# Patient Record
Sex: FEMALE | Race: WHITE | NOT HISPANIC OR LATINO
[De-identification: names, ages, dates, MRNs, and addresses within clinical notes are randomized per-mention and may not be internally consistent; named-entity substitution may affect disease eponyms.]

---

## 2017-02-10 ENCOUNTER — APPOINTMENT (OUTPATIENT)
Dept: NEUROLOGY | Facility: CLINIC | Age: 82
End: 2017-02-10

## 2017-03-03 ENCOUNTER — EMERGENCY (EMERGENCY)
Facility: HOSPITAL | Age: 82
LOS: 1 days | End: 2017-03-03
Admitting: EMERGENCY MEDICINE
Payer: MEDICARE

## 2017-03-03 PROCEDURE — 70450 CT HEAD/BRAIN W/O DYE: CPT

## 2017-03-03 PROCEDURE — 72125 CT NECK SPINE W/O DYE: CPT

## 2017-03-03 PROCEDURE — 70450 CT HEAD/BRAIN W/O DYE: CPT | Mod: 26

## 2017-03-03 PROCEDURE — 72125 CT NECK SPINE W/O DYE: CPT | Mod: 26

## 2017-03-03 PROCEDURE — 99284 EMERGENCY DEPT VISIT MOD MDM: CPT | Mod: 25

## 2017-09-02 ENCOUNTER — EMERGENCY (EMERGENCY)
Facility: HOSPITAL | Age: 82
LOS: 1 days | Discharge: HOME | End: 2017-09-02
Attending: EMERGENCY MEDICINE | Admitting: EMERGENCY MEDICINE
Payer: MEDICARE

## 2017-09-02 VITALS
SYSTOLIC BLOOD PRESSURE: 124 MMHG | RESPIRATION RATE: 18 BRPM | OXYGEN SATURATION: 95 % | HEART RATE: 64 BPM | HEIGHT: 64 IN | TEMPERATURE: 98 F | WEIGHT: 119.93 LBS | DIASTOLIC BLOOD PRESSURE: 72 MMHG

## 2017-09-02 VITALS
HEART RATE: 76 BPM | TEMPERATURE: 98 F | SYSTOLIC BLOOD PRESSURE: 152 MMHG | RESPIRATION RATE: 16 BRPM | OXYGEN SATURATION: 95 % | DIASTOLIC BLOOD PRESSURE: 64 MMHG

## 2017-09-02 PROCEDURE — 70450 CT HEAD/BRAIN W/O DYE: CPT | Mod: 26

## 2017-09-02 PROCEDURE — 70450 CT HEAD/BRAIN W/O DYE: CPT

## 2017-09-02 PROCEDURE — 99284 EMERGENCY DEPT VISIT MOD MDM: CPT

## 2017-09-02 PROCEDURE — 99284 EMERGENCY DEPT VISIT MOD MDM: CPT | Mod: 25

## 2017-09-02 RX ORDER — LORATADINE 10 MG/1
1 TABLET ORAL
Qty: 0 | Refills: 0 | COMMUNITY

## 2017-09-02 NOTE — ED PROVIDER NOTE - MEDICAL DECISION MAKING DETAILS
89 yo female from [facility] sent for eval of head injury after a fall. Pt denies pain or symptoms. Feels well. PE neg. Plan - CT brain, dc back to [place].

## 2017-09-02 NOTE — ED ADULT NURSE NOTE - PSH
History of appendectomy    History of cataract surgery    History of ovarian cystectomy    History of tonsillectomy    Hx of lumpectomy

## 2017-09-02 NOTE — ED PROVIDER NOTE - OBJECTIVE STATEMENT
89 y/o F pt with history of Parkinson's, TIA (2012), hypothyroidism, MVP, HTN presents to the ED from [facility] [facility] c/o scalp pain s/p backing up and falling backwards. Pt is on aspirin. No LOC. Denies dizziness, visual changes. No further complaints at this time.

## 2017-09-02 NOTE — ED PROVIDER NOTE - PMH
Accelerated hypertension    Gastric reflux    H/O TIA (transient ischemic attack) and stroke    History of mitral valve prolapse    Hypothyroid    Hypothyroidism, acquired    Parkinson's disease

## 2017-09-02 NOTE — ED PROVIDER NOTE - CONSTITUTIONAL, MLM
normal... Well appearing, mildly tremulous, well nourished, awake, alert, oriented to person, place, time/situation and in no apparent distress.

## 2017-09-02 NOTE — ED ADULT NURSE REASSESSMENT NOTE - NS ED NURSE REASSESS COMMENT FT1
pt to be d'c/d  pt discharged stable and ambulatory in nad at present d/c instruction reinforced and pt verbalized understanding vital signs as charted d/c instructions also reinforced with ems pt denies pains offers no c/o at present

## 2017-09-02 NOTE — ED ADULT NURSE NOTE - CHPI ED SYMPTOMS NEG
no abrasion/no vomiting/no deformity/no weakness/no loss of consciousness/no bleeding/no fever/no numbness/no tingling/no confusion

## 2017-12-03 ENCOUNTER — EMERGENCY (EMERGENCY)
Facility: HOSPITAL | Age: 82
LOS: 1 days | Discharge: NURSING, REHAB OR LONG-TERM CARE | End: 2017-12-03
Payer: MEDICARE

## 2017-12-03 VITALS
HEART RATE: 79 BPM | DIASTOLIC BLOOD PRESSURE: 63 MMHG | TEMPERATURE: 98 F | RESPIRATION RATE: 16 BRPM | OXYGEN SATURATION: 100 % | SYSTOLIC BLOOD PRESSURE: 138 MMHG | WEIGHT: 110.01 LBS | HEIGHT: 58 IN

## 2017-12-03 VITALS
SYSTOLIC BLOOD PRESSURE: 140 MMHG | OXYGEN SATURATION: 98 % | DIASTOLIC BLOOD PRESSURE: 70 MMHG | TEMPERATURE: 98 F | RESPIRATION RATE: 16 BRPM | HEART RATE: 70 BPM

## 2017-12-03 PROCEDURE — 99283 EMERGENCY DEPT VISIT LOW MDM: CPT

## 2017-12-03 RX ORDER — SENNA PLUS 8.6 MG/1
1 TABLET ORAL
Qty: 0 | Refills: 0 | COMMUNITY

## 2017-12-03 RX ORDER — ZINC OXIDE 200 MG/G
0 OINTMENT TOPICAL
Qty: 0 | Refills: 0 | COMMUNITY

## 2017-12-03 RX ORDER — DOCUSATE SODIUM 100 MG
0 CAPSULE ORAL
Qty: 0 | Refills: 0 | COMMUNITY

## 2017-12-03 RX ORDER — MULTIVIT-MIN/FERROUS GLUCONATE 9 MG/15 ML
1 LIQUID (ML) ORAL
Qty: 0 | Refills: 0 | COMMUNITY

## 2017-12-03 RX ORDER — FLUTICASONE PROPIONATE 50 MCG
1 SPRAY, SUSPENSION NASAL
Qty: 0 | Refills: 0 | COMMUNITY

## 2017-12-03 RX ORDER — POLYETHYLENE GLYCOL 3350 17 G/17G
0 POWDER, FOR SOLUTION ORAL
Qty: 0 | Refills: 0 | COMMUNITY

## 2017-12-03 RX ORDER — ERGOCALCIFEROL 1.25 MG/1
1 CAPSULE ORAL
Qty: 0 | Refills: 0 | COMMUNITY

## 2017-12-03 NOTE — ED PROVIDER NOTE - OBJECTIVE STATEMENT
89 y/o F presents to the ED BIB EMS for pain in rectum for 1 week. States that she has not had a BM in one week. Pt has parkinson's and that contributes to her frequent fecal impactions. Denies fever, N/V or any other complaints.

## 2017-12-03 NOTE — ED PROVIDER NOTE - CONSTITUTIONAL, MLM
normal... elderly, pleasant, Well appearing, well nourished, awake, alert, oriented to person, place, time/situation and in no apparent distress.

## 2017-12-03 NOTE — ED ADULT NURSE NOTE - OBJECTIVE STATEMENT
Patient BIBEMS for rectal pain and constipation. As per patient she has not passed a bowel movement in "a few days". Abdomen is distended, normactive bowel sounds present. No tenderness is noted.

## 2017-12-03 NOTE — ED PROVIDER NOTE - MEDICAL DECISION MAKING DETAILS
Removed 15 lbs of feces manually. pt got significant relief after fecal disimpaction and fleet enema will be used to complete the disimpaction.

## 2018-02-21 ENCOUNTER — EMERGENCY (EMERGENCY)
Facility: HOSPITAL | Age: 83
LOS: 1 days | Discharge: NURSING, REHAB OR LONG-TERM CARE | End: 2018-02-21
Attending: EMERGENCY MEDICINE | Admitting: EMERGENCY MEDICINE
Payer: MEDICARE

## 2018-02-21 VITALS
TEMPERATURE: 99 F | SYSTOLIC BLOOD PRESSURE: 176 MMHG | HEART RATE: 73 BPM | RESPIRATION RATE: 16 BRPM | DIASTOLIC BLOOD PRESSURE: 76 MMHG

## 2018-02-21 VITALS
HEIGHT: 60 IN | DIASTOLIC BLOOD PRESSURE: 89 MMHG | SYSTOLIC BLOOD PRESSURE: 193 MMHG | WEIGHT: 119.93 LBS | RESPIRATION RATE: 18 BRPM | OXYGEN SATURATION: 96 % | HEART RATE: 72 BPM | TEMPERATURE: 98 F

## 2018-02-21 LAB
ALBUMIN SERPL ELPH-MCNC: 2.6 G/DL — LOW (ref 3.3–5)
ALP SERPL-CCNC: 146 U/L — HIGH (ref 30–120)
ALT FLD-CCNC: <10 U/L DA — LOW (ref 10–60)
ANION GAP SERPL CALC-SCNC: 5 MMOL/L — SIGNIFICANT CHANGE UP (ref 5–17)
APPEARANCE UR: ABNORMAL
APTT BLD: 32.8 SEC — SIGNIFICANT CHANGE UP (ref 27.5–37.4)
AST SERPL-CCNC: 40 U/L — SIGNIFICANT CHANGE UP (ref 10–40)
BACTERIA # UR AUTO: ABNORMAL
BASOPHILS # BLD AUTO: 0.1 K/UL — SIGNIFICANT CHANGE UP (ref 0–0.2)
BASOPHILS NFR BLD AUTO: 1.1 % — SIGNIFICANT CHANGE UP (ref 0–2)
BILIRUB SERPL-MCNC: 0.5 MG/DL — SIGNIFICANT CHANGE UP (ref 0.2–1.2)
BILIRUB UR-MCNC: NEGATIVE — SIGNIFICANT CHANGE UP
BUN SERPL-MCNC: 20 MG/DL — SIGNIFICANT CHANGE UP (ref 7–23)
CALCIUM SERPL-MCNC: 8.3 MG/DL — LOW (ref 8.4–10.5)
CHLORIDE SERPL-SCNC: 104 MMOL/L — SIGNIFICANT CHANGE UP (ref 96–108)
CO2 SERPL-SCNC: 30 MMOL/L — SIGNIFICANT CHANGE UP (ref 22–31)
COLOR SPEC: YELLOW — SIGNIFICANT CHANGE UP
CREAT SERPL-MCNC: 0.75 MG/DL — SIGNIFICANT CHANGE UP (ref 0.5–1.3)
DIFF PNL FLD: ABNORMAL
EOSINOPHIL # BLD AUTO: 0.4 K/UL — SIGNIFICANT CHANGE UP (ref 0–0.5)
EOSINOPHIL NFR BLD AUTO: 4.9 % — SIGNIFICANT CHANGE UP (ref 0–6)
EPI CELLS # UR: SIGNIFICANT CHANGE UP
GLUCOSE SERPL-MCNC: 108 MG/DL — HIGH (ref 70–99)
GLUCOSE UR QL: NEGATIVE MG/DL — SIGNIFICANT CHANGE UP
HCT VFR BLD CALC: 36 % — SIGNIFICANT CHANGE UP (ref 34.5–45)
HGB BLD-MCNC: 11.5 G/DL — SIGNIFICANT CHANGE UP (ref 11.5–15.5)
INR BLD: 0.97 RATIO — SIGNIFICANT CHANGE UP (ref 0.88–1.16)
KETONES UR-MCNC: NEGATIVE — SIGNIFICANT CHANGE UP
LEUKOCYTE ESTERASE UR-ACNC: ABNORMAL
LYMPHOCYTES # BLD AUTO: 2 K/UL — SIGNIFICANT CHANGE UP (ref 1–3.3)
LYMPHOCYTES # BLD AUTO: 28 % — SIGNIFICANT CHANGE UP (ref 13–44)
MAGNESIUM SERPL-MCNC: 2 MG/DL — SIGNIFICANT CHANGE UP (ref 1.6–2.6)
MCHC RBC-ENTMCNC: 30.4 PG — SIGNIFICANT CHANGE UP (ref 27–34)
MCHC RBC-ENTMCNC: 32 GM/DL — SIGNIFICANT CHANGE UP (ref 32–36)
MCV RBC AUTO: 95.2 FL — SIGNIFICANT CHANGE UP (ref 80–100)
MONOCYTES # BLD AUTO: 0.6 K/UL — SIGNIFICANT CHANGE UP (ref 0–0.9)
MONOCYTES NFR BLD AUTO: 8.7 % — SIGNIFICANT CHANGE UP (ref 2–14)
NEUTROPHILS # BLD AUTO: 4.2 K/UL — SIGNIFICANT CHANGE UP (ref 1.8–7.4)
NEUTROPHILS NFR BLD AUTO: 57.4 % — SIGNIFICANT CHANGE UP (ref 43–77)
NITRITE UR-MCNC: NEGATIVE — SIGNIFICANT CHANGE UP
PH UR: 8 — SIGNIFICANT CHANGE UP (ref 5–8)
PLATELET # BLD AUTO: 150 K/UL — SIGNIFICANT CHANGE UP (ref 150–400)
POTASSIUM SERPL-MCNC: 4.5 MMOL/L — SIGNIFICANT CHANGE UP (ref 3.5–5.3)
POTASSIUM SERPL-SCNC: 4.5 MMOL/L — SIGNIFICANT CHANGE UP (ref 3.5–5.3)
PROT SERPL-MCNC: 7.4 G/DL — SIGNIFICANT CHANGE UP (ref 6–8.3)
PROT UR-MCNC: 30 MG/DL
PROTHROM AB SERPL-ACNC: 10.6 SEC — SIGNIFICANT CHANGE UP (ref 9.8–12.7)
RBC # BLD: 3.78 M/UL — LOW (ref 3.8–5.2)
RBC # FLD: 15.1 % — HIGH (ref 10.3–14.5)
RBC CASTS # UR COMP ASSIST: ABNORMAL /HPF (ref 0–4)
SODIUM SERPL-SCNC: 139 MMOL/L — SIGNIFICANT CHANGE UP (ref 135–145)
SP GR SPEC: 1.01 — SIGNIFICANT CHANGE UP (ref 1.01–1.02)
TROPONIN I SERPL-MCNC: 0.01 NG/ML — LOW (ref 0.02–0.06)
UROBILINOGEN FLD QL: NEGATIVE MG/DL — SIGNIFICANT CHANGE UP
WBC # BLD: 7.3 K/UL — SIGNIFICANT CHANGE UP (ref 3.8–10.5)
WBC # FLD AUTO: 7.3 K/UL — SIGNIFICANT CHANGE UP (ref 3.8–10.5)
WBC UR QL: ABNORMAL

## 2018-02-21 PROCEDURE — 93010 ELECTROCARDIOGRAM REPORT: CPT

## 2018-02-21 PROCEDURE — 71045 X-RAY EXAM CHEST 1 VIEW: CPT

## 2018-02-21 PROCEDURE — 72125 CT NECK SPINE W/O DYE: CPT

## 2018-02-21 PROCEDURE — 36415 COLL VENOUS BLD VENIPUNCTURE: CPT

## 2018-02-21 PROCEDURE — 70450 CT HEAD/BRAIN W/O DYE: CPT | Mod: 26

## 2018-02-21 PROCEDURE — 73502 X-RAY EXAM HIP UNI 2-3 VIEWS: CPT | Mod: 26,LT

## 2018-02-21 PROCEDURE — 85610 PROTHROMBIN TIME: CPT

## 2018-02-21 PROCEDURE — 85027 COMPLETE CBC AUTOMATED: CPT

## 2018-02-21 PROCEDURE — 81001 URINALYSIS AUTO W/SCOPE: CPT

## 2018-02-21 PROCEDURE — 70450 CT HEAD/BRAIN W/O DYE: CPT

## 2018-02-21 PROCEDURE — 72170 X-RAY EXAM OF PELVIS: CPT

## 2018-02-21 PROCEDURE — 87186 SC STD MICRODIL/AGAR DIL: CPT

## 2018-02-21 PROCEDURE — 73700 CT LOWER EXTREMITY W/O DYE: CPT

## 2018-02-21 PROCEDURE — 99285 EMERGENCY DEPT VISIT HI MDM: CPT

## 2018-02-21 PROCEDURE — 83735 ASSAY OF MAGNESIUM: CPT

## 2018-02-21 PROCEDURE — 71045 X-RAY EXAM CHEST 1 VIEW: CPT | Mod: 26

## 2018-02-21 PROCEDURE — 96374 THER/PROPH/DIAG INJ IV PUSH: CPT

## 2018-02-21 PROCEDURE — 99284 EMERGENCY DEPT VISIT MOD MDM: CPT | Mod: 25

## 2018-02-21 PROCEDURE — 73552 X-RAY EXAM OF FEMUR 2/>: CPT

## 2018-02-21 PROCEDURE — 73700 CT LOWER EXTREMITY W/O DYE: CPT | Mod: 26,LT

## 2018-02-21 PROCEDURE — 93005 ELECTROCARDIOGRAM TRACING: CPT

## 2018-02-21 PROCEDURE — 85730 THROMBOPLASTIN TIME PARTIAL: CPT

## 2018-02-21 PROCEDURE — 73030 X-RAY EXAM OF SHOULDER: CPT | Mod: 26,LT

## 2018-02-21 PROCEDURE — 96361 HYDRATE IV INFUSION ADD-ON: CPT

## 2018-02-21 PROCEDURE — 84484 ASSAY OF TROPONIN QUANT: CPT

## 2018-02-21 PROCEDURE — 80053 COMPREHEN METABOLIC PANEL: CPT

## 2018-02-21 PROCEDURE — 87086 URINE CULTURE/COLONY COUNT: CPT

## 2018-02-21 PROCEDURE — 73502 X-RAY EXAM HIP UNI 2-3 VIEWS: CPT

## 2018-02-21 PROCEDURE — 73030 X-RAY EXAM OF SHOULDER: CPT

## 2018-02-21 PROCEDURE — 73552 X-RAY EXAM OF FEMUR 2/>: CPT | Mod: 26,LT

## 2018-02-21 PROCEDURE — 72125 CT NECK SPINE W/O DYE: CPT | Mod: 26

## 2018-02-21 RX ORDER — SODIUM CHLORIDE 9 MG/ML
1000 INJECTION INTRAMUSCULAR; INTRAVENOUS; SUBCUTANEOUS ONCE
Qty: 0 | Refills: 0 | Status: COMPLETED | OUTPATIENT
Start: 2018-02-21 | End: 2018-02-21

## 2018-02-21 RX ORDER — CEFOXITIN 1 G/1
1 INJECTION, POWDER, FOR SOLUTION INTRAVENOUS
Qty: 14 | Refills: 0 | OUTPATIENT
Start: 2018-02-21 | End: 2018-02-27

## 2018-02-21 RX ORDER — CEFTRIAXONE 500 MG/1
1 INJECTION, POWDER, FOR SOLUTION INTRAMUSCULAR; INTRAVENOUS ONCE
Qty: 0 | Refills: 0 | Status: COMPLETED | OUTPATIENT
Start: 2018-02-21 | End: 2018-02-21

## 2018-02-21 RX ADMIN — SODIUM CHLORIDE 500 MILLILITER(S): 9 INJECTION INTRAMUSCULAR; INTRAVENOUS; SUBCUTANEOUS at 09:54

## 2018-02-21 RX ADMIN — CEFTRIAXONE 100 GRAM(S): 500 INJECTION, POWDER, FOR SOLUTION INTRAMUSCULAR; INTRAVENOUS at 10:16

## 2018-02-21 NOTE — ED PROVIDER NOTE - CARE PLAN
Principal Discharge DX:	Closed head injury, initial encounter Principal Discharge DX:	Closed head injury, initial encounter  Secondary Diagnosis:	Cystitis

## 2018-02-21 NOTE — ED PROVIDER NOTE - UNABLE TO OBTAIN
Dementia dementia, no ROS on records, will call AL for additional details but staff not available currently.

## 2018-02-21 NOTE — ED PROVIDER NOTE - PROGRESS NOTE DETAILS
Dr. Durham Note: called [facility], spoke to day nurse Apple, unaware of last night details, will try to contact overnight supervisor.  PT stable, will screen for uti, anemia, ICH/ traumatic injury. Dr. Durham Note:  Apple from [facility] [facility] called back, fell while trying to get out bed, occurred at 6:15am, sent for eval.  Reviewed xrays, concerned for possible fx of hip, will get dedicated films. Dr. Durham Note: ct hip negative, ambulated patient...besides coordination issues, pt able to fully bear weight on either leg without pain.  Will dc on antibiotics and f/u outpt.  Notified pt's PCP, Dr. Snow, and pt's son, Michael.

## 2018-02-21 NOTE — ED ADULT NURSE NOTE - OBJECTIVE STATEMENT
Presents to Ed via amb from [address] [facility]. Transfer papers state pt fell this morning. Presents to Ed via amb from [address]. Transfer papers state pt fell this morning. O/E pt is lethargic with confusion. Minimal verbal response. Responses to painful & tactile stimulin with moaning. Pt very stiff. Ecchymosis noted to left forehead & left posterior hip. Old ecchymosis noted to rt lower leg & rt upper arm. Sacral area reddened- stage 1.  Lungs- diminished at bases. Abd soft nontender.

## 2018-02-21 NOTE — ED PROVIDER NOTE - PHYSICAL EXAMINATION
small contusion posterior L shoulder, FROM, nontender.    No c/t/l spine td.  Old contusions lower legs.  pale conj.

## 2018-02-21 NOTE — ED PROVIDER NOTE - OBJECTIVE STATEMENT
Dr. Durham Note: 89yo female from [facility] brought in by EMS for reported fall, hit head.  Onset unclear, details of fall unknown.  Hx limited due to dementia.  Pt has no complaints.

## 2018-02-21 NOTE — ED ADULT NURSE NOTE - NURSING MUSC EXTREMITY LIMITED ROM
left upper extremity/left lower extremity/right upper extremity/stiffness/ Parkinsons/right lower extremity

## 2018-03-15 ENCOUNTER — INPATIENT (INPATIENT)
Facility: HOSPITAL | Age: 83
LOS: 7 days | Discharge: NURSING, REHAB OR LONG-TERM CARE | DRG: 291 | End: 2018-03-23
Attending: INTERNAL MEDICINE | Admitting: INTERNAL MEDICINE
Payer: MEDICARE

## 2018-03-15 VITALS
HEIGHT: 64 IN | OXYGEN SATURATION: 93 % | HEART RATE: 76 BPM | SYSTOLIC BLOOD PRESSURE: 96 MMHG | DIASTOLIC BLOOD PRESSURE: 55 MMHG | RESPIRATION RATE: 20 BRPM | WEIGHT: 119.93 LBS | TEMPERATURE: 99 F

## 2018-03-15 DIAGNOSIS — E03.9 HYPOTHYROIDISM, UNSPECIFIED: ICD-10-CM

## 2018-03-15 DIAGNOSIS — J90 PLEURAL EFFUSION, NOT ELSEWHERE CLASSIFIED: ICD-10-CM

## 2018-03-15 DIAGNOSIS — G20 PARKINSON'S DISEASE: ICD-10-CM

## 2018-03-15 DIAGNOSIS — I10 ESSENTIAL (PRIMARY) HYPERTENSION: ICD-10-CM

## 2018-03-15 DIAGNOSIS — R06.03 ACUTE RESPIRATORY DISTRESS: ICD-10-CM

## 2018-03-15 DIAGNOSIS — J18.9 PNEUMONIA, UNSPECIFIED ORGANISM: ICD-10-CM

## 2018-03-15 LAB
ALBUMIN SERPL ELPH-MCNC: 2.7 G/DL — LOW (ref 3.3–5)
ALP SERPL-CCNC: 209 U/L — HIGH (ref 30–120)
ALT FLD-CCNC: 25 U/L DA — SIGNIFICANT CHANGE UP (ref 10–60)
ANION GAP SERPL CALC-SCNC: 6 MMOL/L — SIGNIFICANT CHANGE UP (ref 5–17)
APPEARANCE UR: ABNORMAL
APTT BLD: 34.3 SEC — SIGNIFICANT CHANGE UP (ref 27.5–37.4)
AST SERPL-CCNC: 23 U/L — SIGNIFICANT CHANGE UP (ref 10–40)
BACTERIA # UR AUTO: ABNORMAL
BASOPHILS # BLD AUTO: 0 K/UL — SIGNIFICANT CHANGE UP (ref 0–0.2)
BASOPHILS NFR BLD AUTO: 0.5 % — SIGNIFICANT CHANGE UP (ref 0–2)
BILIRUB SERPL-MCNC: 0.4 MG/DL — SIGNIFICANT CHANGE UP (ref 0.2–1.2)
BILIRUB UR-MCNC: NEGATIVE — SIGNIFICANT CHANGE UP
BUN SERPL-MCNC: 26 MG/DL — HIGH (ref 7–23)
CALCIUM SERPL-MCNC: 8.1 MG/DL — LOW (ref 8.4–10.5)
CHLORIDE SERPL-SCNC: 101 MMOL/L — SIGNIFICANT CHANGE UP (ref 96–108)
CK MB BLD-MCNC: 2.1 % — SIGNIFICANT CHANGE UP (ref 0–3.5)
CK MB CFR SERPL CALC: 0.7 NG/ML — SIGNIFICANT CHANGE UP (ref 0–3.6)
CK SERPL-CCNC: 33 U/L — SIGNIFICANT CHANGE UP (ref 26–192)
CO2 SERPL-SCNC: 29 MMOL/L — SIGNIFICANT CHANGE UP (ref 22–31)
COLOR SPEC: YELLOW — SIGNIFICANT CHANGE UP
CREAT SERPL-MCNC: 0.85 MG/DL — SIGNIFICANT CHANGE UP (ref 0.5–1.3)
CRP SERPL-MCNC: 4.2 MG/DL — HIGH (ref 0–0.4)
D DIMER BLD IA.RAPID-MCNC: 9946 NG/ML DDU — HIGH
DIFF PNL FLD: ABNORMAL
EOSINOPHIL # BLD AUTO: 0 K/UL — SIGNIFICANT CHANGE UP (ref 0–0.5)
EOSINOPHIL NFR BLD AUTO: 0.4 % — SIGNIFICANT CHANGE UP (ref 0–6)
EPI CELLS # UR: SIGNIFICANT CHANGE UP
GLUCOSE SERPL-MCNC: 127 MG/DL — HIGH (ref 70–99)
GLUCOSE UR QL: NEGATIVE MG/DL — SIGNIFICANT CHANGE UP
HCT VFR BLD CALC: 31.4 % — LOW (ref 34.5–45)
HGB BLD-MCNC: 10.3 G/DL — LOW (ref 11.5–15.5)
HMPV RNA SPEC QL NAA+PROBE: DETECTED
INR BLD: 1.02 RATIO — SIGNIFICANT CHANGE UP (ref 0.88–1.16)
KETONES UR-MCNC: ABNORMAL
LACTATE SERPL-SCNC: 1 MMOL/L — SIGNIFICANT CHANGE UP (ref 0.7–2)
LEUKOCYTE ESTERASE UR-ACNC: ABNORMAL
LYMPHOCYTES # BLD AUTO: 0.9 K/UL — LOW (ref 1–3.3)
LYMPHOCYTES # BLD AUTO: 15.4 % — SIGNIFICANT CHANGE UP (ref 13–44)
MAGNESIUM SERPL-MCNC: 2.4 MG/DL — SIGNIFICANT CHANGE UP (ref 1.6–2.6)
MCHC RBC-ENTMCNC: 32.7 PG — SIGNIFICANT CHANGE UP (ref 27–34)
MCHC RBC-ENTMCNC: 32.9 GM/DL — SIGNIFICANT CHANGE UP (ref 32–36)
MCV RBC AUTO: 99.3 FL — SIGNIFICANT CHANGE UP (ref 80–100)
MONOCYTES # BLD AUTO: 0.6 K/UL — SIGNIFICANT CHANGE UP (ref 0–0.9)
MONOCYTES NFR BLD AUTO: 9.8 % — SIGNIFICANT CHANGE UP (ref 2–14)
NEUTROPHILS # BLD AUTO: 4.5 K/UL — SIGNIFICANT CHANGE UP (ref 1.8–7.4)
NEUTROPHILS NFR BLD AUTO: 73.8 % — SIGNIFICANT CHANGE UP (ref 43–77)
NITRITE UR-MCNC: NEGATIVE — SIGNIFICANT CHANGE UP
NT-PROBNP SERPL-SCNC: 3927 PG/ML — HIGH (ref 0–450)
PH UR: 7 — SIGNIFICANT CHANGE UP (ref 5–8)
PLATELET # BLD AUTO: 135 K/UL — LOW (ref 150–400)
POTASSIUM SERPL-MCNC: 3.9 MMOL/L — SIGNIFICANT CHANGE UP (ref 3.5–5.3)
POTASSIUM SERPL-SCNC: 3.9 MMOL/L — SIGNIFICANT CHANGE UP (ref 3.5–5.3)
PROT SERPL-MCNC: 7.1 G/DL — SIGNIFICANT CHANGE UP (ref 6–8.3)
PROT UR-MCNC: 30 MG/DL
PROTHROM AB SERPL-ACNC: 11.1 SEC — SIGNIFICANT CHANGE UP (ref 9.8–12.7)
RAPID RVP RESULT: DETECTED
RBC # BLD: 3.16 M/UL — LOW (ref 3.8–5.2)
RBC # FLD: 15.8 % — HIGH (ref 10.3–14.5)
RBC CASTS # UR COMP ASSIST: ABNORMAL /HPF (ref 0–4)
SODIUM SERPL-SCNC: 136 MMOL/L — SIGNIFICANT CHANGE UP (ref 135–145)
SP GR SPEC: 1.01 — SIGNIFICANT CHANGE UP (ref 1.01–1.02)
TROPONIN I SERPL-MCNC: 0.01 NG/ML — LOW (ref 0.02–0.06)
UROBILINOGEN FLD QL: 1 MG/DL
WBC # BLD: 6.1 K/UL — SIGNIFICANT CHANGE UP (ref 3.8–10.5)
WBC # FLD AUTO: 6.1 K/UL — SIGNIFICANT CHANGE UP (ref 3.8–10.5)
WBC UR QL: >50

## 2018-03-15 PROCEDURE — 71045 X-RAY EXAM CHEST 1 VIEW: CPT | Mod: 26

## 2018-03-15 PROCEDURE — 99285 EMERGENCY DEPT VISIT HI MDM: CPT

## 2018-03-15 PROCEDURE — 93306 TTE W/DOPPLER COMPLETE: CPT | Mod: 26

## 2018-03-15 PROCEDURE — 71275 CT ANGIOGRAPHY CHEST: CPT | Mod: 26

## 2018-03-15 PROCEDURE — 70450 CT HEAD/BRAIN W/O DYE: CPT | Mod: 26

## 2018-03-15 PROCEDURE — 93010 ELECTROCARDIOGRAM REPORT: CPT

## 2018-03-15 RX ORDER — POLYETHYLENE GLYCOL 3350 17 G/17G
17 POWDER, FOR SOLUTION ORAL DAILY
Qty: 0 | Refills: 0 | Status: DISCONTINUED | OUTPATIENT
Start: 2018-03-15 | End: 2018-03-23

## 2018-03-15 RX ORDER — MORPHINE SULFATE 50 MG/1
0.5 CAPSULE, EXTENDED RELEASE ORAL ONCE
Qty: 0 | Refills: 0 | Status: DISCONTINUED | OUTPATIENT
Start: 2018-03-15 | End: 2018-03-15

## 2018-03-15 RX ORDER — CARBIDOPA AND LEVODOPA 25; 100 MG/1; MG/1
2 TABLET ORAL
Qty: 0 | Refills: 0 | Status: DISCONTINUED | OUTPATIENT
Start: 2018-03-15 | End: 2018-03-23

## 2018-03-15 RX ORDER — DOCUSATE SODIUM 100 MG
100 CAPSULE ORAL
Qty: 0 | Refills: 0 | Status: DISCONTINUED | OUTPATIENT
Start: 2018-03-15 | End: 2018-03-23

## 2018-03-15 RX ORDER — CARBIDOPA AND LEVODOPA 25; 100 MG/1; MG/1
1 TABLET ORAL
Qty: 0 | Refills: 0 | Status: DISCONTINUED | OUTPATIENT
Start: 2018-03-15 | End: 2018-03-23

## 2018-03-15 RX ORDER — SODIUM CHLORIDE 0.65 %
1 AEROSOL, SPRAY (ML) NASAL
Qty: 0 | Refills: 0 | Status: DISCONTINUED | OUTPATIENT
Start: 2018-03-15 | End: 2018-03-23

## 2018-03-15 RX ORDER — CARBIDOPA AND LEVODOPA 25; 100 MG/1; MG/1
0.5 TABLET ORAL
Qty: 0 | Refills: 0 | Status: DISCONTINUED | OUTPATIENT
Start: 2018-03-15 | End: 2018-03-23

## 2018-03-15 RX ORDER — LEVOTHYROXINE SODIUM 125 MCG
100 TABLET ORAL DAILY
Qty: 0 | Refills: 0 | Status: DISCONTINUED | OUTPATIENT
Start: 2018-03-15 | End: 2018-03-23

## 2018-03-15 RX ORDER — ASPIRIN/CALCIUM CARB/MAGNESIUM 324 MG
81 TABLET ORAL DAILY
Qty: 0 | Refills: 0 | Status: DISCONTINUED | OUTPATIENT
Start: 2018-03-15 | End: 2018-03-23

## 2018-03-15 RX ORDER — ESCITALOPRAM OXALATE 10 MG/1
10 TABLET, FILM COATED ORAL DAILY
Qty: 0 | Refills: 0 | Status: DISCONTINUED | OUTPATIENT
Start: 2018-03-15 | End: 2018-03-23

## 2018-03-15 RX ORDER — FUROSEMIDE 40 MG
40 TABLET ORAL DAILY
Qty: 0 | Refills: 0 | Status: DISCONTINUED | OUTPATIENT
Start: 2018-03-15 | End: 2018-03-18

## 2018-03-15 RX ORDER — SODIUM CHLORIDE 9 MG/ML
1000 INJECTION INTRAMUSCULAR; INTRAVENOUS; SUBCUTANEOUS
Qty: 0 | Refills: 0 | Status: COMPLETED | OUTPATIENT
Start: 2018-03-15 | End: 2018-03-15

## 2018-03-15 RX ORDER — SODIUM CHLORIDE 9 MG/ML
3 INJECTION INTRAMUSCULAR; INTRAVENOUS; SUBCUTANEOUS ONCE
Qty: 0 | Refills: 0 | Status: COMPLETED | OUTPATIENT
Start: 2018-03-15 | End: 2018-03-15

## 2018-03-15 RX ORDER — ENOXAPARIN SODIUM 100 MG/ML
30 INJECTION SUBCUTANEOUS DAILY
Qty: 0 | Refills: 0 | Status: DISCONTINUED | OUTPATIENT
Start: 2018-03-15 | End: 2018-03-23

## 2018-03-15 RX ORDER — ALBUTEROL 90 UG/1
2.5 AEROSOL, METERED ORAL EVERY 6 HOURS
Qty: 0 | Refills: 0 | Status: DISCONTINUED | OUTPATIENT
Start: 2018-03-15 | End: 2018-03-20

## 2018-03-15 RX ORDER — CARBIDOPA AND LEVODOPA 25; 100 MG/1; MG/1
2 TABLET ORAL
Qty: 0 | Refills: 0 | Status: DISCONTINUED | OUTPATIENT
Start: 2018-03-15 | End: 2018-03-15

## 2018-03-15 RX ORDER — BUDESONIDE, MICRONIZED 100 %
0.5 POWDER (GRAM) MISCELLANEOUS
Qty: 0 | Refills: 0 | Status: DISCONTINUED | OUTPATIENT
Start: 2018-03-15 | End: 2018-03-20

## 2018-03-15 RX ORDER — FUROSEMIDE 40 MG
40 TABLET ORAL ONCE
Qty: 0 | Refills: 0 | Status: COMPLETED | OUTPATIENT
Start: 2018-03-15 | End: 2018-03-15

## 2018-03-15 RX ADMIN — Medication 40 MILLIGRAM(S): at 17:09

## 2018-03-15 RX ADMIN — Medication 0.5 MILLIGRAM(S): at 19:17

## 2018-03-15 RX ADMIN — SODIUM CHLORIDE 3 MILLILITER(S): 9 INJECTION INTRAMUSCULAR; INTRAVENOUS; SUBCUTANEOUS at 13:12

## 2018-03-15 RX ADMIN — SODIUM CHLORIDE 1000 MILLILITER(S): 9 INJECTION INTRAMUSCULAR; INTRAVENOUS; SUBCUTANEOUS at 16:21

## 2018-03-15 RX ADMIN — MORPHINE SULFATE 0.5 MILLIGRAM(S): 50 CAPSULE, EXTENDED RELEASE ORAL at 19:20

## 2018-03-15 RX ADMIN — MORPHINE SULFATE 0.5 MILLIGRAM(S): 50 CAPSULE, EXTENDED RELEASE ORAL at 17:09

## 2018-03-15 RX ADMIN — ALBUTEROL 2.5 MILLIGRAM(S): 90 AEROSOL, METERED ORAL at 19:17

## 2018-03-15 RX ADMIN — SODIUM CHLORIDE 1000 MILLILITER(S): 9 INJECTION INTRAMUSCULAR; INTRAVENOUS; SUBCUTANEOUS at 14:43

## 2018-03-15 RX ADMIN — Medication 20 MILLIGRAM(S): at 23:05

## 2018-03-15 NOTE — CONSULT NOTE ADULT - ASSESSMENT
89 year old female, history of Hypertension, Parkinsons, comes in with respiratory distress.  O2 sat at [address] 75% on rrom air but in ER 95% on room air.  CXR does not appear to be much changed.  BNP rater "low".  EKG = no acute changes, Troponin low.  Quick flu tests positive, all suggest pulmonary cause of respiratory distress.  Await Echo

## 2018-03-15 NOTE — ED PROVIDER NOTE - MEDICAL DECISION MAKING DETAILS
89 yo F from [address] [facility] with hypoxia. PE reveals probable pneumonia. Plan - CXR, labs, probable admission.

## 2018-03-15 NOTE — ED ADULT NURSE REASSESSMENT NOTE - NS ED NURSE REASSESS COMMENT FT1
patient's oxygen is lower to 68% and switched to Non rebreather mask, MD Shaikh aware and Lasix 40mg ivp and Morphine .5mg ivp given. will continue to monitor.

## 2018-03-15 NOTE — CONSULT NOTE ADULT - PROBLEM SELECTOR RECOMMENDATION 9
check RVP - isolation precs  o2 support, keep sat > 88 pct  oral and skin care  chest pt and suction prn, asp prec, HOB elev  NEBS and Solumedrol and Nasal Saline and Pulmicort NEBS  LASIX daily, TTE, Cardio eval, I and O  CTA chest done, report pending, pleural eff, noted, will arrange for US guided thoracentesis with IR tomorrow  DVT p  oral hygiene  skin care  nutrition  assist with ADL  pall care eval for MOLST discussion and goals of care discussion  prognosis guarded, advanced age and multiple comorbidities,   will follow
Probably Viral.  Follow Pulmonary

## 2018-03-15 NOTE — H&P ADULT - ATTENDING COMMENTS
90 minutes spent on this visit, 50% visit time spent in care co-ordination with other attendings and counselling patient  I have discussed care plan with patient and HCP,expressed understanding of problems treatment and their effect and side effects, alternatives in detail,I have asked if they have any questions and concerns and appropriately addressed them to best of my ability  Reviewed all diagonostic tests, lab results and drug drug interactions, and medications

## 2018-03-15 NOTE — ED PROVIDER NOTE - OBJECTIVE STATEMENT
89 y/o F w/ PMHx parkinson's disease, TIA and CVA, HTN, mitral valve prolapse, hypothyroidism, gastric reflux presents to the ED BIBA from [address] for evaluation of Low O2 Sat. As per pt's paperwork, pt had a Pulse ox of 75% on room air. In ED pt's has a Pulse ox value of 93% on room air. Pt states she has a cough. Pt denies CP, SOB or any other complaints at this time.    PMD: Dr. Katie Snow

## 2018-03-15 NOTE — H&P ADULT - HISTORY OF PRESENT ILLNESS
89 yr ol resident of [facility] assisted living facility, with PMH of Accelerated hypertension  Gastric reflux H/O TIA (transient ischemic attack) and stroke  History of mitral valve prolapse  Hypothyroid  Parkinson's disease  was having SOB, and was found to be hypoxic in facility and sent to hospital, In Ed pt is sob, oxygenating well, but markedly congested , altered mental state and had elevated D Dimer had CT angio has pleural effusion left more than rt.  . Unremarkable noncontrast CT scan of the brain.     Likely having CHf acute TTE ordered, cardio consult called, had b/l pleural effusion, pulmonary consult called, mild increase in LFT.  [facility] has many pts with hMPV infection , will try to out viral illness,  pt denies any bowel or urinary complaint, no fever

## 2018-03-15 NOTE — ED ADULT NURSE NOTE - OBJECTIVE STATEMENT
patient is from [facility], c/o low oxygen saturation, patient is aaox3, no c/o pain at this time, noted wet cough, skin is warm to touch and intact. IV accessed and tolerating. will continue to monitor.

## 2018-03-15 NOTE — PROGRESS NOTE ADULT - SUBJECTIVE AND OBJECTIVE BOX
Patient is a 89y old  Female who presents with a chief complaint of sob, Hypoxia (15 Mar 2018 15:43)      BRIEF HOSPITAL COURSE:     89F, HTN, GERD, reflux, TIA, dementia, from [address]. Arrived with resp. distress required BiPAP, found to have large effusions, awaiting IR drainage. Also found to be (+) hMPV    Events last 24 hours: ***    PAST MEDICAL & SURGICAL HISTORY:  History of mitral valve prolapse  Gastric reflux  H/O TIA (transient ischemic attack) and stroke  Hypothyroid  Hypothyroidism, acquired  Parkinson's disease  Accelerated hypertension  History of tonsillectomy  History of cataract surgery  Hx of lumpectomy  History of appendectomy  History of ovarian cystectomy      Review of Systems:  N/A, dementia    Medications:    enalapril 5 milliGRAM(s) Oral daily  furosemide   Injectable 40 milliGRAM(s) IV Push daily    ALBUTerol    0.083% 2.5 milliGRAM(s) Nebulizer every 6 hours  buDESOnide   0.5 milliGRAM(s) Respule 0.5 milliGRAM(s) Inhalation two times a day    carbidopa/levodopa  25/100 2 Tablet(s) Oral <User Schedule>  carbidopa/levodopa CR 50/200 1 Tablet(s) Oral <User Schedule>  carbidopa/levodopa CR 50/200 0.5 Tablet(s) Oral <User Schedule>  escitalopram 10 milliGRAM(s) Oral daily      aspirin enteric coated 81 milliGRAM(s) Oral daily  enoxaparin Injectable 30 milliGRAM(s) SubCutaneous daily    docusate sodium 100 milliGRAM(s) Oral two times a day  polyethylene glycol 3350 17 Gram(s) Oral daily PRN      levothyroxine 100 MICROGram(s) Oral daily  methylPREDNISolone sodium succinate Injectable 20 milliGRAM(s) IV Push every 8 hours        sodium chloride 0.65% Nasal 1 Spray(s) Both Nostrils two times a day            ICU Vital Signs Last 24 Hrs  T(C): 36.5 (16 Mar 2018 04:07), Max: 37.4 (15 Mar 2018 12:38)  T(F): 97.7 (16 Mar 2018 04:07), Max: 99.3 (15 Mar 2018 12:38)  HR: 66 (16 Mar 2018 06:00) (63 - 99)  BP: 158/64 (16 Mar 2018 06:00) (96/55 - 168/69)  BP(mean): 91 (16 Mar 2018 06:00) (68 - 91)  ABP: --  ABP(mean): --  RR: 21 (16 Mar 2018 06:00) (15 - 21)  SpO2: 98% (16 Mar 2018 06:00) (93% - 100%)          I&O's Detail        LABS:                        10.3   6.1   )-----------( 135      ( 15 Mar 2018 13:19 )             31.4     15    136  |  101  |  26<H>  ----------------------------<  127<H>  3.9   |  29  |  0.85    Ca    8.1<L>      15 Mar 2018 13:33  Mg     2.4     03-15    TPro  7.1  /  Alb  2.7<L>  /  TBili  0.4  /  DBili  x   /  AST  23  /  ALT  25  /  AlkPhos  209<H>  15      CARDIAC MARKERS ( 15 Mar 2018 13:33 )  .008 ng/mL / x     / 33 U/L / x     / 0.7 ng/mL      CAPILLARY BLOOD GLUCOSE        PT/INR - ( 15 Mar 2018 13:33 )   PT: 11.1 sec;   INR: 1.02 ratio         PTT - ( 15 Mar 2018 13:33 )  PTT:34.3 sec  Urinalysis Basic - ( 15 Mar 2018 15:59 )    Color: Yellow / Appearance: Turbid / S.010 / pH: x  Gluc: x / Ketone: Trace  / Bili: Negative / Urobili: 1 mg/dL   Blood: x / Protein: 30 mg/dL / Nitrite: Negative   Leuk Esterase: Moderate / RBC: 11-25 /HPF / WBC >50   Sq Epi: x / Non Sq Epi: Few / Bacteria: Many      CULTURES:  Rapid RVP Result: Detected (03-15-18 @ 15:01)      Physical Examination:    General: No acute distress.  Alert, oriented, interactive, nonfocal    HEENT: Pupils equal, reactive to light.  Symmetric.    PULM: diminished at bases bilaterally, no significant sputum production    CVS: Regular rate and rhythm, no murmurs, rubs, or gallops    ABD: Soft, nondistended, nontender, normoactive bowel sounds, no masses    EXT: No edema, nontender    SKIN: Warm and well perfused, no rashes noted.

## 2018-03-15 NOTE — H&P ADULT - ASSESSMENT
89 yr ol resident of [facility] assisted living facility, with PMH of Accelerated hypertension  Gastric reflux H/O TIA (transient ischemic attack) and stroke  History of mitral valve prolapse  Hypothyroid  Parkinson's disease  was having SOB, and was found to be hypoxic in facility and sent to hospital, In Ed pt is sob, oxygenating well, but markedly congested , altered mental state and had elevated D Dimer had CT angio has pleural effusion left more than rt.  . Unremarkable noncontrast CT scan of the brain.     Likely having CHf acute TTE ordered, cardio consult called, had b/l pleural effusion, pulmonary consult called, mild increase in LFT.  [facility] has many pts with hMPV infection , will try to out viral illness,  pt denies any bowel or urinary complaint, no fever     and was in ed for head trauma,and was treated with abx for uti recently  admitted with acute respiratory distress with b/l pleural effusion with possible chf with H/o HTN with MVP, PE ruled out

## 2018-03-16 DIAGNOSIS — I35.0 NONRHEUMATIC AORTIC (VALVE) STENOSIS: ICD-10-CM

## 2018-03-16 DIAGNOSIS — R45.1 RESTLESSNESS AND AGITATION: ICD-10-CM

## 2018-03-16 DIAGNOSIS — J12.3 HUMAN METAPNEUMOVIRUS PNEUMONIA: ICD-10-CM

## 2018-03-16 LAB
ALBUMIN SERPL ELPH-MCNC: 2.3 G/DL — LOW (ref 3.3–5)
ALP SERPL-CCNC: 182 U/L — HIGH (ref 30–120)
ALT FLD-CCNC: <10 U/L DA — LOW (ref 10–60)
ANION GAP SERPL CALC-SCNC: 6 MMOL/L — SIGNIFICANT CHANGE UP (ref 5–17)
AST SERPL-CCNC: 20 U/L — SIGNIFICANT CHANGE UP (ref 10–40)
BILIRUB SERPL-MCNC: 0.4 MG/DL — SIGNIFICANT CHANGE UP (ref 0.2–1.2)
BUN SERPL-MCNC: 20 MG/DL — SIGNIFICANT CHANGE UP (ref 7–23)
CALCIUM SERPL-MCNC: 8.1 MG/DL — LOW (ref 8.4–10.5)
CHLORIDE SERPL-SCNC: 104 MMOL/L — SIGNIFICANT CHANGE UP (ref 96–108)
CO2 SERPL-SCNC: 32 MMOL/L — HIGH (ref 22–31)
CREAT SERPL-MCNC: 0.75 MG/DL — SIGNIFICANT CHANGE UP (ref 0.5–1.3)
GLUCOSE SERPL-MCNC: 82 MG/DL — SIGNIFICANT CHANGE UP (ref 70–99)
HCT VFR BLD CALC: 29.6 % — LOW (ref 34.5–45)
HGB BLD-MCNC: 9.5 G/DL — LOW (ref 11.5–15.5)
LDH SERPL L TO P-CCNC: 200 U/L — SIGNIFICANT CHANGE UP (ref 50–242)
MAGNESIUM SERPL-MCNC: 2.2 MG/DL — SIGNIFICANT CHANGE UP (ref 1.6–2.6)
MCHC RBC-ENTMCNC: 31.9 PG — SIGNIFICANT CHANGE UP (ref 27–34)
MCHC RBC-ENTMCNC: 32.1 GM/DL — SIGNIFICANT CHANGE UP (ref 32–36)
MCV RBC AUTO: 99.6 FL — SIGNIFICANT CHANGE UP (ref 80–100)
PLATELET # BLD AUTO: 119 K/UL — LOW (ref 150–400)
POTASSIUM SERPL-MCNC: 3.4 MMOL/L — LOW (ref 3.5–5.3)
POTASSIUM SERPL-SCNC: 3.4 MMOL/L — LOW (ref 3.5–5.3)
PROT SERPL-MCNC: 6.5 G/DL — SIGNIFICANT CHANGE UP (ref 6–8.3)
RBC # BLD: 2.98 M/UL — LOW (ref 3.8–5.2)
RBC # FLD: 15.9 % — HIGH (ref 10.3–14.5)
SODIUM SERPL-SCNC: 142 MMOL/L — SIGNIFICANT CHANGE UP (ref 135–145)
WBC # BLD: 5.4 K/UL — SIGNIFICANT CHANGE UP (ref 3.8–10.5)
WBC # FLD AUTO: 5.4 K/UL — SIGNIFICANT CHANGE UP (ref 3.8–10.5)

## 2018-03-16 PROCEDURE — 76700 US EXAM ABDOM COMPLETE: CPT | Mod: 26

## 2018-03-16 RX ORDER — LACTOBACILLUS ACIDOPHILUS 100MM CELL
1 CAPSULE ORAL
Qty: 0 | Refills: 0 | Status: DISCONTINUED | OUTPATIENT
Start: 2018-03-16 | End: 2018-03-23

## 2018-03-16 RX ORDER — ERTAPENEM SODIUM 1 G/1
500 INJECTION, POWDER, LYOPHILIZED, FOR SOLUTION INTRAMUSCULAR; INTRAVENOUS EVERY 24 HOURS
Qty: 0 | Refills: 0 | Status: COMPLETED | OUTPATIENT
Start: 2018-03-16 | End: 2018-03-22

## 2018-03-16 RX ORDER — HALOPERIDOL DECANOATE 100 MG/ML
1 INJECTION INTRAMUSCULAR ONCE
Qty: 0 | Refills: 0 | Status: COMPLETED | OUTPATIENT
Start: 2018-03-16 | End: 2018-03-16

## 2018-03-16 RX ORDER — POTASSIUM CHLORIDE 20 MEQ
40 PACKET (EA) ORAL ONCE
Qty: 0 | Refills: 0 | Status: COMPLETED | OUTPATIENT
Start: 2018-03-16 | End: 2018-03-16

## 2018-03-16 RX ADMIN — ALBUTEROL 2.5 MILLIGRAM(S): 90 AEROSOL, METERED ORAL at 12:39

## 2018-03-16 RX ADMIN — Medication 0.5 MILLIGRAM(S): at 07:10

## 2018-03-16 RX ADMIN — Medication 1 SPRAY(S): at 17:41

## 2018-03-16 RX ADMIN — Medication 20 MILLIGRAM(S): at 14:03

## 2018-03-16 RX ADMIN — Medication 40 MILLIGRAM(S): at 06:47

## 2018-03-16 RX ADMIN — Medication 81 MILLIGRAM(S): at 11:35

## 2018-03-16 RX ADMIN — Medication 0.5 MILLIGRAM(S): at 20:23

## 2018-03-16 RX ADMIN — ALBUTEROL 2.5 MILLIGRAM(S): 90 AEROSOL, METERED ORAL at 20:23

## 2018-03-16 RX ADMIN — Medication 100 MICROGRAM(S): at 06:48

## 2018-03-16 RX ADMIN — Medication 1 TABLET(S): at 11:35

## 2018-03-16 RX ADMIN — Medication 5 MILLIGRAM(S): at 06:47

## 2018-03-16 RX ADMIN — CARBIDOPA AND LEVODOPA 2 TABLET(S): 25; 100 TABLET ORAL at 17:12

## 2018-03-16 RX ADMIN — HALOPERIDOL DECANOATE 1 MILLIGRAM(S): 100 INJECTION INTRAMUSCULAR at 21:45

## 2018-03-16 RX ADMIN — ESCITALOPRAM OXALATE 10 MILLIGRAM(S): 10 TABLET, FILM COATED ORAL at 11:34

## 2018-03-16 RX ADMIN — ALBUTEROL 2.5 MILLIGRAM(S): 90 AEROSOL, METERED ORAL at 07:10

## 2018-03-16 RX ADMIN — Medication 100 MILLIGRAM(S): at 06:46

## 2018-03-16 RX ADMIN — Medication 40 MILLIEQUIVALENT(S): at 11:35

## 2018-03-16 RX ADMIN — ENOXAPARIN SODIUM 30 MILLIGRAM(S): 100 INJECTION SUBCUTANEOUS at 11:35

## 2018-03-16 RX ADMIN — Medication 1 TABLET(S): at 17:12

## 2018-03-16 RX ADMIN — Medication 20 MILLIGRAM(S): at 06:48

## 2018-03-16 RX ADMIN — Medication 20 MILLIGRAM(S): at 21:52

## 2018-03-16 RX ADMIN — CARBIDOPA AND LEVODOPA 0.5 TABLET(S): 25; 100 TABLET ORAL at 06:51

## 2018-03-16 RX ADMIN — Medication 100 MILLIGRAM(S): at 17:41

## 2018-03-16 RX ADMIN — ALBUTEROL 2.5 MILLIGRAM(S): 90 AEROSOL, METERED ORAL at 01:57

## 2018-03-16 RX ADMIN — Medication 1 SPRAY(S): at 06:48

## 2018-03-16 RX ADMIN — CARBIDOPA AND LEVODOPA 2 TABLET(S): 25; 100 TABLET ORAL at 14:03

## 2018-03-16 RX ADMIN — CARBIDOPA AND LEVODOPA 2 TABLET(S): 25; 100 TABLET ORAL at 09:28

## 2018-03-16 RX ADMIN — ERTAPENEM SODIUM 100 MILLIGRAM(S): 1 INJECTION, POWDER, LYOPHILIZED, FOR SOLUTION INTRAMUSCULAR; INTRAVENOUS at 18:46

## 2018-03-16 NOTE — PROGRESS NOTE ADULT - ASSESSMENT
89 year old female with viral pneumonia secondary to human meta pneumovirus     Time spent: 30 mins assessing presenting problems of acute illness that poses high probability  end organ damage/dysfunction.  Medical decision making inculding plan of daily care, reviewing data, reviewing radiology, discussing with multidisciplinary team, non inclusive of procedures, discussing goals of care with patient/family

## 2018-03-16 NOTE — PROGRESS NOTE ADULT - SUBJECTIVE AND OBJECTIVE BOX
Patient is a 89y old  Female who presents with a chief complaint of sob, Hypoxia (15 Mar 2018 15:43)      INTERVAL HPI/OVERNIGHT EVENTS:    Home Medications:  acetaminophen-diphenhydramine 500 mg-25 mg oral capsule:  (03 Dec 2017 18:30)  aspirin 81 mg oral delayed release capsule:  orally once a day (03 Dec 2017 18:30)  carbidopa-levodopa 25 mg-100 mg oral tablet: 2  orally 4 times a day (03 Dec 2017 18:30)  carbidopa-levodopa 50 mg-200 mg oral tablet, extended release: 1 tab(s) orally every 6 hours (03 Dec 2017 18:30)  Centrum oral tablet, chewable: 1 tab(s) orally once a day (03 Dec 2017 18:30)  Colace 100 mg oral capsule:  orally 3 times a day (03 Dec 2017 18:00)  docusate potassium 100 mg oral capsule:  (03 Dec 2017 18:30)  enalapril 5 mg oral tablet: 1 tab(s) orally once a day (03 Dec 2017 18:30)  ergocalciferol 50,000 intl units (1.25 mg) oral capsule: 1 cap(s) orally every other week (03 Dec 2017 18:30)  Flonase 50 mcg/inh nasal spray: 1 spray(s) nasal once a day (03 Dec 2017 18:30)  levothyroxine 100 mcg (0.1 mg) oral tablet: 1 tab(s) orally once a day (03 Dec 2017 18:30)  Lexapro 10 mg oral tablet: 1 tab(s) orally once a day (03 Dec 2017 18:30)  loratadine 10 mg oral tablet: 1 tab(s) orally once a day (03 Dec 2017 18:30)  Milk of Magnesia:  (03 Dec 2017 18:30)  MiraLax oral powder for reconstitution:  (03 Dec 2017 18:30)  Ocuvite Lutein oral capsule: 1 cap(s) orally 2 times a day (03 Dec 2017 18:00)  omeprazole 20 mg oral delayed release capsule:  orally 2 times a day (03 Dec 2017 18:00)  polyethylene glycol 3350 oral powder for reconstitution:  orally every 3 days (03 Dec 2017 18:00)  senna 8.6 mg oral tablet: 2  orally once a day (at bedtime) (03 Dec 2017 18:30)  Senna 8.6 mg oral tablet: 1 tab(s) orally once a day (at bedtime) (03 Dec 2017 18:30)  Systane ophthalmic solution: 1 drop(s) to each affected eye 2 times a day (03 Dec 2017 18:00)  Systane ophthalmic solution: 1 drop(s) to each affected eye 2 times a day (03 Dec 2017 18:30)  zinc oxide topical ointment:  (03 Dec 2017 18:30)      MEDICATIONS  (STANDING):  ALBUTerol    0.083% 2.5 milliGRAM(s) Nebulizer every 6 hours  aspirin enteric coated 81 milliGRAM(s) Oral daily  buDESOnide   0.5 milliGRAM(s) Respule 0.5 milliGRAM(s) Inhalation two times a day  carbidopa/levodopa  25/100 2 Tablet(s) Oral <User Schedule>  carbidopa/levodopa CR 50/200 1 Tablet(s) Oral <User Schedule>  carbidopa/levodopa CR 50/200 0.5 Tablet(s) Oral <User Schedule>  docusate sodium 100 milliGRAM(s) Oral two times a day  enalapril 5 milliGRAM(s) Oral daily  enoxaparin Injectable 30 milliGRAM(s) SubCutaneous daily  escitalopram 10 milliGRAM(s) Oral daily  furosemide   Injectable 40 milliGRAM(s) IV Push daily  levothyroxine 100 MICROGram(s) Oral daily  methylPREDNISolone sodium succinate Injectable 20 milliGRAM(s) IV Push every 8 hours  sodium chloride 0.65% Nasal 1 Spray(s) Both Nostrils two times a day    MEDICATIONS  (PRN):  polyethylene glycol 3350 17 Gram(s) Oral daily PRN Constipation      Allergies    beta blockers (Hives)  penicillin (Unknown)    Intolerances        REVIEW OF SYSTEMS:  unable as pt lethargic, denies though pain   Vital Signs Last 24 Hrs  T(C): 37.2 (16 Mar 2018 08:13), Max: 37.4 (15 Mar 2018 12:38)  T(F): 98.9 (16 Mar 2018 08:13), Max: 99.3 (15 Mar 2018 12:38)  HR: 66 (16 Mar 2018 08:20) (63 - 99)  BP: 157/67 (16 Mar 2018 08:20) (96/55 - 168/69)  BP(mean): 94 (16 Mar 2018 08:20) (68 - 94)  RR: 13 (16 Mar 2018 08:20) (13 - 21)  SpO2: 99% (16 Mar 2018 08:20) (93% - 100%)    PHYSICAL EXAM:  GENERAL: NAD, well-groomed, well-developed  HEAD:  Atraumatic, Normocephalic  EYES: EOMI, PERRLA, conjunctiva and sclera clear  ENMT: Moist mucous membranes,   NECK: Supple, No JVD, Normal thyroid  NERVOUS SYSTEM:  Alert & Oriented X1, Motor Strength 3/5 B/L upper and lower extremities; DTRs 2+ intact and symmetric  CHEST/LUNG: BS decreased at bases  HEART: Regular rate and rhythm; No murmurs, rubs, or gallops  ABDOMEN: Soft, Nontender, Nondistended; Bowel sounds present  EXTREMITIES:  2+ Peripheral Pulses, No clubbing, cyanosis, or edema  LYMPH: No lymphadenopathy noted  SKIN: No rashes or lesions    LABS:                        9.5    5.4   )-----------( 119      ( 16 Mar 2018 07:18 )             29.6     03-16    142  |  104  |  20  ----------------------------<  82  3.4<L>   |  32<H>  |  0.75    Ca    8.1<L>      16 Mar 2018 07:18  Mg     2.2     -    TPro  6.5  /  Alb  2.3<L>  /  TBili  0.4  /  DBili  x   /  AST  20  /  ALT  <10<L>  /  AlkPhos  182<H>  -16    PT/INR - ( 15 Mar 2018 13:33 )   PT: 11.1 sec;   INR: 1.02 ratio         PTT - ( 15 Mar 2018 13:33 )  PTT:34.3 sec  Urinalysis Basic - ( 15 Mar 2018 15:59 )    Color: Yellow / Appearance: Turbid / S.010 / pH: x  Gluc: x / Ketone: Trace  / Bili: Negative / Urobili: 1 mg/dL   Blood: x / Protein: 30 mg/dL / Nitrite: Negative   Leuk Esterase: Moderate / RBC: 11-25 /HPF / WBC >50   Sq Epi: x / Non Sq Epi: Few / Bacteria: Many      CAPILLARY BLOOD GLUCOSE  < from: US Transthoracic Echocardiogram w/Doppler Complete (03.15.18 @ 19:20) >    1. Grossly normal left ventricular size and overall systolic function.   Moderate concentric left ventricular hypertrophy. Mild diastolic   dysfunction (stage I).  2. Mild left atrial enlargement.  3. Severe aortic stenosis.   4. Moderate pulmonary hypertension.    < end of copied text >    < from: CT Angio Chest w/ IV Cont (03.15.18 @ 15:22) >      There are bilateral pleural effusions, slightly larger on the left   occupying roughly 30% hemithorax. There is no pulmonary embolism. There   is no pericardial effusion. There is partial consolidation of the left   lower lobe without air bronchograms.  There is mild interstitial opacity   in the nondependent portions of both lower lobes and to a lesser degree   the upper lobes.The aorta is normal in caliber and enhances uniformly.   There is extensive thoracic ankylosis with kyphoscoliosis. Incidental   note is made of multiple small calcified stones in the otherwise normal   gallbladder.    IMPRESSION:    Negative for pulmonary embolism. Bilateral pleural effusions with   associated basilar atelectasis and interstitial pneumonia or pneumonitis      < end of copied text >            I&O's Summary      RADIOLOGY & ADDITIONAL TESTS:    Imaging Personally Reviewed:  [ ] YES  [ ] NO    Consultant(s) Notes Reviewed:  [ ] YES  [ ] NO    Care Discussed with Consultants/Other Providers [ ] YES  [ ] NO

## 2018-03-16 NOTE — PROGRESS NOTE ADULT - SUBJECTIVE AND OBJECTIVE BOX
Neurology follow up note    JESUS [id number]      Interval History:    Patient feels ok no new complaints.    MEDICATIONS    ALBUTerol    0.083% 2.5 milliGRAM(s) Nebulizer every 6 hours  aspirin enteric coated 81 milliGRAM(s) Oral daily  buDESOnide   0.5 milliGRAM(s) Respule 0.5 milliGRAM(s) Inhalation two times a day  carbidopa/levodopa  25/100 2 Tablet(s) Oral <User Schedule>  carbidopa/levodopa CR 50/200 1 Tablet(s) Oral <User Schedule>  carbidopa/levodopa CR 50/200 0.5 Tablet(s) Oral <User Schedule>  docusate sodium 100 milliGRAM(s) Oral two times a day  enalapril 5 milliGRAM(s) Oral daily  enoxaparin Injectable 30 milliGRAM(s) SubCutaneous daily  escitalopram 10 milliGRAM(s) Oral daily  furosemide   Injectable 40 milliGRAM(s) IV Push daily  levothyroxine 100 MICROGram(s) Oral daily  methylPREDNISolone sodium succinate Injectable 20 milliGRAM(s) IV Push every 8 hours  polyethylene glycol 3350 17 Gram(s) Oral daily PRN  sodium chloride 0.65% Nasal 1 Spray(s) Both Nostrils two times a day      Allergies    beta blockers (Hives)  penicillin (Unknown)    Intolerances        Height (cm): 162.56 (03-15 @ 12:38)  Weight (kg): 45.1 (03-15 @ 15:00)  BMI (kg/m2): 17.1 (15 @ 15:00)    Vital Signs Last 24 Hrs  T(C): 37.2 (16 Mar 2018 08:13), Max: 37.4 (15 Mar 2018 12:38)  T(F): 98.9 (16 Mar 2018 08:13), Max: 99.3 (15 Mar 2018 12:38)  HR: 66 (16 Mar 2018 08:20) (63 - 99)  BP: 157/67 (16 Mar 2018 08:20) (96/55 - 168/69)  BP(mean): 94 (16 Mar 2018 08:20) (68 - 94)  RR: 13 (16 Mar 2018 08:20) (13 - 21)  SpO2: 99% (16 Mar 2018 08:20) (93% - 100%)      REVIEW OF SYSTEMS:  Limit or unable to obtain secondary to patient's poor mental status.        On Neurological Examination: The patient is arousable to verbal stimuli, open eyes.      Extraocular movements were intact.     Pupils were equal, round, and reactive bilaterally, 3 mm to 2.      Speech says few word   It was difficult to evaluate for nasolabial folds.      Motor, bilateral upper were 3/5, bilateral lower were in flex position.      Increased tone in all four extremities.    Does not follow commands      GENERAL Exam: Nontoxic , No Acute Distress   	  HEENT:  normocephalic, atraumatic  		  LUNGS:  Decreased bilaterally  	  HEART: Normal S1S2   No murmur RRR        	  GI/ ABDOMEN:  Soft  Non tender    EXTREMITIES:   No Edema  No Clubbing  No Cyanosis No Edema    MUSCULOSKELETAL: decreased Range of Motion all 4 extremities   	   SKIN: Normal  No Ecchymosis               LABS:  CBC Full  -  ( 16 Mar 2018 07:18 )  WBC Count : 5.4 K/uL  Hemoglobin : 9.5 g/dL  Hematocrit : 29.6 %  Platelet Count - Automated : 119 K/uL  Mean Cell Volume : 99.6 fl  Mean Cell Hemoglobin : 31.9 pg  Mean Cell Hemoglobin Concentration : 32.1 gm/dL  Auto Neutrophil # : x  Auto Lymphocyte # : x  Auto Monocyte # : x  Auto Eosinophil # : x  Auto Basophil # : x  Auto Neutrophil % : x  Auto Lymphocyte % : x  Auto Monocyte % : x  Auto Eosinophil % : x  Auto Basophil % : x    Urinalysis Basic - ( 15 Mar 2018 15:59 )    Color: Yellow / Appearance: Turbid / S.010 / pH: x  Gluc: x / Ketone: Trace  / Bili: Negative / Urobili: 1 mg/dL   Blood: x / Protein: 30 mg/dL / Nitrite: Negative   Leuk Esterase: Moderate / RBC: 11-25 /HPF / WBC >50   Sq Epi: x / Non Sq Epi: Few / Bacteria: Many      -    142  |  104  |  20  ----------------------------<  82  3.4<L>   |  32<H>  |  0.75    Ca    8.1<L>      16 Mar 2018 07:18  Mg     2.2         TPro  6.5  /  Alb  2.3<L>  /  TBili  0.4  /  DBili  x   /  AST  20  /  ALT  <10<L>  /  AlkPhos  182<H>      Hemoglobin A1C:     LIVER FUNCTIONS - ( 16 Mar 2018 07:18 )  Alb: 2.3 g/dL / Pro: 6.5 g/dL / ALK PHOS: 182 U/L / ALT: <10 U/L DA / AST: 20 U/L / GGT: x           Vitamin B12   PT/INR - ( 15 Mar 2018 13:33 )   PT: 11.1 sec;   INR: 1.02 ratio         PTT - ( 15 Mar 2018 13:33 )  PTT:34.3 sec      RADIOLOGY    ANALYSIS AND PLAN:  A 89-year-old episode with change in mental status in regards to lethargy, history of Parkinson's disease, hypothyroidism.  1.	For change in mental status and lethargy most likely secondary to metabolic etiologies, respiratory issues.  2.	I would recommend monitor oxygen saturation, BiPAP as needed.  3.	For history of Parkinson's.  I will continue the patient on her Sinemet 25/100 two tablets at 9 a.m., 1 p.m., 5 p.m., 9 p.m.; and Sinemet extended release 200/50 half tablet 07:30 a.m. and one tablet at 10 p.m.  4.	For history of hypothyroidism, continue the patient on her Synthroid.  5.	The patient prognosis at present is guarded.    Thank you for the courtesy of this consultation.    Physical therapy evaluation as tolerated  OOB to chair/ambulation with assistance only if possible.    Greater than 45 minutes spent in direct patient care reviewing  the notes, lab data/ imaging , discussion with multidisciplinary team. Neurology follow up note    JESUS [id number]      Interval History:    Patient feels ok no new complaints.    MEDICATIONS    ALBUTerol    0.083% 2.5 milliGRAM(s) Nebulizer every 6 hours  aspirin enteric coated 81 milliGRAM(s) Oral daily  buDESOnide   0.5 milliGRAM(s) Respule 0.5 milliGRAM(s) Inhalation two times a day  carbidopa/levodopa  25/100 2 Tablet(s) Oral <User Schedule>  carbidopa/levodopa CR 50/200 1 Tablet(s) Oral <User Schedule>  carbidopa/levodopa CR 50/200 0.5 Tablet(s) Oral <User Schedule>  docusate sodium 100 milliGRAM(s) Oral two times a day  enalapril 5 milliGRAM(s) Oral daily  enoxaparin Injectable 30 milliGRAM(s) SubCutaneous daily  escitalopram 10 milliGRAM(s) Oral daily  furosemide   Injectable 40 milliGRAM(s) IV Push daily  levothyroxine 100 MICROGram(s) Oral daily  methylPREDNISolone sodium succinate Injectable 20 milliGRAM(s) IV Push every 8 hours  polyethylene glycol 3350 17 Gram(s) Oral daily PRN  sodium chloride 0.65% Nasal 1 Spray(s) Both Nostrils two times a day      Allergies    beta blockers (Hives)  penicillin (Unknown)    Intolerances        Height (cm): 162.56 (03-15 @ 12:38)  Weight (kg): 45.1 (03-15 @ 15:00)  BMI (kg/m2): 17.1 (15 @ 15:00)    Vital Signs Last 24 Hrs  T(C): 37.2 (16 Mar 2018 08:13), Max: 37.4 (15 Mar 2018 12:38)  T(F): 98.9 (16 Mar 2018 08:13), Max: 99.3 (15 Mar 2018 12:38)  HR: 66 (16 Mar 2018 08:20) (63 - 99)  BP: 157/67 (16 Mar 2018 08:20) (96/55 - 168/69)  BP(mean): 94 (16 Mar 2018 08:20) (68 - 94)  RR: 13 (16 Mar 2018 08:20) (13 - 21)  SpO2: 99% (16 Mar 2018 08:20) (93% - 100%)      REVIEW OF SYSTEMS:  Limit or unable to obtain secondary to patient's poor mental status.        On Neurological Examination: The patient is arousable to verbal stimuli, open eyes.      Extraocular movements were intact.     Pupils were equal, round, and reactive bilaterally, 3 mm to 2.      Speech says few word   It was difficult to evaluate for nasolabial folds.      Motor, bilateral upper were 3/5, bilateral lower were in flex position.      Increased tone in all four extremities.    Does not follow commands      GENERAL Exam: Nontoxic , No Acute Distress   	  HEENT:  normocephalic, atraumatic  		  LUNGS:  Decreased bilaterally  	  HEART: Normal S1S2   No murmur RRR        	  GI/ ABDOMEN:  Soft  Non tender    EXTREMITIES:   No Edema  No Clubbing  No Cyanosis No Edema    MUSCULOSKELETAL: decreased Range of Motion all 4 extremities   	   SKIN: Normal  No Ecchymosis               LABS:  CBC Full  -  ( 16 Mar 2018 07:18 )  WBC Count : 5.4 K/uL  Hemoglobin : 9.5 g/dL  Hematocrit : 29.6 %  Platelet Count - Automated : 119 K/uL  Mean Cell Volume : 99.6 fl  Mean Cell Hemoglobin : 31.9 pg  Mean Cell Hemoglobin Concentration : 32.1 gm/dL  Auto Neutrophil # : x  Auto Lymphocyte # : x  Auto Monocyte # : x  Auto Eosinophil # : x  Auto Basophil # : x  Auto Neutrophil % : x  Auto Lymphocyte % : x  Auto Monocyte % : x  Auto Eosinophil % : x  Auto Basophil % : x    Urinalysis Basic - ( 15 Mar 2018 15:59 )    Color: Yellow / Appearance: Turbid / S.010 / pH: x  Gluc: x / Ketone: Trace  / Bili: Negative / Urobili: 1 mg/dL   Blood: x / Protein: 30 mg/dL / Nitrite: Negative   Leuk Esterase: Moderate / RBC: 11-25 /HPF / WBC >50   Sq Epi: x / Non Sq Epi: Few / Bacteria: Many      -    142  |  104  |  20  ----------------------------<  82  3.4<L>   |  32<H>  |  0.75    Ca    8.1<L>      16 Mar 2018 07:18  Mg     2.2         TPro  6.5  /  Alb  2.3<L>  /  TBili  0.4  /  DBili  x   /  AST  20  /  ALT  <10<L>  /  AlkPhos  182<H>      Hemoglobin A1C:     LIVER FUNCTIONS - ( 16 Mar 2018 07:18 )  Alb: 2.3 g/dL / Pro: 6.5 g/dL / ALK PHOS: 182 U/L / ALT: <10 U/L DA / AST: 20 U/L / GGT: x           Vitamin B12   PT/INR - ( 15 Mar 2018 13:33 )   PT: 11.1 sec;   INR: 1.02 ratio         PTT - ( 15 Mar 2018 13:33 )  PTT:34.3 sec      RADIOLOGY    ANALYSIS AND PLAN:  A 89-year-old episode with change in mental status in regards to lethargy, history of Parkinson's disease, hypothyroidism.  1.	For change in mental status and lethargy most likely secondary to metabolic etiologies, respiratory issues.  2.	I would recommend monitor oxygen saturation, BiPAP as needed.  3.	For history of Parkinson's.  I will continue the patient on her Sinemet 25/100 two tablets at 9 a.m., 1 p.m., 5 p.m., 9 p.m.; and Sinemet extended release 200/50 half tablet 07:30 a.m. and one tablet at 10 p.m.  4.	For history of hypothyroidism, continue the patient on her Synthroid.  5.	The patient prognosis at present is guarded.  6.	spoke to son rocael     Thank you for the courtesy of this consultation.    Physical therapy evaluation as tolerated  OOB to chair/ambulation with assistance only if possible.    Greater than 45 minutes spent in direct patient care reviewing  the notes, lab data/ imaging , discussion with multidisciplinary team.

## 2018-03-16 NOTE — SWALLOW BEDSIDE ASSESSMENT ADULT - ASR SWALLOW ASPIRATION MONITOR
cough/gurgly voice/pneumonia/upper respiratory infection/fever/throat clearing/change of breathing pattern

## 2018-03-16 NOTE — PHYSICAL THERAPY INITIAL EVALUATION ADULT - PERTINENT HX OF CURRENT PROBLEM, REHAB EVAL
pt is a 10 y/o female, admitted from assisted living having SOB, hypoxic, CT angio has pleural effusion left more than rt.

## 2018-03-16 NOTE — SWALLOW BEDSIDE ASSESSMENT ADULT - COMMENTS
Pt alert, cooperative, admitted with respiratory distress and pleural effusions. Baseline congestion noted. Pt presents with oropharyngeal dysphagia characterized by adequate oral prep, labored manipulation/formation of the bolus, latent AP transport, swallow delay. Overt s/s of aspiration noted for thin liquids. Recommend dysphagia 1 puree consistencies with nectar thickened liquids, 1:1. Discussed with RN.

## 2018-03-16 NOTE — DIETITIAN INITIAL EVALUATION ADULT. - PERTINENT LABORATORY DATA
(3-16-18) Hgb 9.5 (low), Hct 29.6 (low), Potassium 3.4 (low), Calcium 8.1 (low), Albumin 2.3 (low), Alk phos 182 (high), ALT/SGPT <10 (low), (3-15-18) c-reactive protein 4.20 (high)

## 2018-03-16 NOTE — PROGRESS NOTE ADULT - SUBJECTIVE AND OBJECTIVE BOX
Patient is a 89y Female with a known history of :  Human metapneumovirus (hMPV) pneumonia (J12.3)  Hypothyroidism, unspecified type (E03.9)  Parkinson's disease (G20)  Essential hypertension (I10)  Pleural effusion (J90)  Respiratory distress (R06.03)    HPI:  89 yr ol resident of [facility] assisted living [facility], with PMH of Accelerated hypertension  Gastric reflux H/O TIA (transient ischemic attack) and stroke  History of mitral valve prolapse  Hypothyroid  Parkinson's disease  was having SOB, and was found to be hypoxic in facility and sent to hospital, In Ed pt is sob, oxygenating well, but markedly congested , altered mental state and had elevated D Dimer had CT angio has pleural effusion left more than rt.  . Unremarkable noncontrast CT scan of the brain.     Likely having [facility] acute TTE ordered, cardio consult called, had b/l pleural effusion, pulmonary consult called, mild increase in LFT.  [facility] has many pts with hMPV infection , will try to out viral illness,  pt denies any bowel or urinary complaint, no fever (15 Mar 2018 15:43)      MEDICATIONS  (STANDING):  ALBUTerol    0.083% 2.5 milliGRAM(s) Nebulizer every 6 hours  aspirin enteric coated 81 milliGRAM(s) Oral daily  buDESOnide   0.5 milliGRAM(s) Respule 0.5 milliGRAM(s) Inhalation two times a day  carbidopa/levodopa  25/100 2 Tablet(s) Oral <User Schedule>  carbidopa/levodopa CR 50/200 1 Tablet(s) Oral <User Schedule>  carbidopa/levodopa CR 50/200 0.5 Tablet(s) Oral <User Schedule>  docusate sodium 100 milliGRAM(s) Oral two times a day  enalapril 5 milliGRAM(s) Oral daily  enoxaparin Injectable 30 milliGRAM(s) SubCutaneous daily  escitalopram 10 milliGRAM(s) Oral daily  furosemide   Injectable 40 milliGRAM(s) IV Push daily  lactobacillus acidophilus 1 Tablet(s) Oral three times a day with meals  levoFLOXacin IVPB 500 milliGRAM(s) IV Intermittent every 24 hours  levothyroxine 100 MICROGram(s) Oral daily  methylPREDNISolone sodium succinate Injectable 20 milliGRAM(s) IV Push every 8 hours  potassium chloride    Tablet ER 40 milliEquivalent(s) Oral once  sodium chloride 0.65% Nasal 1 Spray(s) Both Nostrils two times a day    MEDICATIONS  (PRN):  polyethylene glycol 3350 17 Gram(s) Oral daily PRN Constipation      ALLERGIES: beta blockers (Hives)  penicillin (Unknown)      FAMILY HISTORY:      Social history:  Alochol:   Smoking:   Drug Use:   Marital Status:     PHYSICAL EXAMINATION:  -----------------------------  T(C): 37.2 (03-16-18 @ 08:13), Max: 37.4 (03-15-18 @ 12:38)  HR: 66 (03-16-18 @ 08:20) (63 - 99)  BP: 157/67 (03-16-18 @ 08:20) (96/55 - 168/69)  RR: 13 (03-16-18 @ 08:20) (13 - 21)  SpO2: 99% (03-16-18 @ 08:20) (93% - 100%)  Wt(kg): --    Height (cm): 162.56 (03-15 @ 12:38)  Weight (kg): 45.1 (03-15 @ 15:00)  BMI (kg/m2): 17.1 (03-15 @ 15:00)  BSA (m2): 1.45 (03-15 @ 15:00)    Constitutional: frail, deep sleep   Neck: normal jugular venous A waves present, normal jugular venous V waves present and no jugular venous alonzo A waves. Carotid upstroke good  Pulmonary: no respiratory distress, normal respiratory rhythm and effort, no accessory muscle use and lungs were clear to auscultation bilaterally.   Cardiovascular: heart rate and rhythm were normal, S2 good.  II/VI systolic blow RSB  Musculoskeletal: the gait could not be assessed..   Extremities: no clubbing of the fingernails, no localized cyanosis, no petechial hemorrhages and no ischemic changes.     LABS:   --------  CBC Full  -  ( 16 Mar 2018 07:18 )  WBC Count : 5.4 K/uL  Hemoglobin : 9.5 g/dL  Hematocrit : 29.6 %  Platelet Count - Automated : 119 K/uL  Mean Cell Volume : 99.6 fl  Mean Cell Hemoglobin : 31.9 pg  Mean Cell Hemoglobin Concentration : 32.1 gm/dL  Auto Neutrophil # : x  Auto Lymphocyte # : x  Auto Monocyte # : x  Auto Eosinophil # : x  Auto Basophil # : x  Auto Neutrophil % : x  Auto Lymphocyte % : x  Auto Monocyte % : x  Auto Eosinophil % : x  Auto Basophil % : x      03-16    142  |  104  |  20< from: US Transthoracic Echocardiogram w/Doppler Complete (03.15.18 @ 19:20) >  EXAM:  US TTE W DOPPLER COMPLETE                                  PROCEDURE DATE:  03/15/2018          INTERPRETATION:  Ordering Physician: DADA LOZANO [id number]    Indication: Shortness of breath.    Technician: Lashawn Anand    Study Quality: Fair.  Patient could not be optimally positioned.    Height: 5 feet 4 inches  Weight: 120 pounds  Blood Pressure: 96/55    MEASUREMENTS  IVS: 1.3 cm  PWT: 1.3 cm  LA: 4.1 cm  Aortic Root: 2.4 cm  LVIDd: 2.9 cm  LVIDs: 1.8 cm    LVEF: Approximately 65-70%    FINDINGS  Left Ventricle: Endocardium not well visualized. Grossly normal left   ventricular size and overall systolic function. Moderate concentric left   ventricular hypertrophy. Mild diastolic dysfunction (stage I).  Right Ventricle: Normal right ventricular size and function.  Left Atrium: Mild left atrial enlargement.  Right Atrium: Normal right atrium.  Mitral Valve: Mitral annular calcification, otherwise normal mitral valve.  Aortic Valve: Calcified aortic valve with decreased opening. The peak and   mean transaortic gradients are 98 mmHg and 57 mmHg, respectively, and the   peak velocity is 4.9 m/s, consistent with severe aortic stenosis. The   calculated aortic valve area is approximately 0.4 sq cm.  Tricuspid Valve: Normal tricuspid valve. Mild tricuspid regurgitation.   Pulmonary artery systolic pressure estimated at 30 mmHg, assuming a right   atrial pressure of 3 mmHg, consistent with moderate pulmonary   hypertension.  Pulmonic Valve: Pulmonic valve not well visualized; grossly normal.  Pericardium/Pleura: No pericardial effusion.      CONCLUSIONS:  1. Grossly normal left ventricular size and overall systolic function.   Moderate concentric left ventricular hypertrophy. Mild diastolic   dysfunction (stage I).  2. Mild left atrial enlargement.  3. Severe aortic stenosis.   4. Moderate pulmonary hypertension.                    TO CASTILLO M.D., ATTENDING CARDIOLOGIST  This document ha    < end of copied text >    ----------------------------<  82  3.4<L>   |  32<H>  |  0.75    Ca    8.1<L>      16 Mar 2018 07:18  Mg     2.2     03-16    TPro  6.5  /  Alb  2.3<L>  /  TBili  0.4  /  DBili  x   /  AST  20  /  ALT  <10<L>  /  AlkPhos  182<H>  03-16       PT/INR - ( 15 Mar 2018 13:33 )   PT: 11.1 sec;   INR: 1.02 ratio         PTT - ( 15 Mar 2018 13:33 )  PTT:34.3 sec  03-15 @ 13:33 BNP: 3927 pg/mL    /03-15 @ 13:33 CPK total:--, CKMB --, Troponin I - .008 ng/mL<L>  :  In ICU:  Tele = NSR occasional PAC's  Echo = mild LVH normal EF and severe Aortic Stenosis, but on physical exam, the AS is not severe.  Abd Sono = bilat effusions.  Duplex = no DVT        Radiology:

## 2018-03-16 NOTE — CONSULT NOTE ADULT - SUBJECTIVE AND OBJECTIVE BOX
Date/Time Patient Seen:  		  Referring MD:   Data Reviewed	       Patient is a 89y old  Female who presents with a chief complaint of resp distress      Subjective/HPI    in bed  seen and examined  vs and meds reviewed  resp distress noted  recently outbreak of human Metapneumovirus infection was noted in [facility]  now this patient, who is a poor historian is noted to have dyspnea, cough, resp distress    no fever  no chills  no recent travel         PAST MEDICAL & SURGICAL HISTORY:  History of mitral valve prolapse  Gastric reflux  H/O TIA (transient ischemic attack) and stroke  Hypothyroid  Hypothyroidism, acquired  Parkinson's disease  Accelerated hypertension  History of tonsillectomy  History of cataract surgery  Hx of lumpectomy  History of appendectomy  History of ovarian cystectomy        Medication list         MEDICATIONS  (STANDING):  ALBUTerol    0.083% 2.5 milliGRAM(s) Nebulizer every 6 hours  aspirin enteric coated 81 milliGRAM(s) Oral daily  buDESOnide   0.5 milliGRAM(s) Respule 0.5 milliGRAM(s) Inhalation two times a day  enoxaparin Injectable 30 milliGRAM(s) SubCutaneous daily  escitalopram 10 milliGRAM(s) Oral daily  furosemide   Injectable 40 milliGRAM(s) IV Push daily  levothyroxine 100 MICROGram(s) Oral daily  methylPREDNISolone sodium succinate Injectable 20 milliGRAM(s) IV Push every 8 hours  sodium chloride 0.65% Nasal 1 Spray(s) Both Nostrils two times a day  sodium chloride 0.9% Bolus 1000 milliLiter(s) IV Bolus every 1 hour    MEDICATIONS  (PRN):         Vitals log        ICU Vital Signs Last 24 Hrs  T(C): 37.4 (15 Mar 2018 12:38), Max: 37.4 (15 Mar 2018 12:38)  T(F): 99.3 (15 Mar 2018 12:38), Max: 99.3 (15 Mar 2018 12:38)  HR: 76 (15 Mar 2018 12:38) (76 - 76)  BP: 96/55 (15 Mar 2018 12:38) (96/55 - 96/55)  BP(mean): --  ABP: --  ABP(mean): --  RR: 20 (15 Mar 2018 12:38) (20 - 20)  SpO2: 93% (15 Mar 2018 12:38) (93% - 93%)           Input and Output:  I&O's Detail      Lab Data                        10.3   6.1   )-----------( 135      ( 15 Mar 2018 13:19 )             31.4     03-15    136  |  101  |  26<H>  ----------------------------<  127<H>  3.9   |  29  |  0.85    Ca    8.1<L>      15 Mar 2018 13:33    TPro  7.1  /  Alb  2.7<L>  /  TBili  0.4  /  DBili  x   /  AST  23  /  ALT  25  /  AlkPhos  209<H>  03-15      CARDIAC MARKERS ( 15 Mar 2018 13:33 )  .008 ng/mL / x     / 33 U/L / x     / 0.7 ng/mL        Review of Systems	  resp distress      Objective     Physical Examination  rales  crackles  wheezing  abd dist.   soft  poor dentition  frail and weak  extr chr changes and varicose veins        Pertinent Lab findings & Imaging      Garcia:  NO   Adequate UO     I&O's Detail           Discussed with:     Cultures:	        Radiology        EXAM:  XR CHEST PORTABLE URGENT 1V                                  PROCEDURE DATE:  03/15/2018          INTERPRETATION:  Single view chest    History shortness of breath    Comparison 02/21/18    There is mild to moderate cardiomegaly. There are small bilateral pleural   effusions, larger on the left versus the right with associated   compressive atelectasis. Both are stable in size. The nondependent   portions of the lungs are clear. There is no donovan central edema.    IMPRESSION:    Stable findings                  ALEENA CEDEÑO M.D., ATTENDING RADIOLOGIST  This document has been electronically signed. Mar 15 2018  1:28PM              EXAM:  CT BRAIN                                  PROCEDURE DATE:  03/15/2018          INTERPRETATION:  HISTORY: Injury    Comparison 02/21/18    Evaluation demonstrates no evidence of mass-effect or midline shift. No   intraparenchymal mass lesions or hemorrhage is identified.     There is   no evidence of hydrocephalus. No extra-axial collections are noted.    The bone windows demonstrate no gross osseous abnormalities.        Impression:  1. Unremarkable noncontrast CT scan of the brain.                            ALEENA CEDEÑO M.D., ATTENDING RADIOLOGIST  This document has been electronically signed. Mar 15 2018  1:48PM
CARDIOLOGY CONSULT NOTE    Patient is a 89y Female with a known history of :  Hypothyroidism, unspecified type (E03.9)  Parkinson's disease (G20)  Essential hypertension (I10)  Pleural effusion (J90)  Respiratory distress (R06.03)    HPI:  89 yr ol resident of [facility] assisted living facility, with PMH of Accelerated hypertension  Gastric reflux H/O TIA (transient ischemic attack) and stroke  History of mitral valve prolapse  Hypothyroid  Parkinson's disease  was having SOB, and was found to be hypoxic in facility and sent to hospital, In Ed pt is sob, oxygenating well, but markedly congested , altered mental state and had elevated D Dimer had CT angio has pleural effusion left more than rt.  . Unremarkable noncontrast CT scan of the brain.     Likely having [facility] acute TTE ordered, cardio consult called, had b/l pleural effusion, pulmonary consult called, mild increase in LFT.  [facility] has many pts with hMPV infection , will try to out viral illness,  pt denies any bowel or urinary complaint, no fever (15 Mar 2018 15:43)      REVIEW OF SYSTEMS:  In deep sleep      MUSCULOSKELETAL: No joint pain or swelling; No muscle, back, or extremity pain  PSYCHIATRIC: No depression, anxiety, mood swings, or difficulty sleeping    ALLERGY AND IMMUNOLOGIC: No hives or eczema    MEDICATIONS  (STANDING):  ALBUTerol    0.083% 2.5 milliGRAM(s) Nebulizer every 6 hours  aspirin enteric coated 81 milliGRAM(s) Oral daily  buDESOnide   0.5 milliGRAM(s) Respule 0.5 milliGRAM(s) Inhalation two times a day  carbidopa/levodopa  25/100 2 Tablet(s) Oral four times a day  docusate sodium 100 milliGRAM(s) Oral two times a day  enalapril 5 milliGRAM(s) Oral daily  enoxaparin Injectable 30 milliGRAM(s) SubCutaneous daily  escitalopram 10 milliGRAM(s) Oral daily  furosemide   Injectable 40 milliGRAM(s) IV Push daily  levothyroxine 100 MICROGram(s) Oral daily  methylPREDNISolone sodium succinate Injectable 20 milliGRAM(s) IV Push every 8 hours  sodium chloride 0.65% Nasal 1 Spray(s) Both Nostrils two times a day    MEDICATIONS  (PRN):  polyethylene glycol 3350 17 Gram(s) Oral daily PRN Constipation      ALLERGIES: beta blockers (Hives)  penicillin (Unknown)      FAMILY HISTORY:      Social History:  Alochol:   Smoking:   Drug Use:   Marital Status:     PHYSICAL EXAMINATION:  -----------------------------  T(C): 36.8 (03-15-18 @ 15:00), Max: 37.4 (03-15-18 @ 12:38)  HR: 75 (03-15-18 @ 15:00) (75 - 76)  BP: 168/69 (03-15-18 @ 15:00) (96/55 - 168/69)  RR: 19 (03-15-18 @ 15:00) (19 - 20)  SpO2: 98% (03-15-18 @ 15:00) (93% - 98%)  Wt(kg): --    Height (cm): 162.56 (03-15 @ 12:38)  Weight (kg): 54.4 (03-15 @ 12:38)  BMI (kg/m2): 20.6 (03-15 @ 12:38)  BSA (m2): 1.57 (03-15 @ 12:38)    Constitutional: frail, asleep on BiPap  HEENT: normal oral mucosa, no oral pallor and no oral cyanosis.   Neck: normal jugular venous A waves present, normal jugular venous V waves present and no jugular venous alonzo A waves.   Pulmonary: good air flow.  No creps, no wheezing  Cardiovascular: heart rate and rhythm were normal, normal S1 and S2 distant  Musculoskeletal: the gait could not be assessed..   Extremities: no clubbing of the fingernails, no localized cyanosis, no petechial hemorrhages and no ischemic changes.   Psychiatric: asleep    LABS:   --------    CBC Full  -  ( 15 Mar 2018 13:19 )  WBC Count : 6.1 K/uL  Hemoglobin : 10.3 g/dL  Hematocrit : 31.4 %  Platelet Count - Automated : 135 K/uL  Mean Cell Volume : 99.3 fl  Mean Cell Hemoglobin : 32.7 pg  Mean Cell Hemoglobin Concentration : 32.9 gm/dL  Auto Neutrophil # : 4.5 K/uL  Auto Lymphocyte # : 0.9 K/uL  Auto Monocyte # : 0.6 K/uL  Auto Eosinophil # : 0.0 K/uL  Auto Basophil # : 0.0 K/uL  Auto Neutrophil % : 73.8 %  Auto Lymphocyte % : 15.4 %  Auto Monocyte % : 09.8 %  Auto Eosinophil % : 0.4 %  Auto Basophil % : 0.5 %        03-15    136  |  101  |  26<H>  ----------------------------<  127<H>  3.9   |  29  |  0.85    Ca    8.1<L>      15 Mar 2018 13:33  Mg     2.4     03-15    TPro  7.1  /  Alb  2.7<L>  /  TBili  0.4  /  DBili  x   /  AST  23  /  ALT  25  /  AlkPhos  209<H>  03-15      CARDIAC MARKERS ( 15 Mar 2018 13:33 )  .008 ng/mL / x     / 33 U/L / x     / 0.7 ng/mL         PT/INR - ( 15 Mar 2018 13:33 )   PT: 11.1 sec;   INR: 1.02 ratio         PTT - ( 15 Mar 2018 13:33 )  PTT:34.3 sec  03-15 @ 13:33 BNP: 3927 pg/mL    03-15 @ 13:33 CPK total:--, CKMB --, Troponin I - .008 ng/mL<L>            RADIOLOGY:  Bilat effusions (as seen previously) no central edema  -----------------  3/15/2018:  In ER.  On Bi PAP asleep  [facility] bedside    ECG: NSR, no acute changes    ECHO: await
HPI:  89 YO F resident of [facility] assisted living facility PMH of HTN CVA MVP Parkinson's   disease admitted with CC of SOB, and hypoxia sent to hospital. Afebrile. No n/v/diarrhea.  Pt poor historian. RVP + hMPV. CT chest with bilateral effusions and compressive atelectasis.  UA positive. Per nurse Urine with foul smell.    Infectious Disease consult was called to evaluate pt and for antibiotic management.    Past Medical & Surgical Hx:  PAST MEDICAL & SURGICAL HISTORY:  History of mitral valve prolapse  Gastric reflux  H/O TIA (transient ischemic attack) and stroke  Hypothyroid  Hypothyroidism, acquired  Parkinson's disease  Accelerated hypertension  History of tonsillectomy  History of cataract surgery  Hx of lumpectomy  History of appendectomy  History of ovarian cystectomy    Social History--  EtOH: denies   Tobacco: denies   Drug Use: denies     FAMILY HISTORY:  Noncontributory    Allergies  beta blockers (Hives)  penicillin (Unknown)      Home Medications:  acetaminophen-diphenhydramine 500 mg-25 mg oral capsule:  (03 Dec 2017 18:30)  aspirin 81 mg oral delayed release capsule:  orally once a day (03 Dec 2017 18:30)  carbidopa-levodopa 25 mg-100 mg oral tablet: 2  orally 4 times a day (03 Dec 2017 18:30)  carbidopa-levodopa 50 mg-200 mg oral tablet, extended release: 1 tab(s) orally every 6 hours (03 Dec 2017 18:30)  Centrum oral tablet, chewable: 1 tab(s) orally once a day (03 Dec 2017 18:30)  Colace 100 mg oral capsule:  orally 3 times a day (03 Dec 2017 18:00)  docusate potassium 100 mg oral capsule:  (03 Dec 2017 18:30)  enalapril 5 mg oral tablet: 1 tab(s) orally once a day (03 Dec 2017 18:30)  ergocalciferol 50,000 intl units (1.25 mg) oral capsule: 1 cap(s) orally every other week (03 Dec 2017 18:30)  Flonase 50 mcg/inh nasal spray: 1 spray(s) nasal once a day (03 Dec 2017 18:30)  levothyroxine 100 mcg (0.1 mg) oral tablet: 1 tab(s) orally once a day (03 Dec 2017 18:30)  Lexapro 10 mg oral tablet: 1 tab(s) orally once a day (03 Dec 2017 18:30)  loratadine 10 mg oral tablet: 1 tab(s) orally once a day (03 Dec 2017 18:30)  Milk of Magnesia:  (03 Dec 2017 18:30)  MiraLax oral powder for reconstitution:  (03 Dec 2017 18:30)  Ocuvite Lutein oral capsule: 1 cap(s) orally 2 times a day (03 Dec 2017 18:00)  omeprazole 20 mg oral delayed release capsule:  orally 2 times a day (03 Dec 2017 18:00)  polyethylene glycol 3350 oral powder for reconstitution:  orally every 3 days (03 Dec 2017 18:00)  senna 8.6 mg oral tablet: 2  orally once a day (at bedtime) (03 Dec 2017 18:30)  Senna 8.6 mg oral tablet: 1 tab(s) orally once a day (at bedtime) (03 Dec 2017 18:30)  Systane ophthalmic solution: 1 drop(s) to each affected eye 2 times a day (03 Dec 2017 18:00)  Systane ophthalmic solution: 1 drop(s) to each affected eye 2 times a day (03 Dec 2017 18:30)  zinc oxide topical ointment:  (03 Dec 2017 18:30)    Current Inpatient Medications :    ANTIBIOTICS:   levoFLOXacin IVPB 500 milliGRAM(s) IV Intermittent every 24 hours      OTHER RELEVANT MEDICATIONS :  ALBUTerol    0.083% 2.5 milliGRAM(s) Nebulizer every 6 hours  aspirin enteric coated 81 milliGRAM(s) Oral daily  buDESOnide   0.5 milliGRAM(s) Respule 0.5 milliGRAM(s) Inhalation two times a day  carbidopa/levodopa  25/100 2 Tablet(s) Oral <User Schedule>  carbidopa/levodopa CR 50/200 1 Tablet(s) Oral <User Schedule>  carbidopa/levodopa CR 50/200 0.5 Tablet(s) Oral <User Schedule>  docusate sodium 100 milliGRAM(s) Oral two times a day  enalapril 5 milliGRAM(s) Oral daily  enoxaparin Injectable 30 milliGRAM(s) SubCutaneous daily  escitalopram 10 milliGRAM(s) Oral daily  furosemide   Injectable 40 milliGRAM(s) IV Push daily  levothyroxine 100 MICROGram(s) Oral daily  methylPREDNISolone sodium succinate Injectable 20 milliGRAM(s) IV Push every 8 hours  polyethylene glycol 3350 17 Gram(s) Oral daily PRN  sodium chloride 0.65% Nasal 1 Spray(s) Both Nostrils two times a day    ROS:  CONSTITUTIONAL:  Negative fever or chills  EYES:  Negative  blurry vision or double vision  CARDIOVASCULAR:  Negative for chest pain or palpitations  RESPIRATORY:  Negative for cough, wheezing, + SOB   GASTROINTESTINAL:  Negative for nausea, vomiting, diarrhea, constipation, or abdominal pain  GENITOURINARY:  Negative frequency, urgency , dysuria or hematuria   NEUROLOGIC:  No headache, confusion, dizziness, lightheadedness  All other systems were reviewed and are negative    I&O's Detail      Physical Exam:  Vital Signs Last 24 Hrs  T(C): 36.8 (16 Mar 2018 16:00), Max: 37.2 (16 Mar 2018 08:13)  T(F): 98.3 (16 Mar 2018 16:00), Max: 98.9 (16 Mar 2018 08:13)  HR: 77 (16 Mar 2018 16:00) (63 - 99)  BP: 153/61 (16 Mar 2018 16:00) (111/49 - 160/66)  BP(mean): 88 (16 Mar 2018 16:00) (68 - 127)  RR: 17 (16 Mar 2018 16:00) (13 - 21)  SpO2: 97% (16 Mar 2018 16:00) (94% - 100%)      General: weak   Eyes: sclera anicteric, pupils equal and reactive to light  ENMT: buccal mucosa moist, pharynx not injected  Neck: supple, trachea midline  Lungs: clear, no wheeze/rhonchi  Cardiovascular: regular rate and rhythm, S1 S2  Abdomen: soft, nontender, no organomegaly present, bowel sounds normal  Neurological:  alert and oriented x 2    Skin:no increased ecchymosis/petechiae/purpura  Lymph Nodes: no palpable cervical/supraclavicular lymph nodes enlargements  Extremities: no cyanosis/clubbing/edema    Labs:                         9.5    5.4   )-----------( 119      ( 16 Mar 2018 07:18 )             29.6   	  03-16    142  |  104  |  20  ----------------------------<  82  3.4<L>   |  32<H>  |  0.75    Ca    8.1<L>      16 Mar 2018 07:18  Mg     2.2     03-16    TPro  6.5  /  Alb  2.3<L>  /  TBili  0.4  /  DBili  x   /  AST  20  /  ALT  <10<L>  /  AlkPhos  182<H>  03-16    RECENT CULTURES:  Rapid Respiratory Viral Panel (03.15.18 @ 15:01)    Rapid RVP Result: Detected: The [employer] RVP Rapid uses polymerase chain reaction (PCR) and melt  curve analysis to screen for adenovirus; coronavirus HKU1, NL63, 229E,  OC43; human metapneumovirus (hMPV); human enterovirus/rhinovirus  (Entero/RV); influenza A; influenza A/H1;influenza A/H3; influenza  A/H1-2009; influenza B; parainfluenza viruses 1, 2, 3, 4; respiratory  syncytial virus; Bordetella pertussis; Mycoplasma pneumoniae; and  Chlamydophila pneumoniae.    hMPV (RapRVP): Detected    RADIOLOGY & ADDITIONAL STUDIES:    EXAM:  CT ANGIO CHEST (W)AW IC                            PROCEDURE DATE:  03/15/2018      INTERPRETATION:  CT angiogram chest with contrast    History shortness of breath    Contrast Omnipaque 90 cc; 10 cc discarded    Axial MIPS included    There are bilateral pleural effusions, slightly larger on the left   occupying roughly 30% hemithorax. There is no pulmonary embolism. There   is no pericardial effusion. There is partial consolidation of the left   lower lobe without air bronchograms.  There is mild interstitial opacity   in the nondependent portions of both lower lobes and to a lesser degree   the upper lobes.The aorta is normal in caliber and enhances uniformly.   There is extensive thoracic ankylosis with kyphoscoliosis. Incidental   note is made of multiple small calcified stones in the otherwise normal   gallbladder.    IMPRESSION:    Negative for pulmonary embolism. Bilateral pleural effusions with   associated basilar atelectasis and interstitial pneumonia or pneumonitis      Assessment :   89 YO F resident of [facility] [place] assisted living facility PMH of HTN CVA MVP Parkinson's   disease admitted with SOB sec hMPV bronchitis with pneumonitis  Rasheed pleural effusions  CHF  ?UTI  hx ESBL  Orophyrangeal dysphagia    Plan :   Change to Ertepenam  Dc Levaquin  Fu cultures  Aspiration precautions    Continue with present regiment .  Appropriate use of antibiotics and adverse effects reviewed.    I have discussed the above plan of care with patient's family in detail. They expressed understanding of the treatment plan . Risks, benefits and alternatives discussed in detail. I have asked if they have any questions or concerns and appropriately addressed them to the best of my ability .      Critical care> 45 minutes spent in direct patient care reviewing  the notes, lab data/ imaging , discussion with multidisciplinary team. All questions were addressed and answered to the best of my capacity .    Thank you for allowing me to participate in the care of your patient .      Santa Chavez MD  Infectious Disease  [phone]

## 2018-03-16 NOTE — SWALLOW BEDSIDE ASSESSMENT ADULT - PHARYNGEAL PHASE
Delayed pharyngeal swallow/Cough post oral intake/Decreased laryngeal elevation Delayed pharyngeal swallow/Decreased laryngeal elevation

## 2018-03-16 NOTE — PROGRESS NOTE ADULT - SUBJECTIVE AND OBJECTIVE BOX
Date/Time Patient Seen:  		  Referring MD:   Data Reviewed	       Patient is a 89y old  Female who presents with a chief complaint of sob, Hypoxia (15 Mar 2018 15:43)  in bed  seen and examined  vs and meds reviewed  on and off BIPAP  on isolation precs        Subjective/HPI     PAST MEDICAL & SURGICAL HISTORY:  History of mitral valve prolapse  Gastric reflux  H/O TIA (transient ischemic attack) and stroke  Hypothyroid  Hypothyroidism, acquired  Parkinson's disease  Accelerated hypertension  History of tonsillectomy  History of cataract surgery  Hx of lumpectomy  History of appendectomy  History of ovarian cystectomy        Medication list         MEDICATIONS  (STANDING):  ALBUTerol    0.083% 2.5 milliGRAM(s) Nebulizer every 6 hours  aspirin enteric coated 81 milliGRAM(s) Oral daily  buDESOnide   0.5 milliGRAM(s) Respule 0.5 milliGRAM(s) Inhalation two times a day  carbidopa/levodopa  25/100 2 Tablet(s) Oral <User Schedule>  carbidopa/levodopa CR 50/200 1 Tablet(s) Oral <User Schedule>  carbidopa/levodopa CR 50/200 0.5 Tablet(s) Oral <User Schedule>  docusate sodium 100 milliGRAM(s) Oral two times a day  enalapril 5 milliGRAM(s) Oral daily  enoxaparin Injectable 30 milliGRAM(s) SubCutaneous daily  escitalopram 10 milliGRAM(s) Oral daily  furosemide   Injectable 40 milliGRAM(s) IV Push daily  levothyroxine 100 MICROGram(s) Oral daily  methylPREDNISolone sodium succinate Injectable 20 milliGRAM(s) IV Push every 8 hours  sodium chloride 0.65% Nasal 1 Spray(s) Both Nostrils two times a day    MEDICATIONS  (PRN):  polyethylene glycol 3350 17 Gram(s) Oral daily PRN Constipation         Vitals log        ICU Vital Signs Last 24 Hrs  T(C): 36.5 (16 Mar 2018 04:07), Max: 37.4 (15 Mar 2018 12:38)  T(F): 97.7 (16 Mar 2018 04:07), Max: 99.3 (15 Mar 2018 12:38)  HR: 66 (16 Mar 2018 06:00) (63 - 99)  BP: 158/64 (16 Mar 2018 06:00) (96/55 - 168/69)  BP(mean): 91 (16 Mar 2018 06:00) (68 - 91)  ABP: --  ABP(mean): --  RR: 21 (16 Mar 2018 06:00) (15 - 21)  SpO2: 98% (16 Mar 2018 06:00) (93% - 100%)           Input and Output:  I&O's Detail      Lab Data                        10.3   6.1   )-----------( 135      ( 15 Mar 2018 13:19 )             31.4     03-15    136  |  101  |  26<H>  ----------------------------<  127<H>  3.9   |  29  |  0.85    Ca    8.1<L>      15 Mar 2018 13:33  Mg     2.4     03-15    TPro  7.1  /  Alb  2.7<L>  /  TBili  0.4  /  DBili  x   /  AST  23  /  ALT  25  /  AlkPhos  209<H>  03-15      CARDIAC MARKERS ( 15 Mar 2018 13:33 )  .008 ng/mL / x     / 33 U/L / x     / 0.7 ng/mL        Review of Systems	      Objective     Physical Examination    frail  weak  lung dec BS  abd soft  confused      Pertinent Lab findings & Imaging      Garcia:  NO   Adequate UO     I&O's Detail           Discussed with:     Cultures:	        Radiology

## 2018-03-16 NOTE — DIETITIAN INITIAL EVALUATION ADULT. - PERTINENT MEDS FT
KCl, levaquin, carbidopa/levodopa, colace, vasotec, lovenox, lexapro lasix, lactobacillus acidophilus, levothyroxine, methylprednisone, miralax

## 2018-03-16 NOTE — PROGRESS NOTE ADULT - SUBJECTIVE AND OBJECTIVE BOX
89y  Female  beta blockers (Hives)  penicillin (Unknown)    CC: Patient is a 89y old  Female who presents with a chief complaint of sob, Hypoxia     HPI:  89 year old female who presented from a local skilled nursing facility having SOB with hypoxia. In the ER she was found to have marked congestion, SOB, bilateral plural effusions and elevated D Dimer but found not to have PE on CT angio. She was admitted found to have vial pnumonia with human meta pneumovirus has been on off bipap due to repsiratory diffivluty all day. Currently BIPAP is off and she has oxygen saturations in mid 90's and no increased work of breathing. Tonight showing signs of delirium /agitation, sitting up, flailing in the bed and kicking staff.        PAST MEDICAL & SURGICAL HISTORY:  History of mitral valve prolapse  Gastric reflux  H/O TIA (transient ischemic attack) and stroke  Hypothyroid  Hypothyroidism, acquired  Parkinson's disease  Accelerated hypertension  History of tonsillectomy  History of cataract surgery  Hx of lumpectomy  History of appendectomy  History of ovarian cystectomy    FAMILY HISTORY:      Vitals   Vital Signs Last 24 Hrs  T(C): 36.8 (16 Mar 2018 16:00), Max: 37.2 (16 Mar 2018 08:13)  T(F): 98.3 (16 Mar 2018 16:00), Max: 98.9 (16 Mar 2018 08:13)  HR: 86 (16 Mar 2018 22:00) (63 - 87)  BP: 190/71 (16 Mar 2018 22:00) (118/69 - 190/71)  BP(mean): 103 (16 Mar 2018 22:00) (68 - 127)  RR: 18 (16 Mar 2018 22:00) (13 - 25)  SpO2: 94% (16 Mar 2018 22:00) (92% - 100%)    I&O's Summary    16 Mar 2018 07:01  -  16 Mar 2018 23:10  --------------------------------------------------------  IN: 240 mL / OUT: 0 mL / NET: 240 mL        LABS  -    142  |  104  |  20  ----------------------------<  82  3.4<L>   |  32<H>  |  0.75    Ca    8.1<L>      16 Mar 2018 07:18  Mg     2.2     -    TPro  6.5  /  Alb  2.3<L>  /  TBili  0.4  /  DBili  x   /  AST  20  /  ALT  <10<L>  /  AlkPhos  182<H>                            9.5    5.4   )-----------( 119      ( 16 Mar 2018 07:18 )             29.6     PT/INR - ( 15 Mar 2018 13:33 )   PT: 11.1 sec;   INR: 1.02 ratio         PTT - ( 15 Mar 2018 13:33 )  PTT:34.3 sec  CARDIAC MARKERS ( 15 Mar 2018 13:33 )  .008 ng/mL / x     / 33 U/L / x     / 0.7 ng/mL      LIVER FUNCTIONS - ( 16 Mar 2018 07:18 )  Alb: 2.3 g/dL / Pro: 6.5 g/dL / ALK PHOS: 182 U/L / ALT: <10 U/L DA / AST: 20 U/L / GGT: x           CAPILLARY BLOOD GLUCOSE        Urinalysis Basic - ( 15 Mar 2018 15:59 )    Color: Yellow / Appearance: Turbid / S.010 / pH: x  Gluc: x / Ketone: Trace  / Bili: Negative / Urobili: 1 mg/dL   Blood: x / Protein: 30 mg/dL / Nitrite: Negative   Leuk Esterase: Moderate / RBC: 11-25 /HPF / WBC >50   Sq Epi: x / Non Sq Epi: Few / Bacteria: Many        VENT SETTINGS       Meds  MEDICATIONS  (STANDING):  ALBUTerol    0.083% 2.5 milliGRAM(s) Nebulizer every 6 hours  aspirin enteric coated 81 milliGRAM(s) Oral daily  buDESOnide   0.5 milliGRAM(s) Respule 0.5 milliGRAM(s) Inhalation two times a day  carbidopa/levodopa  25/100 2 Tablet(s) Oral <User Schedule>  carbidopa/levodopa CR 50/200 1 Tablet(s) Oral <User Schedule>  carbidopa/levodopa CR 50/200 0.5 Tablet(s) Oral <User Schedule>  docusate sodium 100 milliGRAM(s) Oral two times a day  enalapril 5 milliGRAM(s) Oral daily  enoxaparin Injectable 30 milliGRAM(s) SubCutaneous daily  ertapenem  IVPB 500 milliGRAM(s) IV Intermittent every 24 hours  escitalopram 10 milliGRAM(s) Oral daily  furosemide   Injectable 40 milliGRAM(s) IV Push daily  lactobacillus acidophilus 1 Tablet(s) Oral three times a day with meals  levothyroxine 100 MICROGram(s) Oral daily  methylPREDNISolone sodium succinate Injectable 20 milliGRAM(s) IV Push every 8 hours  sodium chloride 0.65% Nasal 1 Spray(s) Both Nostrils two times a day      REVIEW OF SYSTEMS:    CONSTITUTIONAL: No fever, weight loss, or fatigue  EYES: No eye pain, visual disturbances, or discharge  ENMT:  No difficulty hearing, tinnitus, vertigo; No sinus or throat pain  NECK: No pain or stiffness  BREASTS: No pain, masses, or nipple discharge  RESPIRATORY: No cough, wheezing, chills or hemoptysis; No shortness of breath  CARDIOVASCULAR: No chest pain, palpitations, dizziness, or leg swelling  GASTROINTESTINAL: No abdominal or epigastric pain. No nausea, vomiting, or hematemesis; No diarrhea or constipation. No melena or hematochezia.  GENITOURINARY: No dysuria, frequency, hematuria, or incontinence  NEUROLOGICAL: No headaches, memory loss, loss of strength, numbness, or tremors  SKIN: No itching, burning, rashes, or lesions   LYMPH NODES: No enlarged glands  ENDOCRINE: No heat or cold intolerance; No hair loss  MUSCULOSKELETAL: No joint pain or swelling; No muscle, back, or extremity pain  PSYCHIATRIC: No depression, anxiety, mood swings, or difficulty sleeping  HEME/LYMPH: No easy bruising, or bleeding gums  ALLERY AND IMMUNOLOGIC: No hives or eczema      Physicial Exam:     Constitutional: NAD, well-groomed, well-developed  HEENT: PERRLA, EOMI, no drainage or redness  Neck: No bruits; no thyromegaly or nodules,  No JVD  Back: Normal spine flexure, No CVA tenderness, No deformity or limitation of movement  Respiratory: Breath Sounds equal & clear to percussion & auscultation, no accessory muscle use  Cardiovascular: Regular rate & rhythm, normal S1, S2; no murmurs, gallops or rubs; no S3, S4  Gastrointestinal: Soft, non-tender, non distended no hepatosplenomegaly, normal bowel sounds  Extremities: No peripheral edema, No cyanosis, clubbing   Vascular: Equal and normal pulses: 2+ peripheral pulses throughout  Neurological: GCS:    A&O x 3; no sensory, motor  deficits, normal reflexes  Psychiatric: Normal mood, normal affect  Musculoskeletal: No joint pain, swelling or deformity; no limitation of movement  Skin: No rashes

## 2018-03-16 NOTE — CONSULT NOTE ADULT - CONSULT REASON
Respiratory distress.  Rule out CHF
hMPV bronchitis and pneumonitis
sob  phipps  dyspnea  resp distress

## 2018-03-16 NOTE — PROGRESS NOTE ADULT - ASSESSMENT
89 yr ol resident of [facility] assisted living facility, with PMH of Accelerated hypertension  Gastric reflux H/O TIA (transient ischemic attack) and stroke  History of mitral valve prolapse  Hypothyroid  Parkinson's disease  was having SOB, and was found to be hypoxic in facility and sent to hospital, In Ed pt is sob, oxygenating well, but markedly congested , altered mental state and had elevated D Dimer had CT angio has pleural effusion left more than rt.  . Unremarkable noncontrast CT scan of the brain.     Likely having CHf acute TTE ordered, cardio consult called, had b/l pleural effusion, pulmonary consult called, mild increase in LFT.  [facility] has many pts with hMPV infection , will try to out viral illness,  pt denies any bowel or urinary complaint, no fever     and was in ed for head trauma,and was treated with abx for uti recently  admitted with acute respiratory distress with b/l pleural effusion with possible chf with H/o HTN with MVP, PE ruled out  has hMPV viraus pneumonia with left interstitia secondary bacterial PNA with severe aortic stenosis with moderate pulmonary HTN with diastolic dysfunction, with ?uti e coli ESBl in urine, will continue levofloxacin, and will have ID consult, swallowing assesement, out of bed,oxygen , sterois as per pulmonary, has soft abdominal distension non tender will have US abd, neuro f up noted, b/l pleural effusion, pulm f up noted 89 yr ol resident of [facility] assisted living facility, with PMH of Accelerated hypertension  Gastric reflux H/O TIA (transient ischemic attack) and stroke  History of mitral valve prolapse  Hypothyroid  Parkinson's disease  was having SOB, and was found to be hypoxic in facility and sent to hospital, In Ed pt is sob, oxygenating well, but markedly congested , altered mental state and had elevated D Dimer had CT angio has pleural effusion left more than rt.  . Unremarkable noncontrast CT scan of the brain.     Likely having CHf acute TTE ordered, cardio consult called, had b/l pleural effusion, pulmonary consult called, mild increase in LFT.  [facility] has many pts with hMPV infection , will try to out viral illness,  pt denies any bowel or urinary complaint, no fever     and was in ed for head trauma,and was treated with abx for uti recently  admitted with acute respiratory distress with b/l pleural effusion with possible chf with H/o HTN with MVP, PE ruled out  has hMPV viraus pneumonia with left interstitia secondary bacterial PNA with severe aortic stenosis with moderate pulmonary HTN with diastolic dysfunction, with ?uti e coli ESBl in urine, will continue levofloxacin, and will have ID consult, swallowing assesement, out of bed,oxygen , sterois as per pulmonary, has soft abdominal distension non tender will have US abd, neuro f up noted, b/l pleural effusion, pulm f up noted  d/w grandson who is ID physician, at [phone]

## 2018-03-16 NOTE — SWALLOW BEDSIDE ASSESSMENT ADULT - SWALLOW EVAL: RECOMMENDED FEEDING/EATING TECHNIQUES
allow for swallow between intakes/maintain upright posture during/after eating for 30 mins/small sips/bites/crush medication (when feasible)/position upright (90 degrees)

## 2018-03-16 NOTE — PHYSICAL THERAPY INITIAL EVALUATION ADULT - ADDITIONAL COMMENTS
pt stated she transfers into wheelchair with assistance pt is total assist transfer to wheelchair, unable to ambulate or stand

## 2018-03-16 NOTE — PROGRESS NOTE ADULT - ASSESSMENT
89 year old female, history of Hypertension, comes in with hypoxia.  Bilateral pleural effusions, chronic. Severe Aortic Stenosis on Echo but not severe on physical exam.

## 2018-03-17 DIAGNOSIS — I35.0 NONRHEUMATIC AORTIC (VALVE) STENOSIS: ICD-10-CM

## 2018-03-17 DIAGNOSIS — F03.91 UNSPECIFIED DEMENTIA WITH BEHAVIORAL DISTURBANCE: ICD-10-CM

## 2018-03-17 LAB
-  AMIKACIN: SIGNIFICANT CHANGE UP
-  AMPICILLIN/SULBACTAM: SIGNIFICANT CHANGE UP
-  AMPICILLIN: SIGNIFICANT CHANGE UP
-  AZTREONAM: SIGNIFICANT CHANGE UP
-  CEFAZOLIN: SIGNIFICANT CHANGE UP
-  CEFEPIME: SIGNIFICANT CHANGE UP
-  CEFOXITIN: SIGNIFICANT CHANGE UP
-  CEFTAZIDIME: SIGNIFICANT CHANGE UP
-  CEFTRIAXONE: SIGNIFICANT CHANGE UP
-  CIPROFLOXACIN: SIGNIFICANT CHANGE UP
-  ERTAPENEM: SIGNIFICANT CHANGE UP
-  GENTAMICIN: SIGNIFICANT CHANGE UP
-  IMIPENEM: SIGNIFICANT CHANGE UP
-  LEVOFLOXACIN: SIGNIFICANT CHANGE UP
-  MEROPENEM: SIGNIFICANT CHANGE UP
-  NITROFURANTOIN: SIGNIFICANT CHANGE UP
-  PIPERACILLIN/TAZOBACTAM: SIGNIFICANT CHANGE UP
-  TOBRAMYCIN: SIGNIFICANT CHANGE UP
-  TRIMETHOPRIM/SULFAMETHOXAZOLE: SIGNIFICANT CHANGE UP
ANION GAP SERPL CALC-SCNC: 8 MMOL/L — SIGNIFICANT CHANGE UP (ref 5–17)
BASOPHILS # BLD AUTO: 0 K/UL — SIGNIFICANT CHANGE UP (ref 0–0.2)
BASOPHILS NFR BLD AUTO: 0.2 % — SIGNIFICANT CHANGE UP (ref 0–2)
BUN SERPL-MCNC: 27 MG/DL — HIGH (ref 7–23)
CALCIUM SERPL-MCNC: 9 MG/DL — SIGNIFICANT CHANGE UP (ref 8.4–10.5)
CHLORIDE SERPL-SCNC: 104 MMOL/L — SIGNIFICANT CHANGE UP (ref 96–108)
CO2 SERPL-SCNC: 31 MMOL/L — SIGNIFICANT CHANGE UP (ref 22–31)
CREAT SERPL-MCNC: 0.93 MG/DL — SIGNIFICANT CHANGE UP (ref 0.5–1.3)
CULTURE RESULTS: SIGNIFICANT CHANGE UP
EOSINOPHIL # BLD AUTO: 0 K/UL — SIGNIFICANT CHANGE UP (ref 0–0.5)
EOSINOPHIL NFR BLD AUTO: 0 % — SIGNIFICANT CHANGE UP (ref 0–6)
GLUCOSE SERPL-MCNC: 144 MG/DL — HIGH (ref 70–99)
HCT VFR BLD CALC: 35.4 % — SIGNIFICANT CHANGE UP (ref 34.5–45)
HGB BLD-MCNC: 12 G/DL — SIGNIFICANT CHANGE UP (ref 11.5–15.5)
LYMPHOCYTES # BLD AUTO: 1.2 K/UL — SIGNIFICANT CHANGE UP (ref 1–3.3)
LYMPHOCYTES # BLD AUTO: 12.5 % — LOW (ref 13–44)
MCHC RBC-ENTMCNC: 33.3 PG — SIGNIFICANT CHANGE UP (ref 27–34)
MCHC RBC-ENTMCNC: 33.8 GM/DL — SIGNIFICANT CHANGE UP (ref 32–36)
MCV RBC AUTO: 98.4 FL — SIGNIFICANT CHANGE UP (ref 80–100)
METHOD TYPE: SIGNIFICANT CHANGE UP
MONOCYTES # BLD AUTO: 0.8 K/UL — SIGNIFICANT CHANGE UP (ref 0–0.9)
MONOCYTES NFR BLD AUTO: 8.6 % — SIGNIFICANT CHANGE UP (ref 2–14)
NEUTROPHILS # BLD AUTO: 7.5 K/UL — HIGH (ref 1.8–7.4)
NEUTROPHILS NFR BLD AUTO: 78.7 % — HIGH (ref 43–77)
ORGANISM # SPEC MICROSCOPIC CNT: SIGNIFICANT CHANGE UP
ORGANISM # SPEC MICROSCOPIC CNT: SIGNIFICANT CHANGE UP
PLATELET # BLD AUTO: 151 K/UL — SIGNIFICANT CHANGE UP (ref 150–400)
POTASSIUM SERPL-MCNC: 4.3 MMOL/L — SIGNIFICANT CHANGE UP (ref 3.5–5.3)
POTASSIUM SERPL-SCNC: 4.3 MMOL/L — SIGNIFICANT CHANGE UP (ref 3.5–5.3)
RBC # BLD: 3.6 M/UL — LOW (ref 3.8–5.2)
RBC # FLD: 15.4 % — HIGH (ref 10.3–14.5)
SODIUM SERPL-SCNC: 143 MMOL/L — SIGNIFICANT CHANGE UP (ref 135–145)
SPECIMEN SOURCE: SIGNIFICANT CHANGE UP
WBC # BLD: 9.5 K/UL — SIGNIFICANT CHANGE UP (ref 3.8–10.5)
WBC # FLD AUTO: 9.5 K/UL — SIGNIFICANT CHANGE UP (ref 3.8–10.5)

## 2018-03-17 RX ORDER — HYDRALAZINE HCL 50 MG
10 TABLET ORAL ONCE
Qty: 0 | Refills: 0 | Status: COMPLETED | OUTPATIENT
Start: 2018-03-17 | End: 2018-03-17

## 2018-03-17 RX ADMIN — Medication 100 MILLIGRAM(S): at 06:16

## 2018-03-17 RX ADMIN — CARBIDOPA AND LEVODOPA 2 TABLET(S): 25; 100 TABLET ORAL at 12:34

## 2018-03-17 RX ADMIN — CARBIDOPA AND LEVODOPA 2 TABLET(S): 25; 100 TABLET ORAL at 21:10

## 2018-03-17 RX ADMIN — CARBIDOPA AND LEVODOPA 2 TABLET(S): 25; 100 TABLET ORAL at 10:34

## 2018-03-17 RX ADMIN — Medication 20 MILLIGRAM(S): at 14:45

## 2018-03-17 RX ADMIN — Medication 40 MILLIGRAM(S): at 05:32

## 2018-03-17 RX ADMIN — CARBIDOPA AND LEVODOPA 2 TABLET(S): 25; 100 TABLET ORAL at 17:48

## 2018-03-17 RX ADMIN — ALBUTEROL 2.5 MILLIGRAM(S): 90 AEROSOL, METERED ORAL at 13:37

## 2018-03-17 RX ADMIN — ERTAPENEM SODIUM 100 MILLIGRAM(S): 1 INJECTION, POWDER, LYOPHILIZED, FOR SOLUTION INTRAMUSCULAR; INTRAVENOUS at 17:48

## 2018-03-17 RX ADMIN — Medication 1 SPRAY(S): at 05:32

## 2018-03-17 RX ADMIN — Medication 1 TABLET(S): at 12:26

## 2018-03-17 RX ADMIN — Medication 81 MILLIGRAM(S): at 12:25

## 2018-03-17 RX ADMIN — ALBUTEROL 2.5 MILLIGRAM(S): 90 AEROSOL, METERED ORAL at 08:06

## 2018-03-17 RX ADMIN — CARBIDOPA AND LEVODOPA 0.5 TABLET(S): 25; 100 TABLET ORAL at 07:04

## 2018-03-17 RX ADMIN — Medication 100 MICROGRAM(S): at 06:17

## 2018-03-17 RX ADMIN — Medication 20 MILLIGRAM(S): at 05:32

## 2018-03-17 RX ADMIN — Medication 1 TABLET(S): at 10:34

## 2018-03-17 RX ADMIN — Medication 100 MILLIGRAM(S): at 17:48

## 2018-03-17 RX ADMIN — ALBUTEROL 2.5 MILLIGRAM(S): 90 AEROSOL, METERED ORAL at 20:52

## 2018-03-17 RX ADMIN — Medication 5 MILLIGRAM(S): at 06:16

## 2018-03-17 RX ADMIN — ESCITALOPRAM OXALATE 10 MILLIGRAM(S): 10 TABLET, FILM COATED ORAL at 12:26

## 2018-03-17 RX ADMIN — Medication 20 MILLIGRAM(S): at 22:08

## 2018-03-17 RX ADMIN — Medication 0.5 MILLIGRAM(S): at 08:06

## 2018-03-17 RX ADMIN — ENOXAPARIN SODIUM 30 MILLIGRAM(S): 100 INJECTION SUBCUTANEOUS at 12:25

## 2018-03-17 RX ADMIN — Medication 1 TABLET(S): at 17:48

## 2018-03-17 RX ADMIN — CARBIDOPA AND LEVODOPA 1 TABLET(S): 25; 100 TABLET ORAL at 22:08

## 2018-03-17 RX ADMIN — Medication 10 MILLIGRAM(S): at 00:22

## 2018-03-17 NOTE — PROGRESS NOTE ADULT - SUBJECTIVE AND OBJECTIVE BOX
GRIFFIN JESUS is a 89yFemale , patient examined and chart reviewed.     INTERVAL HPI/ OVERNIGHT EVENTS:  Resting comfortably. Afebrile.    Past Medical History--  PAST MEDICAL & SURGICAL HISTORY:  History of mitral valve prolapse  Gastric reflux  H/O TIA (transient ischemic attack) and stroke  Hypothyroid  Hypothyroidism, acquired  Parkinson's disease  Accelerated hypertension  History of tonsillectomy  History of cataract surgery  Hx of lumpectomy  History of appendectomy  History of ovarian cystectomy      For details regarding the patient's social history, family history, and other miscellaneous elements, please refer the initial infectious diseases consultation and/or the admitting history and physical examination for this admission.      ROS:  Unable to obtain due to : Dementia    ALLERGIES:  beta blockers (Hives)  penicillin (Unknown)      Current inpatient medications :    ANTIBIOTICS/RELEVANT:  ertapenem  IVPB 500 milliGRAM(s) IV Intermittent every 24 hours  lactobacillus acidophilus 1 Tablet(s) Oral three times a day with meals      ALBUTerol    0.083% 2.5 milliGRAM(s) Nebulizer every 6 hours  aspirin enteric coated 81 milliGRAM(s) Oral daily  buDESOnide   0.5 milliGRAM(s) Respule 0.5 milliGRAM(s) Inhalation two times a day  carbidopa/levodopa  25/100 2 Tablet(s) Oral <User Schedule>  carbidopa/levodopa CR 50/200 1 Tablet(s) Oral <User Schedule>  carbidopa/levodopa CR 50/200 0.5 Tablet(s) Oral <User Schedule>  docusate sodium 100 milliGRAM(s) Oral two times a day  enalapril 5 milliGRAM(s) Oral daily  enoxaparin Injectable 30 milliGRAM(s) SubCutaneous daily  escitalopram 10 milliGRAM(s) Oral daily  furosemide   Injectable 40 milliGRAM(s) IV Push daily  levothyroxine 100 MICROGram(s) Oral daily  methylPREDNISolone sodium succinate Injectable 20 milliGRAM(s) IV Push every 8 hours  polyethylene glycol 3350 17 Gram(s) Oral daily PRN  sodium chloride 0.65% Nasal 1 Spray(s) Both Nostrils two times a day      Objective:    03-16 @ 07:01  -  03-17 @ 07:00  --------------------------------------------------------  IN: 240 mL / OUT: 0 mL / NET: 240 mL    03-17 @ 07:01  -  03-17 @ 16:08  --------------------------------------------------------  IN: 325 mL / OUT: 3 mL / NET: 322 mL      T(C): 36.4 (03-17-18 @ 12:24), Max: 36.7 (03-17-18 @ 04:07)  HR: 80 (03-17-18 @ 16:00) (75 - 100)  BP: 123/78 (03-17-18 @ 16:00) (91/48 - 190/71)  RR: 18 (03-17-18 @ 16:00) (16 - 26)  SpO2: 92% (03-17-18 @ 16:00) (92% - 100%)  Wt(kg): --      Physical Exam:  GEN: NAD,   HEENT: normocephalic and atraumatic. EOMI. BEATRICE. Moist mucosa. Clear Posterior pharynx.  NECK: Supple. No carotid bruits.  No lymphadenopathy or thyromegaly.  LUNGS: Decreased to auscultation.  HEART: Regular rate and rhythm without murmur.  ABDOMEN: Soft, nontender, and nondistended.  Positive bowel sounds.  No hepatosplenomegaly was noted.  EXTREMITIES: Without any cyanosis, clubbing, rash, lesions or edema.  NEUROLOGIC: Dementia  SKIN: No ulceration or induration present.      LABS:                        12.0   9.5   )-----------( 151      ( 17 Mar 2018 06:53 )             35.4       03-17    143  |  104  |  27<H>  ----------------------------<  144<H>  4.3   |  31  |  0.93    Ca    9.0      17 Mar 2018 06:53  Mg     2.2     03-16    TPro  6.5  /  Alb  2.3<L>  /  TBili  0.4  /  DBili  x   /  AST  20  /  ALT  <10<L>  /  AlkPhos  182<H>  03-16      MICROBIOLOGY:    Culture - Urine (collected 15 Mar 2018 21:40)  Source: .Urine Catheterized  Preliminary Report (16 Mar 2018 17:45):    >100,000 CFU/ml Escherichia coli    Culture - Blood (collected 15 Mar 2018 21:36)  Source: .Blood Blood  Preliminary Report (16 Mar 2018 22:01):    No growth to date.    Culture - Blood (collected 15 Mar 2018 21:36)  Source: .Blood Blood  Preliminary Report (16 Mar 2018 22:01):    No growth to date.    RADIOLOGY & ADDITIONAL STUDIES:  PROCEDURE DATE:  03/15/2018      INTERPRETATION:  CT angiogram chest with contrast    History shortness of breath    Contrast Omnipaque 90 cc; 10 cc discarded    Axial MIPS included    There are bilateral pleural effusions, slightly larger on the left   occupying roughly 30% hemithorax. There is no pulmonary embolism. There   is no pericardial effusion. There is partial consolidation of the left   lower lobe without air bronchograms.  There is mild interstitial opacity   in the nondependent portions of both lower lobes and to a lesser degree   the upper lobes.The aorta is normal in caliber and enhances uniformly.   There is extensive thoracic ankylosis with kyphoscoliosis. Incidental   note is made of multiple small calcified stones in the otherwise normal   gallbladder.    IMPRESSION:    Negative for pulmonary embolism. Bilateral pleural effusions with   associated basilar atelectasis and interstitial pneumonia or pneumonitis    Assessment :   89 YO F resident of [facility] living facility PMH of HTN CVA MVP Parkinson's   disease admitted with SOB sec hMPV bronchitis with pneumonitis  Rasheed pleural effusions  CHF  UTI  hx ESBL  Orophyrangeal dysphagia    Plan :   Cont Ertepenam day 2  Fu final cultures  Aspiration precautions    Continue with present regiment.  Appropriate use of antibiotics and adverse effects reviewed.      Critical care time greater then 35 minutes reviewing notes, labs data/ imaging , discussion with multidisciplinary team.    Thank you for allowing me to participate in care of your patient .        Santa Chavez MD  Infectious Disease  [phone]

## 2018-03-17 NOTE — PROGRESS NOTE ADULT - ASSESSMENT
89 yr ol resident of [facility] assisted living facility, with PMH of Accelerated hypertension  Gastric reflux H/O TIA (transient ischemic attack) and stroke  History of mitral valve prolapse  Hypothyroid  Parkinson's disease  was having SOB, and was found to be hypoxic in facility and sent to hospital, In Ed pt is sob, oxygenating well, but markedly congested , altered mental state and had elevated D Dimer had CT angio has pleural effusion left more than rt.  . Unremarkable noncontrast CT scan of the brain.     Likely having CHf acute TTE ordered, cardio consult called, had b/l pleural effusion, pulmonary consult called, mild increase in LFT.  [facility] has many pts with hMPV infection , will try to out viral illness,  pt denies any bowel or urinary complaint, no fever     and was in ed for head trauma,and was treated with abx for uti recently  admitted with acute respiratory distress with b/l pleural effusion with possible chf with H/o HTN with MVP, PE ruled out  has hMPV viraus pneumonia with left interstitia secondary bacterial PNA with severe aortic stenosis with moderate pulmonary HTN with diastolic dysfunction, with ?uti e coli ESBl in urine, will continue levofloxacin, and will have ID consult, swallowing assesement, out of bed,oxygen , sterois as per pulmonary, has soft abdominal distension non tender will have US abd, neuro f up noted, b/l pleural effusion, pulm f up noted  d/w grandson who is ID physician, at [phone]  d/w with son ROberttoday- pt off oxygen, abx changed to ertapanem for ESBL uti, orophryngeal dysphagia on puree diet with thikned liquids  increase activity PT ruben  had mild agitaion at night required haldol, calm at present

## 2018-03-17 NOTE — PROGRESS NOTE ADULT - SUBJECTIVE AND OBJECTIVE BOX
Patient is a 89y old  Female who presents with a chief complaint of sob, Hypoxia (15 Mar 2018 15:43)      INTERVAL HPI/OVERNIGHT EVENTS:no acute event overnight    Home Medications:  acetaminophen-diphenhydramine 500 mg-25 mg oral capsule:  (03 Dec 2017 18:30)  aspirin 81 mg oral delayed release capsule:  orally once a day (03 Dec 2017 18:30)  carbidopa-levodopa 25 mg-100 mg oral tablet: 2  orally 4 times a day (03 Dec 2017 18:30)  carbidopa-levodopa 50 mg-200 mg oral tablet, extended release: 1 tab(s) orally every 6 hours (03 Dec 2017 18:30)  Centrum oral tablet, chewable: 1 tab(s) orally once a day (03 Dec 2017 18:30)  Colace 100 mg oral capsule:  orally 3 times a day (03 Dec 2017 18:00)  docusate potassium 100 mg oral capsule:  (03 Dec 2017 18:30)  enalapril 5 mg oral tablet: 1 tab(s) orally once a day (03 Dec 2017 18:30)  ergocalciferol 50,000 intl units (1.25 mg) oral capsule: 1 cap(s) orally every other week (03 Dec 2017 18:30)  Flonase 50 mcg/inh nasal spray: 1 spray(s) nasal once a day (03 Dec 2017 18:30)  levothyroxine 100 mcg (0.1 mg) oral tablet: 1 tab(s) orally once a day (03 Dec 2017 18:30)  Lexapro 10 mg oral tablet: 1 tab(s) orally once a day (03 Dec 2017 18:30)  loratadine 10 mg oral tablet: 1 tab(s) orally once a day (03 Dec 2017 18:30)  Milk of Magnesia:  (03 Dec 2017 18:30)  MiraLax oral powder for reconstitution:  (03 Dec 2017 18:30)  Ocuvite Lutein oral capsule: 1 cap(s) orally 2 times a day (03 Dec 2017 18:00)  omeprazole 20 mg oral delayed release capsule:  orally 2 times a day (03 Dec 2017 18:00)  polyethylene glycol 3350 oral powder for reconstitution:  orally every 3 days (03 Dec 2017 18:00)  senna 8.6 mg oral tablet: 2  orally once a day (at bedtime) (03 Dec 2017 18:30)  Senna 8.6 mg oral tablet: 1 tab(s) orally once a day (at bedtime) (03 Dec 2017 18:30)  Systane ophthalmic solution: 1 drop(s) to each affected eye 2 times a day (03 Dec 2017 18:00)  Systane ophthalmic solution: 1 drop(s) to each affected eye 2 times a day (03 Dec 2017 18:30)  zinc oxide topical ointment:  (03 Dec 2017 18:30)      MEDICATIONS  (STANDING):  ALBUTerol    0.083% 2.5 milliGRAM(s) Nebulizer every 6 hours  aspirin enteric coated 81 milliGRAM(s) Oral daily  buDESOnide   0.5 milliGRAM(s) Respule 0.5 milliGRAM(s) Inhalation two times a day  carbidopa/levodopa  25/100 2 Tablet(s) Oral <User Schedule>  carbidopa/levodopa CR 50/200 1 Tablet(s) Oral <User Schedule>  carbidopa/levodopa CR 50/200 0.5 Tablet(s) Oral <User Schedule>  docusate sodium 100 milliGRAM(s) Oral two times a day  enalapril 5 milliGRAM(s) Oral daily  enoxaparin Injectable 30 milliGRAM(s) SubCutaneous daily  ertapenem  IVPB 500 milliGRAM(s) IV Intermittent every 24 hours  escitalopram 10 milliGRAM(s) Oral daily  furosemide   Injectable 40 milliGRAM(s) IV Push daily  lactobacillus acidophilus 1 Tablet(s) Oral three times a day with meals  levothyroxine 100 MICROGram(s) Oral daily  methylPREDNISolone sodium succinate Injectable 20 milliGRAM(s) IV Push every 8 hours  sodium chloride 0.65% Nasal 1 Spray(s) Both Nostrils two times a day    MEDICATIONS  (PRN):  polyethylene glycol 3350 17 Gram(s) Oral daily PRN Constipation      Allergies    beta blockers (Hives)  penicillin (Unknown)    Intolerances        REVIEW OF SYSTEMS:  unable as pt has poor memory and is lethargic, but better than in admission  Vital Signs Last 24 Hrs  T(C): 36.7 (17 Mar 2018 04:07), Max: 36.8 (16 Mar 2018 16:00)  T(F): 98.1 (17 Mar 2018 04:07), Max: 98.3 (16 Mar 2018 16:00)  HR: 100 (17 Mar 2018 08:06) (64 - 100)  BP: 135/69 (17 Mar 2018 08:00) (118/69 - 190/71)  BP(mean): 90 (17 Mar 2018 08:00) (85 - 109)  RR: 17 (17 Mar 2018 08:00) (15 - 26)  SpO2: 96% (17 Mar 2018 08:06) (92% - 100%)    PHYSICAL EXAM:  GENERAL: NAD, well-groomed, well-developed  HEAD:  Atraumatic, Normocephalic  EYES: PERRLA, conjunctiva and sclera clear  ENMT: Moist mucous membranes,   NECK: Supple, No JVD, Normal thyroid  NERVOUS SYSTEM: oriented to self only; Motor Strength 3/5 B/L upper and lower extremities;   CHEST/LUNG: B/L ronchi, BS decreased at bases  HEART: Regular rate and rhythm; No murmurs, rubs, or gallops  ABDOMEN: Soft, Nontender, Nondistended; Bowel sounds present  EXTREMITIES:  2+ Peripheral Pulses, No clubbing, cyanosis, or edema  LYMPH: No lymphadenopathy noted  SKIN: No rashes or lesions    LABS:                        12.0   9.5   )-----------( 151      ( 17 Mar 2018 06:53 )             35.4     03-17    143  |  104  |  27<H>  ----------------------------<  144<H>  4.3   |  31  |  0.93    Ca    9.0      17 Mar 2018 06:53  Mg     2.2     03-16    TPro  6.5  /  Alb  2.3<L>  /  TBili  0.4  /  DBili  x   /  AST  20  /  ALT  <10<L>  /  AlkPhos  182<H>  03-16    PT/INR - ( 15 Mar 2018 13:33 )   PT: 11.1 sec;   INR: 1.02 ratio         PTT - ( 15 Mar 2018 13:33 )  PTT:34.3 sec  Urinalysis Basic - ( 15 Mar 2018 15:59 )    Color: Yellow / Appearance: Turbid / S.010 / pH: x  Gluc: x / Ketone: Trace  / Bili: Negative / Urobili: 1 mg/dL   Blood: x / Protein: 30 mg/dL / Nitrite: Negative   Leuk Esterase: Moderate / RBC: 11-25 /HPF / WBC >50   Sq Epi: x / Non Sq Epi: Few / Bacteria: Many      CAPILLARY BLOOD GLUCOSE            Culture - Urine (collected 03-15-18 @ 21:40)  Source: .Urine Catheterized  Preliminary Report (18 @ 17:45):    >100,000 CFU/ml Escherichia coli    Culture - Blood (collected 03-15-18 @ 21:36)  Source: .Blood Blood  Preliminary Report (18 @ 22:01):    No growth to date.    Culture - Blood (collected 03-15-18 @ 21:36)  Source: .Blood Blood  Preliminary Report (18 @ 22:01):    No growth to date.        I&O's Summary    16 Mar 2018 07:01  -  17 Mar 2018 07:00  --------------------------------------------------------  IN: 240 mL / OUT: 0 mL / NET: 240 mL        RADIOLOGY & ADDITIONAL TESTS:    Imaging Personally Reviewed:  [x ] YES  [ ] NO    Consultant(s) Notes Reviewed:  [x ] YES  [ ] NO    Care Discussed with Consultants/Other Providers [x ] YES  [ ] NO

## 2018-03-17 NOTE — PROGRESS NOTE ADULT - SUBJECTIVE AND OBJECTIVE BOX
Neurology follow up note    JESUS [id number]      Interval History:    Patient resting in bed events last noted     MEDICATIONS    ALBUTerol    0.083% 2.5 milliGRAM(s) Nebulizer every 6 hours  aspirin enteric coated 81 milliGRAM(s) Oral daily  buDESOnide   0.5 milliGRAM(s) Respule 0.5 milliGRAM(s) Inhalation two times a day  carbidopa/levodopa  25/100 2 Tablet(s) Oral <User Schedule>  carbidopa/levodopa CR 50/200 1 Tablet(s) Oral <User Schedule>  carbidopa/levodopa CR 50/200 0.5 Tablet(s) Oral <User Schedule>  docusate sodium 100 milliGRAM(s) Oral two times a day  enalapril 5 milliGRAM(s) Oral daily  enoxaparin Injectable 30 milliGRAM(s) SubCutaneous daily  ertapenem  IVPB 500 milliGRAM(s) IV Intermittent every 24 hours  escitalopram 10 milliGRAM(s) Oral daily  furosemide   Injectable 40 milliGRAM(s) IV Push daily  lactobacillus acidophilus 1 Tablet(s) Oral three times a day with meals  levothyroxine 100 MICROGram(s) Oral daily  methylPREDNISolone sodium succinate Injectable 20 milliGRAM(s) IV Push every 8 hours  polyethylene glycol 3350 17 Gram(s) Oral daily PRN  sodium chloride 0.65% Nasal 1 Spray(s) Both Nostrils two times a day      Allergies    beta blockers (Hives)  penicillin (Unknown)    Intolerances            Vital Signs Last 24 Hrs  T(C): 36.7 (17 Mar 2018 04:07), Max: 36.8 (16 Mar 2018 16:00)  T(F): 98.1 (17 Mar 2018 04:07), Max: 98.3 (16 Mar 2018 16:00)  HR: 100 (17 Mar 2018 08:06) (64 - 100)  BP: 135/69 (17 Mar 2018 08:00) (118/69 - 190/71)  BP(mean): 90 (17 Mar 2018 08:00) (85 - 127)  RR: 17 (17 Mar 2018 08:00) (14 - 26)  SpO2: 96% (17 Mar 2018 08:06) (92% - 100%)    REVIEW OF SYSTEMS:  Limit or unable to obtain secondary to patient's poor mental status.        On Neurological Examination: The patient is arousable to verbal stimuli, open eyes.      Extraocular movements were intact.     Pupils were equal, round, and reactive bilaterally, 3 mm to 2.      Speech says few word   It was difficult to evaluate for nasolabial folds.      Motor, bilateral upper were 3/5, bilateral lower were in flex position.      positive tremors in ext     Increased tone in all four extremities.    Does not follow commands      GENERAL Exam: Nontoxic , No Acute Distress   	  HEENT:  normocephalic, atraumatic  		  LUNGS:  Decreased bilaterally  	  HEART: Normal S1S2   No murmur RRR        	  GI/ ABDOMEN:  Soft  Non tender    EXTREMITIES:   No Edema  No Clubbing  No Cyanosis No Edema    MUSCULOSKELETAL: decreased Range of Motion all 4 extremities   	   SKIN: Normal  No Ecchymosis               LABS:  CBC Full  -  ( 17 Mar 2018 06:53 )  WBC Count : 9.5 K/uL  Hemoglobin : 12.0 g/dL  Hematocrit : 35.4 %  Platelet Count - Automated : 151 K/uL  Mean Cell Volume : 98.4 fl  Mean Cell Hemoglobin : 33.3 pg  Mean Cell Hemoglobin Concentration : 33.8 gm/dL  Auto Neutrophil # : 7.5 K/uL  Auto Lymphocyte # : 1.2 K/uL  Auto Monocyte # : 0.8 K/uL  Auto Eosinophil # : 0.0 K/uL  Auto Basophil # : 0.0 K/uL  Auto Neutrophil % : 78.7 %  Auto Lymphocyte % : 12.5 %  Auto Monocyte % : 08.6 %  Auto Eosinophil % : 0.0 %  Auto Basophil % : 0.2 %    Urinalysis Basic - ( 15 Mar 2018 15:59 )    Color: Yellow / Appearance: Turbid / S.010 / pH: x  Gluc: x / Ketone: Trace  / Bili: Negative / Urobili: 1 mg/dL   Blood: x / Protein: 30 mg/dL / Nitrite: Negative   Leuk Esterase: Moderate / RBC: 11-25 /HPF / WBC >50   Sq Epi: x / Non Sq Epi: Few / Bacteria: Many      03-17    143  |  104  |  27<H>  ----------------------------<  144<H>  4.3   |  31  |  0.93    Ca    9.0      17 Mar 2018 06:53  Mg     2.2     03-16    TPro  6.5  /  Alb  2.3<L>  /  TBili  0.4  /  DBili  x   /  AST  20  /  ALT  <10<L>  /  AlkPhos  182<H>  03-16    Hemoglobin A1C:     LIVER FUNCTIONS - ( 16 Mar 2018 07:18 )  Alb: 2.3 g/dL / Pro: 6.5 g/dL / ALK PHOS: 182 U/L / ALT: <10 U/L DA / AST: 20 U/L / GGT: x           Vitamin B12   PT/INR - ( 15 Mar 2018 13:33 )   PT: 11.1 sec;   INR: 1.02 ratio         PTT - ( 15 Mar 2018 13:33 )  PTT:34.3 sec      RADIOLOGY    ANALYSIS AND PLAN:  A 89-year-old episode with change in mental status in regards to lethargy, history of Parkinson's disease, hypothyroidism.  1.	For change in mental status and lethargy most likely secondary to metabolic etiologies, respiratory issues.  2.	I would recommend monitor oxygen saturation, BiPAP as needed.  3.	For history of Parkinson's.  I will continue the patient on her Sinemet 25/100 two tablets at 9 a.m., 1 p.m., 5 p.m., 9 p.m.; and Sinemet extended release 200/50 half tablet 07:30 a.m. and one tablet at 10 p.m.  4.	For history of hypothyroidism, continue the patient on her Synthroid.  5.	The patient prognosis at present is guarded.  6.	if possible please avoid HALDOL try low dose zyprexa 2.5 if needed   7.	left message for son rocael  today    Thank you for the courtesy of this consultation.    Physical therapy evaluation as tolerated  OOB to chair/ambulation with assistance only if possible.    Greater than 45 minutes spent in direct patient care reviewing  the notes, lab data/ imaging , discussion with multidisciplinary team.

## 2018-03-17 NOTE — PROGRESS NOTE ADULT - SUBJECTIVE AND OBJECTIVE BOX
Patient is a 89y Female with a known history of :  Aortic valve stenosis, etiology of cardiac valve disease unspecified (I35.0)  Dementia with behavioral disturbance, unspecified dementia type (F03.91)  Agitation (R45.1)  Aortic stenosis (I35.0)  Human metapneumovirus (hMPV) pneumonia (J12.3)  Hypothyroidism, unspecified type (E03.9)  Parkinson's disease (G20)  Essential hypertension (I10)  Pleural effusion (J90)  Respiratory distress (R06.03)    HPI:  89 yr ol resident of [facility] assisted living [facility], with PMH of Accelerated hypertension  Gastric reflux H/O TIA (transient ischemic attack) and stroke  History of mitral valve prolapse  Hypothyroid  Parkinson's disease  was having SOB, and was found to be hypoxic in facility and sent to hospital, In Ed pt is sob, oxygenating well, but markedly congested , altered mental state and had elevated D Dimer had CT angio has pleural effusion left more than rt.  . Unremarkable noncontrast CT scan of the brain.     Likely having CHf acute TTE ordered, cardio consult called, had b/l pleural effusion, pulmonary consult called, mild increase in LFT.  [facility] has many pts with hMPV infection , will try to out viral illness,  pt denies any bowel or urinary complaint, no fever (15 Mar 2018 15:43)      MEDICATIONS  (STANDING):  ALBUTerol    0.083% 2.5 milliGRAM(s) Nebulizer every 6 hours  aspirin enteric coated 81 milliGRAM(s) Oral daily  buDESOnide   0.5 milliGRAM(s) Respule 0.5 milliGRAM(s) Inhalation two times a day  carbidopa/levodopa  25/100 2 Tablet(s) Oral <User Schedule>  carbidopa/levodopa CR 50/200 1 Tablet(s) Oral <User Schedule>  carbidopa/levodopa CR 50/200 0.5 Tablet(s) Oral <User Schedule>  docusate sodium 100 milliGRAM(s) Oral two times a day  enalapril 5 milliGRAM(s) Oral daily  enoxaparin Injectable 30 milliGRAM(s) SubCutaneous daily  ertapenem  IVPB 500 milliGRAM(s) IV Intermittent every 24 hours  escitalopram 10 milliGRAM(s) Oral daily  furosemide   Injectable 40 milliGRAM(s) IV Push daily  lactobacillus acidophilus 1 Tablet(s) Oral three times a day with meals  levothyroxine 100 MICROGram(s) Oral daily  methylPREDNISolone sodium succinate Injectable 20 milliGRAM(s) IV Push every 8 hours  sodium chloride 0.65% Nasal 1 Spray(s) Both Nostrils two times a day    MEDICATIONS  (PRN):  polyethylene glycol 3350 17 Gram(s) Oral daily PRN Constipation      ALLERGIES: beta blockers (Hives)  penicillin (Unknown)      FAMILY HISTORY:      Social history:  Alochol:   Smoking:   Drug Use:   Marital Status:     PHYSICAL EXAMINATION:  -----------------------------  T(C): 36.6 (03-17-18 @ 10:35), Max: 36.8 (03-16-18 @ 16:00)  HR: 84 (03-17-18 @ 10:00) (64 - 100)  BP: 121/57 (03-17-18 @ 10:00) (118/69 - 190/71)  RR: 17 (03-17-18 @ 10:00) (15 - 26)  SpO2: 93% (03-17-18 @ 10:00) (92% - 100%)  Wt(kg): --    03-16 @ 07:01  -  03-17 @ 07:00  --------------------------------------------------------  IN:    IV PiggyBack: 100 mL    Oral Fluid: 140 mL  Total IN: 240 mL    OUT:  Total OUT: 0 mL    Total NET: 240 mL      03-17 @ 07:01  -  03-17 @ 11:23  --------------------------------------------------------  IN:    Oral Fluid: 125 mL  Total IN: 125 mL    OUT:    Voided: 1 mL  Total OUT: 1 mL    Total NET: 124 mL            Constitutional: asleep sitting up in bed    Neck: normal jugular venous A waves present, normal jugular venous V waves present and no jugular venous alonzo A waves.   Pulmonary: no respiratory distress, normal respiratory rhythm and effort, no accessory muscle use and lungs were clear to auscultation bilaterally.   Cardiovascular: heart rate and rhythm were normal, normal S1 and S2 distant   Musculoskeletal: the gait could not be assessed..   Extremities: no clubbing of the fingernails, no localized cyanosis, no petechial hemorrhages and no ischemic changes.     LABS:   --------  CBC Full  -  ( 17 Mar 2018 06:53 )  WBC Count : 9.5 K/uL  Hemoglobin : 12.0 g/dL  Hematocrit : 35.4 %  Platelet Count - Automated : 151 K/uL  Mean Cell Volume : 98.4 fl  Mean Cell Hemoglobin : 33.3 pg  Mean Cell Hemoglobin Concentration : 33.8 gm/dL  Auto Neutrophil # : 7.5 K/uL  Auto Lymphocyte # : 1.2 K/uL  Auto Monocyte # : 0.8 K/uL  Auto Eosinophil # : 0.0 K/uL  Auto Basophil # : 0.0 K/uL  Auto Neutrophil % : 78.7 %  Auto Lymphocyte % : 12.5 %  Auto Monocyte % : 08.6 %  Auto Eosinophil % : 0.0 %  Auto Basophil % : 0.2 %      03-17    143  |  104  |  27<H>  ----------------------------<  144<H>  4.3   |  31  |  0.93    Ca    9.0      17 Mar 2018 06:53  Mg     2.2     03-16    TPro  6.5  /  Alb  2.3<L>  /  TBili  0.4  /  DBili  x   /  AST  20  /  ALT  <10<L>  /  AlkPhos  182<H>  03-16       PT/INR - ( 15 Mar 2018 13:33 )   PT: 11.1 sec;   INR: 1.02 ratio         PTT - ( 15 Mar 2018 13:33 )  PTT:34.3 sec    03-15 @ 13:33 CPK total:--, CKMB --, Troponin I - .008 ng/mL<L>      Culture Results:   >100,000 CFU/ml Escherichia coli (03-15 @ 21:40)  Culture Results:   No growth to date. (03-15 @ 21:36)  Culture Results:   No growth to date. (03-15 @ 21:36)    03-15 @ 21:40    Organism --   Gram Stain Blood -- Gram Stain --  Specimen Source .Urine Catheterized  Culture-Blood --    03-15 @ 21:36    Organism --   Gram Stain Blood -- Gram Stain --  Specimen Source .Blood Blood  Culture-Blood --    3/17/2018:  In ICU  Tele= NSR a lot of artifact  Sleping    Radiology:

## 2018-03-17 NOTE — PROGRESS NOTE ADULT - SUBJECTIVE AND OBJECTIVE BOX
Date/Time Patient Seen:  		  Referring MD:   Data Reviewed	       Patient is a 89y old  Female who presents with a chief complaint of sob, Hypoxia (15 Mar 2018 15:43)  in bed  seen and examined  vs and meds reviewed  on o2 and BIPAP PRN      Subjective/HPI     PAST MEDICAL & SURGICAL HISTORY:  History of mitral valve prolapse  Gastric reflux  H/O TIA (transient ischemic attack) and stroke  Hypothyroid  Hypothyroidism, acquired  Parkinson's disease  Accelerated hypertension  History of tonsillectomy  History of cataract surgery  Hx of lumpectomy  History of appendectomy  History of ovarian cystectomy        Medication list         MEDICATIONS  (STANDING):  ALBUTerol    0.083% 2.5 milliGRAM(s) Nebulizer every 6 hours  aspirin enteric coated 81 milliGRAM(s) Oral daily  buDESOnide   0.5 milliGRAM(s) Respule 0.5 milliGRAM(s) Inhalation two times a day  carbidopa/levodopa  25/100 2 Tablet(s) Oral <User Schedule>  carbidopa/levodopa CR 50/200 1 Tablet(s) Oral <User Schedule>  carbidopa/levodopa CR 50/200 0.5 Tablet(s) Oral <User Schedule>  docusate sodium 100 milliGRAM(s) Oral two times a day  enalapril 5 milliGRAM(s) Oral daily  enoxaparin Injectable 30 milliGRAM(s) SubCutaneous daily  ertapenem  IVPB 500 milliGRAM(s) IV Intermittent every 24 hours  escitalopram 10 milliGRAM(s) Oral daily  furosemide   Injectable 40 milliGRAM(s) IV Push daily  lactobacillus acidophilus 1 Tablet(s) Oral three times a day with meals  levothyroxine 100 MICROGram(s) Oral daily  methylPREDNISolone sodium succinate Injectable 20 milliGRAM(s) IV Push every 8 hours  sodium chloride 0.65% Nasal 1 Spray(s) Both Nostrils two times a day    MEDICATIONS  (PRN):  polyethylene glycol 3350 17 Gram(s) Oral daily PRN Constipation         Vitals log        ICU Vital Signs Last 24 Hrs  T(C): 36.7 (17 Mar 2018 04:07), Max: 37.2 (16 Mar 2018 08:13)  T(F): 98.1 (17 Mar 2018 04:07), Max: 98.9 (16 Mar 2018 08:13)  HR: 89 (17 Mar 2018 06:00) (64 - 92)  BP: 153/74 (17 Mar 2018 06:00) (118/69 - 190/71)  BP(mean): 99 (17 Mar 2018 06:00) (85 - 127)  ABP: --  ABP(mean): --  RR: 26 (17 Mar 2018 06:00) (13 - 26)  SpO2: 95% (17 Mar 2018 06:00) (92% - 99%)           Input and Output:  I&O's Detail    16 Mar 2018 07:01  -  17 Mar 2018 07:00  --------------------------------------------------------  IN:    IV PiggyBack: 100 mL    Oral Fluid: 140 mL  Total IN: 240 mL    OUT:  Total OUT: 0 mL    Total NET: 240 mL          Lab Data                        12.0   9.5   )-----------( 151      ( 17 Mar 2018 06:53 )             35.4     03-16    142  |  104  |  20  ----------------------------<  82  3.4<L>   |  32<H>  |  0.75    Ca    8.1<L>      16 Mar 2018 07:18  Mg     2.2     03-16    TPro  6.5  /  Alb  2.3<L>  /  TBili  0.4  /  DBili  x   /  AST  20  /  ALT  <10<L>  /  AlkPhos  182<H>  03-16      CARDIAC MARKERS ( 15 Mar 2018 13:33 )  .008 ng/mL / x     / 33 U/L / x     / 0.7 ng/mL        Review of Systems	      Objective     Physical Examination    frail  weak  lung dec BS  abd soft      Pertinent Lab findings & Imaging      Radha:  NO   Adequate UO     I&O's Detail    16 Mar 2018 07:01  -  17 Mar 2018 07:00  --------------------------------------------------------  IN:    IV PiggyBack: 100 mL    Oral Fluid: 140 mL  Total IN: 240 mL    OUT:  Total OUT: 0 mL    Total NET: 240 mL               Discussed with:     Cultures:	        Radiology

## 2018-03-18 DIAGNOSIS — I50.32 CHRONIC DIASTOLIC (CONGESTIVE) HEART FAILURE: ICD-10-CM

## 2018-03-18 LAB
ANION GAP SERPL CALC-SCNC: 6 MMOL/L — SIGNIFICANT CHANGE UP (ref 5–17)
BUN SERPL-MCNC: 36 MG/DL — HIGH (ref 7–23)
CALCIUM SERPL-MCNC: 9.1 MG/DL — SIGNIFICANT CHANGE UP (ref 8.4–10.5)
CHLORIDE SERPL-SCNC: 106 MMOL/L — SIGNIFICANT CHANGE UP (ref 96–108)
CO2 SERPL-SCNC: 35 MMOL/L — HIGH (ref 22–31)
CREAT SERPL-MCNC: 0.96 MG/DL — SIGNIFICANT CHANGE UP (ref 0.5–1.3)
GLUCOSE SERPL-MCNC: 135 MG/DL — HIGH (ref 70–99)
HCT VFR BLD CALC: 35.9 % — SIGNIFICANT CHANGE UP (ref 34.5–45)
HGB BLD-MCNC: 11.4 G/DL — LOW (ref 11.5–15.5)
MCHC RBC-ENTMCNC: 31.2 PG — SIGNIFICANT CHANGE UP (ref 27–34)
MCHC RBC-ENTMCNC: 31.8 GM/DL — LOW (ref 32–36)
MCV RBC AUTO: 98.1 FL — SIGNIFICANT CHANGE UP (ref 80–100)
PLATELET # BLD AUTO: 153 K/UL — SIGNIFICANT CHANGE UP (ref 150–400)
POTASSIUM SERPL-MCNC: 3.7 MMOL/L — SIGNIFICANT CHANGE UP (ref 3.5–5.3)
POTASSIUM SERPL-SCNC: 3.7 MMOL/L — SIGNIFICANT CHANGE UP (ref 3.5–5.3)
RBC # BLD: 3.66 M/UL — LOW (ref 3.8–5.2)
RBC # FLD: 15.2 % — HIGH (ref 10.3–14.5)
SODIUM SERPL-SCNC: 147 MMOL/L — HIGH (ref 135–145)
WBC # BLD: 11.3 K/UL — HIGH (ref 3.8–10.5)
WBC # FLD AUTO: 11.3 K/UL — HIGH (ref 3.8–10.5)

## 2018-03-18 RX ADMIN — CARBIDOPA AND LEVODOPA 2 TABLET(S): 25; 100 TABLET ORAL at 09:03

## 2018-03-18 RX ADMIN — Medication 0.5 MILLIGRAM(S): at 08:04

## 2018-03-18 RX ADMIN — ENOXAPARIN SODIUM 30 MILLIGRAM(S): 100 INJECTION SUBCUTANEOUS at 11:42

## 2018-03-18 RX ADMIN — CARBIDOPA AND LEVODOPA 0.5 TABLET(S): 25; 100 TABLET ORAL at 06:56

## 2018-03-18 RX ADMIN — ERTAPENEM SODIUM 100 MILLIGRAM(S): 1 INJECTION, POWDER, LYOPHILIZED, FOR SOLUTION INTRAMUSCULAR; INTRAVENOUS at 17:20

## 2018-03-18 RX ADMIN — Medication 1 TABLET(S): at 11:42

## 2018-03-18 RX ADMIN — Medication 1 SPRAY(S): at 05:53

## 2018-03-18 RX ADMIN — ESCITALOPRAM OXALATE 10 MILLIGRAM(S): 10 TABLET, FILM COATED ORAL at 11:42

## 2018-03-18 RX ADMIN — Medication 100 MICROGRAM(S): at 05:52

## 2018-03-18 RX ADMIN — Medication 81 MILLIGRAM(S): at 11:42

## 2018-03-18 RX ADMIN — CARBIDOPA AND LEVODOPA 2 TABLET(S): 25; 100 TABLET ORAL at 17:19

## 2018-03-18 RX ADMIN — Medication 100 MILLIGRAM(S): at 17:19

## 2018-03-18 RX ADMIN — Medication 40 MILLIGRAM(S): at 05:52

## 2018-03-18 RX ADMIN — Medication 1 SPRAY(S): at 17:20

## 2018-03-18 RX ADMIN — Medication 1 TABLET(S): at 09:03

## 2018-03-18 RX ADMIN — Medication 100 MILLIGRAM(S): at 05:52

## 2018-03-18 RX ADMIN — CARBIDOPA AND LEVODOPA 2 TABLET(S): 25; 100 TABLET ORAL at 21:07

## 2018-03-18 RX ADMIN — ALBUTEROL 2.5 MILLIGRAM(S): 90 AEROSOL, METERED ORAL at 08:04

## 2018-03-18 RX ADMIN — ALBUTEROL 2.5 MILLIGRAM(S): 90 AEROSOL, METERED ORAL at 20:24

## 2018-03-18 RX ADMIN — Medication 5 MILLIGRAM(S): at 05:52

## 2018-03-18 RX ADMIN — Medication 0.5 MILLIGRAM(S): at 20:24

## 2018-03-18 RX ADMIN — ALBUTEROL 2.5 MILLIGRAM(S): 90 AEROSOL, METERED ORAL at 02:15

## 2018-03-18 RX ADMIN — CARBIDOPA AND LEVODOPA 1 TABLET(S): 25; 100 TABLET ORAL at 22:03

## 2018-03-18 RX ADMIN — CARBIDOPA AND LEVODOPA 2 TABLET(S): 25; 100 TABLET ORAL at 15:00

## 2018-03-18 RX ADMIN — Medication 20 MILLIGRAM(S): at 05:52

## 2018-03-18 RX ADMIN — ALBUTEROL 2.5 MILLIGRAM(S): 90 AEROSOL, METERED ORAL at 13:29

## 2018-03-18 RX ADMIN — Medication 1 TABLET(S): at 17:19

## 2018-03-18 NOTE — PROGRESS NOTE ADULT - SUBJECTIVE AND OBJECTIVE BOX
MOYAJESUS is a 89yFemale , patient examined and chart reviewed.    INTERVAL HPI/ OVERNIGHT EVENTS:  No events. Afebrile.    Past Medical History--  PAST MEDICAL & SURGICAL HISTORY:  History of mitral valve prolapse  Gastric reflux  H/O TIA (transient ischemic attack) and stroke  Hypothyroid  Hypothyroidism, acquired  Parkinson's disease  Accelerated hypertension  History of tonsillectomy  History of cataract surgery  Hx of lumpectomy  History of appendectomy  History of ovarian cystectomy      For details regarding the patient's social history, family history, and other miscellaneous elements, please refer the initial infectious diseases consultation and/or the admitting history and physical examination for this admission.      ROS:  CONSTITUTIONAL:  Negative fever or chills,   EYES:  Negative  blurry vision or double vision  CARDIOVASCULAR:  Negative for chest pain or palpitations  RESPIRATORY:  Negative for cough, wheezing, or SOB   GASTROINTESTINAL:  Negative for nausea, vomiting, diarrhea, constipation, or abdominal pain  GENITOURINARY:  Negative frequency, urgency , dysuria or hematuria   NEUROLOGIC:  No headache, dizziness, lightheadedness +confusion,  All other systems were reviewed and are negative     ALLERGIES  beta blockers (Hives)  penicillin (Unknown)      Current inpatient medications :    ANTIBIOTICS/RELEVANT:  ertapenem  IVPB 500 milliGRAM(s) IV Intermittent every 24 hours  lactobacillus acidophilus 1 Tablet(s) Oral three times a day with meals      ALBUTerol    0.083% 2.5 milliGRAM(s) Nebulizer every 6 hours  aspirin enteric coated 81 milliGRAM(s) Oral daily  buDESOnide   0.5 milliGRAM(s) Respule 0.5 milliGRAM(s) Inhalation two times a day  carbidopa/levodopa  25/100 2 Tablet(s) Oral <User Schedule>  carbidopa/levodopa CR 50/200 1 Tablet(s) Oral <User Schedule>  carbidopa/levodopa CR 50/200 0.5 Tablet(s) Oral <User Schedule>  docusate sodium 100 milliGRAM(s) Oral two times a day  enalapril 5 milliGRAM(s) Oral daily  enoxaparin Injectable 30 milliGRAM(s) SubCutaneous daily  escitalopram 10 milliGRAM(s) Oral daily  levothyroxine 100 MICROGram(s) Oral daily  polyethylene glycol 3350 17 Gram(s) Oral daily PRN  sodium chloride 0.65% Nasal 1 Spray(s) Both Nostrils two times a day      Objective:    03-17 @ 07:01  -  03-18 @ 07:00  --------------------------------------------------------  IN: 525 mL / OUT: 1 mL / NET: 524 mL      T(C): 36.4 (03-18-18 @ 08:13), Max: 36.4 (03-17-18 @ 20:05)  HR: 86 (03-18-18 @ 13:29) (72 - 91)  BP: 124/69 (03-18-18 @ 12:00) (123/78 - 172/67)  RR: 21 (03-18-18 @ 12:00) (14 - 24)  SpO2: 99% (03-18-18 @ 13:29) (92% - 100%)  Wt(kg): --      Physical Exam:  GEN: NAD, sitting in chair  HEENT: normocephalic and atraumatic. EOMI. BEATRICE. Moist mucosa. Clear Posterior pharynx.  NECK: Supple. No carotid bruits.  No lymphadenopathy or thyromegaly.  LUNGS: Decreased to auscultation.  HEART: Regular rate and rhythm without murmur.  ABDOMEN: Soft, nontender, and nondistended.  Positive bowel sounds.  No hepatosplenomegaly was noted.  EXTREMITIES: Without any cyanosis, clubbing, rash, lesions or edema.  NEUROLOGIC: Dementia  SKIN: No ulceration or induration present.      LABS:                        11.4   11.3  )-----------( 153      ( 18 Mar 2018 06:42 )             35.9       03-18    147<H>  |  106  |  36<H>  ----------------------------<  135<H>  3.7   |  35<H>  |  0.96    Ca    9.1      18 Mar 2018 06:42    MICROBIOLOGY:    Culture - Urine (collected 15 Mar 2018 21:40)  Source: .Urine Catheterized  Final Report (17 Mar 2018 17:20):    >100,000 CFU/ml Escherichia coli ESBL  Organism: Escherichia coli ESBL (17 Mar 2018 17:20)  Organism: Escherichia coli ESBL (17 Mar 2018 17:20)      -  Amikacin: S <=8      -  Ampicillin: R >16      -  Ampicillin/Sulbactam: R <=4/2      -  Aztreonam: R >16      -  Cefazolin: R >16      -  Cefepime: R >16      -  Cefoxitin: S 8      -  Ceftazidime: R 4      -  Ceftriaxone: R >32      -  Ciprofloxacin: R >2      -  Ertapenem: S <=0.5      -  Gentamicin: S <=1      -  Imipenem: S <=1      -  Levofloxacin: R >4      -  Meropenem: S <=1      -  Nitrofurantoin: S <=32      -  Piperacillin/Tazobactam: R <=8      -  Tobramycin: S <=2      -  Trimethoprim/Sulfamethoxazole: R >2/38      Method Type: ANN    Culture - Blood (collected 15 Mar 2018 21:36)  Source: .Blood Blood  Preliminary Report (16 Mar 2018 22:01):    No growth to date.    Culture - Blood (collected 15 Mar 2018 21:36)  Source: .Blood Blood  Preliminary Report (16 Mar 2018 22:01):    No growth to date.    RADIOLOGY & ADDITIONAL STUDIES:  PROCEDURE DATE:  03/15/2018      INTERPRETATION:  CT angiogram chest with contrast    History shortness of breath    Contrast Omnipaque 90 cc; 10 cc discarded    Axial MIPS included    There are bilateral pleural effusions, slightly larger on the left   occupying roughly 30% hemithorax. There is no pulmonary embolism. There   is no pericardial effusion. There is partial consolidation of the left   lower lobe without air bronchograms.  There is mild interstitial opacity   in the nondependent portions of both lower lobes and to a lesser degree   the upper lobes.The aorta is normal in caliber and enhances uniformly.   There is extensive thoracic ankylosis with kyphoscoliosis. Incidental   note is made of multiple small calcified stones in the otherwise normal   gallbladder.    IMPRESSION:    Negative for pulmonary embolism. Bilateral pleural effusions with   associated basilar atelectasis and interstitial pneumonia or pneumonitis    Assessment :   89 YO F resident of [facility] assisted living facility PMH of HTN CVA MVP Parkinson's   disease admitted with SOB sec hMPV bronchitis with pneumonitis  Rasheed pleural effusions  CHF  UTI  hx ESBL  Orophyrangeal dysphagia    Plan :   Cont Ertapenam day 3  Increase activity  Aspiration precautions    Continue with present regiment.  Appropriate use of antibiotics and adverse effects reviewed.    I have discussed the above plan of care with patient/ family in detail. They expressed understanding of the the treatment plan . Risks, benefits and alternatives discussed in detail. I have asked if they have any questions or concerns and appropriately addressed them to the best of my ability .      Critical care time greater then 35 minutes reviewing notes, labs data/ imaging , discussion with multidisciplinary team.    Thank you for allowing me to participate in care of your patient .        Santa Chavez MD  Infectious Disease  [phone]

## 2018-03-18 NOTE — PROGRESS NOTE ADULT - SUBJECTIVE AND OBJECTIVE BOX
Neurology follow up note    JESUS [id number]      Interval History:    Patient feels ok  sitting in chair     MEDICATIONS    ALBUTerol    0.083% 2.5 milliGRAM(s) Nebulizer every 6 hours  aspirin enteric coated 81 milliGRAM(s) Oral daily  buDESOnide   0.5 milliGRAM(s) Respule 0.5 milliGRAM(s) Inhalation two times a day  carbidopa/levodopa  25/100 2 Tablet(s) Oral <User Schedule>  carbidopa/levodopa CR 50/200 1 Tablet(s) Oral <User Schedule>  carbidopa/levodopa CR 50/200 0.5 Tablet(s) Oral <User Schedule>  docusate sodium 100 milliGRAM(s) Oral two times a day  enalapril 5 milliGRAM(s) Oral daily  enoxaparin Injectable 30 milliGRAM(s) SubCutaneous daily  ertapenem  IVPB 500 milliGRAM(s) IV Intermittent every 24 hours  escitalopram 10 milliGRAM(s) Oral daily  furosemide   Injectable 40 milliGRAM(s) IV Push daily  lactobacillus acidophilus 1 Tablet(s) Oral three times a day with meals  levothyroxine 100 MICROGram(s) Oral daily  polyethylene glycol 3350 17 Gram(s) Oral daily PRN  sodium chloride 0.65% Nasal 1 Spray(s) Both Nostrils two times a day      Allergies    beta blockers (Hives)  penicillin (Unknown)    Intolerances            Vital Signs Last 24 Hrs  T(C): 36.4 (18 Mar 2018 08:13), Max: 36.6 (17 Mar 2018 10:35)  T(F): 97.6 (18 Mar 2018 08:13), Max: 97.9 (17 Mar 2018 10:35)  HR: 84 (18 Mar 2018 08:05) (72 - 91)  BP: 142/98 (18 Mar 2018 08:00) (91/48 - 172/67)  BP(mean): 113 (18 Mar 2018 08:00) (62 - 113)  RR: 16 (18 Mar 2018 08:00) (14 - 24)  SpO2: 99% (18 Mar 2018 08:05) (92% - 100%)    REVIEW OF SYSTEMS:  Limit or unable to obtain secondary to patient's poor mental status.-- but feels ok         On Neurological Examination:  awake alert     Extraocular movements were intact.     Pupils were equal, round, and reactive bilaterally, 3 mm to 2.      Speech says few word        Motor, bilateral upper were 3/5, bilateral lower were in flex position.       No tremors in ext today    Increased tone in all four extremities.    Does follow commands      GENERAL Exam: Nontoxic , No Acute Distress   	  HEENT:  normocephalic, atraumatic  		  LUNGS:  Decreased bilaterally  	  HEART: Normal S1S2   No murmur RRR        	  GI/ ABDOMEN:  Soft  Non tender    EXTREMITIES:   No Edema  No Clubbing  No Cyanosis No Edema    MUSCULOSKELETAL: decreased Range of Motion all 4 extremities   	   SKIN: Normal  No Ecchymosis               LABS:  CBC Full  -  ( 18 Mar 2018 06:42 )  WBC Count : 11.3 K/uL  Hemoglobin : 11.4 g/dL  Hematocrit : 35.9 %  Platelet Count - Automated : 153 K/uL  Mean Cell Volume : 98.1 fl  Mean Cell Hemoglobin : 31.2 pg  Mean Cell Hemoglobin Concentration : 31.8 gm/dL  Auto Neutrophil # : x  Auto Lymphocyte # : x  Auto Monocyte # : x  Auto Eosinophil # : x  Auto Basophil # : x  Auto Neutrophil % : x  Auto Lymphocyte % : x  Auto Monocyte % : x  Auto Eosinophil % : x  Auto Basophil % : x      03-18    147<H>  |  106  |  36<H>  ----------------------------<  135<H>  3.7   |  35<H>  |  0.96    Ca    9.1      18 Mar 2018 06:42      Hemoglobin A1C:       Vitamin B12         RADIOLOGY    ANALYSIS AND PLAN:  A 89-year-old episode with change in mental status in regards to lethargy, history of Parkinson's disease, hypothyroidism.  1.	For change in mental status and lethargy most likely secondary to metabolic etiologies, respiratory issues.  2.	I would recommend monitor oxygen saturation, BiPAP as needed.  3.	For history of Parkinson's.  I will continue the patient on her Sinemet 25/100 two tablets at 9 a.m., 1 p.m., 5 p.m., 9 p.m.; and Sinemet extended release 200/50 half tablet 07:30 a.m. and one tablet at 10 p.m.  4.	For history of hypothyroidism, continue the patient on her Synthroid.  5.	The patient prognosis at present is guarded.  6.	overall more awake today   7.	if possible please avoid HALDOL try low dose zyprexa 2.5 if needed   8.	left message for son rocael  today 3/18/18    Thank you for the courtesy of this consultation.    Physical therapy evaluation as tolerated  OOB to chair/ambulation with assistance only if possible.    Greater than 45 minutes spent in direct patient care reviewing  the notes, lab data/ imaging , discussion with multidisciplinary team.

## 2018-03-18 NOTE — PROGRESS NOTE ADULT - SUBJECTIVE AND OBJECTIVE BOX
Date/Time Patient Seen:  		  Referring MD:   Data Reviewed	       Patient is a 89y old  Female who presents with a chief complaint of sob, Hypoxia (15 Mar 2018 15:43)  in bed  seen and examined  vs and meds reviewed      Subjective/HPI     PAST MEDICAL & SURGICAL HISTORY:  History of mitral valve prolapse  Gastric reflux  H/O TIA (transient ischemic attack) and stroke  Hypothyroid  Hypothyroidism, acquired  Parkinson's disease  Accelerated hypertension  History of tonsillectomy  History of cataract surgery  Hx of lumpectomy  History of appendectomy  History of ovarian cystectomy        Medication list         MEDICATIONS  (STANDING):  ALBUTerol    0.083% 2.5 milliGRAM(s) Nebulizer every 6 hours  aspirin enteric coated 81 milliGRAM(s) Oral daily  buDESOnide   0.5 milliGRAM(s) Respule 0.5 milliGRAM(s) Inhalation two times a day  carbidopa/levodopa  25/100 2 Tablet(s) Oral <User Schedule>  carbidopa/levodopa CR 50/200 1 Tablet(s) Oral <User Schedule>  carbidopa/levodopa CR 50/200 0.5 Tablet(s) Oral <User Schedule>  docusate sodium 100 milliGRAM(s) Oral two times a day  enalapril 5 milliGRAM(s) Oral daily  enoxaparin Injectable 30 milliGRAM(s) SubCutaneous daily  ertapenem  IVPB 500 milliGRAM(s) IV Intermittent every 24 hours  escitalopram 10 milliGRAM(s) Oral daily  furosemide   Injectable 40 milliGRAM(s) IV Push daily  lactobacillus acidophilus 1 Tablet(s) Oral three times a day with meals  levothyroxine 100 MICROGram(s) Oral daily  sodium chloride 0.65% Nasal 1 Spray(s) Both Nostrils two times a day    MEDICATIONS  (PRN):  polyethylene glycol 3350 17 Gram(s) Oral daily PRN Constipation         Vitals log        ICU Vital Signs Last 24 Hrs  T(C): 36.2 (18 Mar 2018 04:07), Max: 36.6 (17 Mar 2018 10:35)  T(F): 97.2 (18 Mar 2018 04:07), Max: 97.9 (17 Mar 2018 10:35)  HR: 87 (18 Mar 2018 06:10) (72 - 100)  BP: 130/107 (18 Mar 2018 06:10) (91/48 - 172/67)  BP(mean): 113 (18 Mar 2018 06:10) (62 - 113)  ABP: --  ABP(mean): --  RR: 14 (18 Mar 2018 06:10) (14 - 24)  SpO2: 98% (18 Mar 2018 06:10) (92% - 100%)           Input and Output:  I&O's Detail    17 Mar 2018 07:01  -  18 Mar 2018 07:00  --------------------------------------------------------  IN:    Oral Fluid: 525 mL  Total IN: 525 mL    OUT:    Voided: 1 mL  Total OUT: 1 mL    Total NET: 524 mL          Lab Data                        11.4   11.3  )-----------( 153      ( 18 Mar 2018 06:42 )             35.9     03-17    143  |  104  |  27<H>  ----------------------------<  144<H>  4.3   |  31  |  0.93    Ca    9.0      17 Mar 2018 06:53  Mg     2.2     03-16    TPro  6.5  /  Alb  2.3<L>  /  TBili  0.4  /  DBili  x   /  AST  20  /  ALT  <10<L>  /  AlkPhos  182<H>  03-16            Review of Systems	      Objective     Physical Examination    head at  heart s1s2  lung dec BS  abd soft      Pertinent Lab findings & Imaging      Radha:  NO   Adequate UO     I&O's Detail    17 Mar 2018 07:01  -  18 Mar 2018 07:00  --------------------------------------------------------  IN:    Oral Fluid: 525 mL  Total IN: 525 mL    OUT:    Voided: 1 mL  Total OUT: 1 mL    Total NET: 524 mL               Discussed with:     Cultures:	        Radiology

## 2018-03-18 NOTE — PROGRESS NOTE ADULT - SUBJECTIVE AND OBJECTIVE BOX
Patient is a 89y Female with a known history of :  Chronic diastolic congestive heart failure (I50.32)  Aortic valve stenosis, etiology of cardiac valve disease unspecified (I35.0)  Dementia with behavioral disturbance, unspecified dementia type (F03.91)  Agitation (R45.1)  Aortic stenosis (I35.0)  Human metapneumovirus (hMPV) pneumonia (J12.3)  Hypothyroidism, unspecified type (E03.9)  Parkinson's disease (G20)  Essential hypertension (I10)  Pleural effusion (J90)  Respiratory distress (R06.03)    HPI:  89 yr ol resident of [facility] assisted living facility, with PMH of Accelerated hypertension  Gastric reflux H/O TIA (transient ischemic attack) and stroke  History of mitral valve prolapse  Hypothyroid  Parkinson's disease  was having SOB, and was found to be hypoxic in facility and sent to hospital, In Ed pt is sob, oxygenating well, but markedly congested , altered mental state and had elevated D Dimer had CT angio has pleural effusion left more than rt.  . Unremarkable noncontrast CT scan of the brain.     Likely having CHf acute TTE ordered, cardio consult called, had b/l pleural effusion, pulmonary consult called, mild increase in LFT.  [facility] has many pts with hMPV infection , will try to out viral illness,  pt denies any bowel or urinary complaint, no fever (15 Mar 2018 15:43)      MEDICATIONS  (STANDING):  ALBUTerol    0.083% 2.5 milliGRAM(s) Nebulizer every 6 hours  aspirin enteric coated 81 milliGRAM(s) Oral daily  buDESOnide   0.5 milliGRAM(s) Respule 0.5 milliGRAM(s) Inhalation two times a day  carbidopa/levodopa  25/100 2 Tablet(s) Oral <User Schedule>  carbidopa/levodopa CR 50/200 1 Tablet(s) Oral <User Schedule>  carbidopa/levodopa CR 50/200 0.5 Tablet(s) Oral <User Schedule>  docusate sodium 100 milliGRAM(s) Oral two times a day  enalapril 5 milliGRAM(s) Oral daily  enoxaparin Injectable 30 milliGRAM(s) SubCutaneous daily  ertapenem  IVPB 500 milliGRAM(s) IV Intermittent every 24 hours  escitalopram 10 milliGRAM(s) Oral daily  lactobacillus acidophilus 1 Tablet(s) Oral three times a day with meals  levothyroxine 100 MICROGram(s) Oral daily  sodium chloride 0.65% Nasal 1 Spray(s) Both Nostrils two times a day    MEDICATIONS  (PRN):  polyethylene glycol 3350 17 Gram(s) Oral daily PRN Constipation      ALLERGIES: beta blockers (Hives)  penicillin (Unknown)      FAMILY HISTORY:      Social history:  Alochol:   Smoking:   Drug Use:   Marital Status:     PHYSICAL EXAMINATION:  -----------------------------  T(C): 36.4 (03-18-18 @ 08:13), Max: 36.4 (03-17-18 @ 12:24)  HR: 90 (03-18-18 @ 10:00) (72 - 91)  BP: 142/98 (03-18-18 @ 08:00) (91/48 - 172/67)  RR: 23 (03-18-18 @ 10:00) (14 - 24)  SpO2: 96% (03-18-18 @ 10:00) (92% - 100%)  Wt(kg): --    03-17 @ 07:01  -  03-18 @ 07:00  --------------------------------------------------------  IN:    Oral Fluid: 525 mL  Total IN: 525 mL    OUT:    Voided: 1 mL  Total OUT: 1 mL    Total NET: 524 mL            Constitutional: sitting in chairs.   Neck: normal jugular venous A waves present, normal jugular venous V waves present and no jugular venous alonzo A waves.   Pulmonary: fair air flow  Cardiovascular: heart rate and rhythm were normal, normal S1 and S2 distant.   Musculoskeletal: the gait could not be assessed..   Extremities: no clubbing of the fingernails, no localized cyanosis, no petechial hemorrhages and no ischemic changes.        LABS:   --------  CBC Full  -  ( 18 Mar 2018 06:42 )  WBC Count : 11.3 K/uL  Hemoglobin : 11.4 g/dL  Hematocrit : 35.9 %  Platelet Count - Automated : 153 K/uL  Mean Cell Volume : 98.1 fl  Mean Cell Hemoglobin : 31.2 pg  Mean Cell Hemoglobin Concentration : 31.8 gm/dL  Auto Neutrophil # : x  Auto Lymphocyte # : x  Auto Monocyte # : x  Auto Eosinophil # : x  Auto Basophil # : x  Auto Neutrophil % : x  Auto Lymphocyte % : x  Auto Monocyte % : x  Auto Eosinophil % : x  Auto Basophil % : x      03-18    147<H>  |  106  |  36<H>  ----------------------------<  135<H>  3.7   |  35<H>  |  0.96    Ca    9.1      18 Mar 2018 06:42           3/18/2018:  In ICU  Tele = NSR  Sitting in chair            Radiology:

## 2018-03-18 NOTE — PROGRESS NOTE ADULT - ASSESSMENT
89 year old female, history of Parkinson's, Hypertension, TIA, admitted for respiratory distress.  Echo = Mild LVH good EF, severe Aortic Stenosis. Chronic bilateral effusions.

## 2018-03-18 NOTE — PROGRESS NOTE ADULT - SUBJECTIVE AND OBJECTIVE BOX
Patient is a 89y old  Female who presents with a chief complaint of sob, Hypoxia (15 Mar 2018 15:43)      INTERVAL HPI/OVERNIGHT EVENTS:    Home Medications:  acetaminophen-diphenhydramine 500 mg-25 mg oral capsule:  (03 Dec 2017 18:30)  aspirin 81 mg oral delayed release capsule:  orally once a day (03 Dec 2017 18:30)  carbidopa-levodopa 25 mg-100 mg oral tablet: 2  orally 4 times a day (03 Dec 2017 18:30)  carbidopa-levodopa 50 mg-200 mg oral tablet, extended release: 1 tab(s) orally every 6 hours (03 Dec 2017 18:30)  Centrum oral tablet, chewable: 1 tab(s) orally once a day (03 Dec 2017 18:30)  Colace 100 mg oral capsule:  orally 3 times a day (03 Dec 2017 18:00)  docusate potassium 100 mg oral capsule:  (03 Dec 2017 18:30)  enalapril 5 mg oral tablet: 1 tab(s) orally once a day (03 Dec 2017 18:30)  ergocalciferol 50,000 intl units (1.25 mg) oral capsule: 1 cap(s) orally every other week (03 Dec 2017 18:30)  Flonase 50 mcg/inh nasal spray: 1 spray(s) nasal once a day (03 Dec 2017 18:30)  levothyroxine 100 mcg (0.1 mg) oral tablet: 1 tab(s) orally once a day (03 Dec 2017 18:30)  Lexapro 10 mg oral tablet: 1 tab(s) orally once a day (03 Dec 2017 18:30)  loratadine 10 mg oral tablet: 1 tab(s) orally once a day (03 Dec 2017 18:30)  Milk of Magnesia:  (03 Dec 2017 18:30)  MiraLax oral powder for reconstitution:  (03 Dec 2017 18:30)  Ocuvite Lutein oral capsule: 1 cap(s) orally 2 times a day (03 Dec 2017 18:00)  omeprazole 20 mg oral delayed release capsule:  orally 2 times a day (03 Dec 2017 18:00)  polyethylene glycol 3350 oral powder for reconstitution:  orally every 3 days (03 Dec 2017 18:00)  senna 8.6 mg oral tablet: 2  orally once a day (at bedtime) (03 Dec 2017 18:30)  Senna 8.6 mg oral tablet: 1 tab(s) orally once a day (at bedtime) (03 Dec 2017 18:30)  Systane ophthalmic solution: 1 drop(s) to each affected eye 2 times a day (03 Dec 2017 18:00)  Systane ophthalmic solution: 1 drop(s) to each affected eye 2 times a day (03 Dec 2017 18:30)  zinc oxide topical ointment:  (03 Dec 2017 18:30)      MEDICATIONS  (STANDING):  ALBUTerol    0.083% 2.5 milliGRAM(s) Nebulizer every 6 hours  aspirin enteric coated 81 milliGRAM(s) Oral daily  buDESOnide   0.5 milliGRAM(s) Respule 0.5 milliGRAM(s) Inhalation two times a day  carbidopa/levodopa  25/100 2 Tablet(s) Oral <User Schedule>  carbidopa/levodopa CR 50/200 1 Tablet(s) Oral <User Schedule>  carbidopa/levodopa CR 50/200 0.5 Tablet(s) Oral <User Schedule>  docusate sodium 100 milliGRAM(s) Oral two times a day  enalapril 5 milliGRAM(s) Oral daily  enoxaparin Injectable 30 milliGRAM(s) SubCutaneous daily  ertapenem  IVPB 500 milliGRAM(s) IV Intermittent every 24 hours  escitalopram 10 milliGRAM(s) Oral daily  furosemide   Injectable 40 milliGRAM(s) IV Push daily  lactobacillus acidophilus 1 Tablet(s) Oral three times a day with meals  levothyroxine 100 MICROGram(s) Oral daily  sodium chloride 0.65% Nasal 1 Spray(s) Both Nostrils two times a day    MEDICATIONS  (PRN):  polyethylene glycol 3350 17 Gram(s) Oral daily PRN Constipation      Allergies    beta blockers (Hives)  penicillin (Unknown)    Intolerances        REVIEW OF SYSTEMS:  unable to review in detail for pts Dementia, denies any pain or SOB  Vital Signs Last 24 Hrs  T(C): 36.4 (18 Mar 2018 08:13), Max: 36.6 (17 Mar 2018 10:35)  T(F): 97.6 (18 Mar 2018 08:13), Max: 97.9 (17 Mar 2018 10:35)  HR: 84 (18 Mar 2018 08:05) (72 - 91)  BP: 142/98 (18 Mar 2018 08:00) (91/48 - 172/67)  BP(mean): 113 (18 Mar 2018 08:00) (62 - 113)  RR: 16 (18 Mar 2018 08:00) (14 - 24)  SpO2: 99% (18 Mar 2018 08:05) (92% - 100%)    PHYSICAL EXAM:  GENERAL: NAD, well-groomed, well-developed  HEAD:  Atraumatic, Normocephalic  EYES: EOMI, PERRLA, conjunctiva and sclera clear  ENMT: Moist mucous membranes,   NECK: Supple, No JVD, Normal thyroid  NERVOUS SYSTEM:  Alert & Oriented X2,poor memory Motor Strength 3/5 B/L upper and lower extremities; DTRs 2+ intact and symmetric  CHEST/LUNG:BS decreased at bases  HEART: Regular rate and rhythm; No murmurs, rubs, or gallops  ABDOMEN: Soft, Nontender, Nondistended; Bowel sounds present  EXTREMITIES:  2+ Peripheral Pulses, No clubbing, cyanosis, or edema  LYMPH: No lymphadenopathy noted  SKIN: No rashes or lesions    LABS:                        11.4   11.3  )-----------( 153      ( 18 Mar 2018 06:42 )             35.9     03-18    147<H>  |  106  |  36<H>  ----------------------------<  135<H>  3.7   |  35<H>  |  0.96    Ca    9.1      18 Mar 2018 06:42          CAPILLARY BLOOD GLUCOSE            Culture - Urine (collected 03-15-18 @ 21:40)  Source: .Urine Catheterized  Final Report (03-17-18 @ 17:20):    >100,000 CFU/ml Escherichia coli ESBL  Organism: Escherichia coli ESBL (03-17-18 @ 17:20)  Organism: Escherichia coli ESBL (03-17-18 @ 17:20)      -  Amikacin: S <=8      -  Ampicillin: R >16      -  Ampicillin/Sulbactam: R <=4/2      -  Aztreonam: R >16      -  Cefazolin: R >16      -  Cefepime: R >16      -  Cefoxitin: S 8      -  Ceftazidime: R 4      -  Ceftriaxone: R >32      -  Ciprofloxacin: R >2      -  Ertapenem: S <=0.5      -  Gentamicin: S <=1      -  Imipenem: S <=1      -  Levofloxacin: R >4      -  Meropenem: S <=1      -  Nitrofurantoin: S <=32      -  Piperacillin/Tazobactam: R <=8      -  Tobramycin: S <=2      -  Trimethoprim/Sulfamethoxazole: R >2/38      Method Type: ANN    Culture - Blood (collected 03-15-18 @ 21:36)  Source: .Blood Blood  Preliminary Report (03-16-18 @ 22:01):    No growth to date.    Culture - Blood (collected 03-15-18 @ 21:36)  Source: .Blood Blood  Preliminary Report (03-16-18 @ 22:01):    No growth to date.        I&O's Summary    17 Mar 2018 07:01  -  18 Mar 2018 07:00  --------------------------------------------------------  IN: 525 mL / OUT: 1 mL / NET: 524 mL        RADIOLOGY & ADDITIONAL TESTS:    Imaging Personally Reviewed:  x[ ] YES  [ ] NO    Consultant(s) Notes Reviewed:  [ x] YES  [ ] NO    Care Discussed with Consultants/Other Providers [x ] YES  [ ] NO

## 2018-03-18 NOTE — PROGRESS NOTE ADULT - ASSESSMENT
89 yr ol resident of [facility] assisted living facility, with PMH of Accelerated hypertension  Gastric reflux H/O TIA (transient ischemic attack) and stroke  History of mitral valve prolapse  Hypothyroid  Parkinson's disease  was having SOB, and was found to be hypoxic in facility and sent to hospital, In Ed pt is sob, oxygenating well, but markedly congested , altered mental state and had elevated D Dimer had CT angio has pleural effusion left more than rt.  . Unremarkable noncontrast CT scan of the brain.     Likely having CHf acute TTE ordered, cardio consult called, had b/l pleural effusion, pulmonary consult called, mild increase in LFT.  [facility] has many pts with hMPV infection , will try to out viral illness,  pt denies any bowel or urinary complaint, no fever   recently was in ed for head trauma,and was treated with abx for uti recently  admitted with acute respiratory distress with b/l pleural effusion with possible chf with H/o HTN with MVP, PE ruled out  has hMPV virus pneumonia with left interstitiai, secondary bacterial PNA with severe aortic stenosis, B/l pleural effusion left >rt, with moderate pulmonary HTN with diastolic dysfunction, with possible uti e coli ESBl in urine, started on  levofloxacin in ED,had  ID consult,and abx changed to ertapanem  swallowing assesement, out of bed,BIPAP as needed as per pulmonary, has soft abdominal distension non tender had  US abd no abnormality except cholelithiasis asymptomatic    d/w grandson who is ID physician, at [phone]  d/w with son ROberttoday- pt off oxygen, on  ertapanem for ESBL uti, orophryngeal dysphagia on puree diet with thikned liquids  increase activity PT ruben  had mild agitaion at night required haldol, calm at present, advised by neuro zyprexa if needed for agitation,  diuresis as needed for CHF,parkinsons stable with tt as advised by neuro, dementia with behav disorder stable with supportive care, will cont levothyroxin for hypothyroidism  patient generalyy better

## 2018-03-19 LAB
ANION GAP SERPL CALC-SCNC: 3 MMOL/L — LOW (ref 5–17)
ANION GAP SERPL CALC-SCNC: 5 MMOL/L — SIGNIFICANT CHANGE UP (ref 5–17)
BASOPHILS # BLD AUTO: 0 K/UL — SIGNIFICANT CHANGE UP (ref 0–0.2)
BASOPHILS NFR BLD AUTO: 0.3 % — SIGNIFICANT CHANGE UP (ref 0–2)
BUN SERPL-MCNC: 43 MG/DL — HIGH (ref 7–23)
BUN SERPL-MCNC: 45 MG/DL — HIGH (ref 7–23)
CALCIUM SERPL-MCNC: 8.6 MG/DL — SIGNIFICANT CHANGE UP (ref 8.4–10.5)
CALCIUM SERPL-MCNC: 8.9 MG/DL — SIGNIFICANT CHANGE UP (ref 8.4–10.5)
CHLORIDE SERPL-SCNC: 105 MMOL/L — SIGNIFICANT CHANGE UP (ref 96–108)
CHLORIDE SERPL-SCNC: 106 MMOL/L — SIGNIFICANT CHANGE UP (ref 96–108)
CO2 SERPL-SCNC: 34 MMOL/L — HIGH (ref 22–31)
CO2 SERPL-SCNC: 39 MMOL/L — HIGH (ref 22–31)
CREAT SERPL-MCNC: 0.77 MG/DL — SIGNIFICANT CHANGE UP (ref 0.5–1.3)
CREAT SERPL-MCNC: 0.86 MG/DL — SIGNIFICANT CHANGE UP (ref 0.5–1.3)
EOSINOPHIL # BLD AUTO: 0 K/UL — SIGNIFICANT CHANGE UP (ref 0–0.5)
EOSINOPHIL NFR BLD AUTO: 0.1 % — SIGNIFICANT CHANGE UP (ref 0–6)
GLUCOSE SERPL-MCNC: 112 MG/DL — HIGH (ref 70–99)
GLUCOSE SERPL-MCNC: 96 MG/DL — SIGNIFICANT CHANGE UP (ref 70–99)
HCT VFR BLD CALC: 36.2 % — SIGNIFICANT CHANGE UP (ref 34.5–45)
HGB BLD-MCNC: 11.6 G/DL — SIGNIFICANT CHANGE UP (ref 11.5–15.5)
LYMPHOCYTES # BLD AUTO: 1.2 K/UL — SIGNIFICANT CHANGE UP (ref 1–3.3)
LYMPHOCYTES # BLD AUTO: 10.3 % — LOW (ref 13–44)
MCHC RBC-ENTMCNC: 32 PG — SIGNIFICANT CHANGE UP (ref 27–34)
MCHC RBC-ENTMCNC: 32.1 GM/DL — SIGNIFICANT CHANGE UP (ref 32–36)
MCV RBC AUTO: 99.8 FL — SIGNIFICANT CHANGE UP (ref 80–100)
MONOCYTES # BLD AUTO: 1.2 K/UL — HIGH (ref 0–0.9)
MONOCYTES NFR BLD AUTO: 9.6 % — SIGNIFICANT CHANGE UP (ref 2–14)
NEUTROPHILS # BLD AUTO: 9.6 K/UL — HIGH (ref 1.8–7.4)
NEUTROPHILS NFR BLD AUTO: 79.7 % — HIGH (ref 43–77)
PLATELET # BLD AUTO: 138 K/UL — LOW (ref 150–400)
POTASSIUM SERPL-MCNC: 2.9 MMOL/L — CRITICAL LOW (ref 3.5–5.3)
POTASSIUM SERPL-MCNC: 4.3 MMOL/L — SIGNIFICANT CHANGE UP (ref 3.5–5.3)
POTASSIUM SERPL-SCNC: 2.9 MMOL/L — CRITICAL LOW (ref 3.5–5.3)
POTASSIUM SERPL-SCNC: 4.3 MMOL/L — SIGNIFICANT CHANGE UP (ref 3.5–5.3)
RBC # BLD: 3.63 M/UL — LOW (ref 3.8–5.2)
RBC # FLD: 15.5 % — HIGH (ref 10.3–14.5)
SODIUM SERPL-SCNC: 145 MMOL/L — SIGNIFICANT CHANGE UP (ref 135–145)
SODIUM SERPL-SCNC: 147 MMOL/L — HIGH (ref 135–145)
WBC # BLD: 12.1 K/UL — HIGH (ref 3.8–10.5)
WBC # FLD AUTO: 12.1 K/UL — HIGH (ref 3.8–10.5)

## 2018-03-19 RX ORDER — POTASSIUM CHLORIDE 20 MEQ
40 PACKET (EA) ORAL ONCE
Qty: 0 | Refills: 0 | Status: COMPLETED | OUTPATIENT
Start: 2018-03-19 | End: 2018-03-19

## 2018-03-19 RX ORDER — POTASSIUM CHLORIDE 20 MEQ
40 PACKET (EA) ORAL EVERY 4 HOURS
Qty: 0 | Refills: 0 | Status: DISCONTINUED | OUTPATIENT
Start: 2018-03-19 | End: 2018-03-19

## 2018-03-19 RX ADMIN — Medication 1 TABLET(S): at 11:57

## 2018-03-19 RX ADMIN — ESCITALOPRAM OXALATE 10 MILLIGRAM(S): 10 TABLET, FILM COATED ORAL at 11:56

## 2018-03-19 RX ADMIN — ENOXAPARIN SODIUM 30 MILLIGRAM(S): 100 INJECTION SUBCUTANEOUS at 11:56

## 2018-03-19 RX ADMIN — CARBIDOPA AND LEVODOPA 2 TABLET(S): 25; 100 TABLET ORAL at 21:40

## 2018-03-19 RX ADMIN — Medication 1 TABLET(S): at 17:04

## 2018-03-19 RX ADMIN — ERTAPENEM SODIUM 100 MILLIGRAM(S): 1 INJECTION, POWDER, LYOPHILIZED, FOR SOLUTION INTRAMUSCULAR; INTRAVENOUS at 17:33

## 2018-03-19 RX ADMIN — Medication 5 MILLIGRAM(S): at 05:24

## 2018-03-19 RX ADMIN — Medication 100 MICROGRAM(S): at 05:24

## 2018-03-19 RX ADMIN — CARBIDOPA AND LEVODOPA 0.5 TABLET(S): 25; 100 TABLET ORAL at 06:39

## 2018-03-19 RX ADMIN — ALBUTEROL 2.5 MILLIGRAM(S): 90 AEROSOL, METERED ORAL at 08:04

## 2018-03-19 RX ADMIN — CARBIDOPA AND LEVODOPA 2 TABLET(S): 25; 100 TABLET ORAL at 17:04

## 2018-03-19 RX ADMIN — Medication 100 MILLIGRAM(S): at 05:24

## 2018-03-19 RX ADMIN — CARBIDOPA AND LEVODOPA 2 TABLET(S): 25; 100 TABLET ORAL at 12:00

## 2018-03-19 RX ADMIN — Medication 100 MILLIGRAM(S): at 17:04

## 2018-03-19 RX ADMIN — ALBUTEROL 2.5 MILLIGRAM(S): 90 AEROSOL, METERED ORAL at 14:16

## 2018-03-19 RX ADMIN — CARBIDOPA AND LEVODOPA 2 TABLET(S): 25; 100 TABLET ORAL at 08:20

## 2018-03-19 RX ADMIN — Medication 0.5 MILLIGRAM(S): at 08:04

## 2018-03-19 RX ADMIN — CARBIDOPA AND LEVODOPA 1 TABLET(S): 25; 100 TABLET ORAL at 22:48

## 2018-03-19 RX ADMIN — ALBUTEROL 2.5 MILLIGRAM(S): 90 AEROSOL, METERED ORAL at 01:53

## 2018-03-19 RX ADMIN — Medication 40 MILLIEQUIVALENT(S): at 08:23

## 2018-03-19 RX ADMIN — Medication 1 TABLET(S): at 08:20

## 2018-03-19 RX ADMIN — Medication 1 SPRAY(S): at 17:04

## 2018-03-19 RX ADMIN — Medication 81 MILLIGRAM(S): at 11:56

## 2018-03-19 RX ADMIN — Medication 40 MILLIEQUIVALENT(S): at 12:00

## 2018-03-19 NOTE — PROGRESS NOTE ADULT - SUBJECTIVE AND OBJECTIVE BOX
Patient is a 89y old  Female who presents with a chief complaint of sob, Hypoxia (15 Mar 2018 15:43)      INTERVAL HPI/OVERNIGHT EVENTS:no acute event, confused    Home Medications:  acetaminophen-diphenhydramine 500 mg-25 mg oral capsule:  (03 Dec 2017 18:30)  aspirin 81 mg oral delayed release capsule:  orally once a day (03 Dec 2017 18:30)  carbidopa-levodopa 25 mg-100 mg oral tablet: 2  orally 4 times a day (03 Dec 2017 18:30)  carbidopa-levodopa 50 mg-200 mg oral tablet, extended release: 1 tab(s) orally every 6 hours (03 Dec 2017 18:30)  Centrum oral tablet, chewable: 1 tab(s) orally once a day (03 Dec 2017 18:30)  Colace 100 mg oral capsule:  orally 3 times a day (03 Dec 2017 18:00)  docusate potassium 100 mg oral capsule:  (03 Dec 2017 18:30)  enalapril 5 mg oral tablet: 1 tab(s) orally once a day (03 Dec 2017 18:30)  ergocalciferol 50,000 intl units (1.25 mg) oral capsule: 1 cap(s) orally every other week (03 Dec 2017 18:30)  Flonase 50 mcg/inh nasal spray: 1 spray(s) nasal once a day (03 Dec 2017 18:30)  levothyroxine 100 mcg (0.1 mg) oral tablet: 1 tab(s) orally once a day (03 Dec 2017 18:30)  Lexapro 10 mg oral tablet: 1 tab(s) orally once a day (03 Dec 2017 18:30)  loratadine 10 mg oral tablet: 1 tab(s) orally once a day (03 Dec 2017 18:30)  Milk of Magnesia:  (03 Dec 2017 18:30)  MiraLax oral powder for reconstitution:  (03 Dec 2017 18:30)  Ocuvite Lutein oral capsule: 1 cap(s) orally 2 times a day (03 Dec 2017 18:00)  omeprazole 20 mg oral delayed release capsule:  orally 2 times a day (03 Dec 2017 18:00)  polyethylene glycol 3350 oral powder for reconstitution:  orally every 3 days (03 Dec 2017 18:00)  senna 8.6 mg oral tablet: 2  orally once a day (at bedtime) (03 Dec 2017 18:30)  Senna 8.6 mg oral tablet: 1 tab(s) orally once a day (at bedtime) (03 Dec 2017 18:30)  Systane ophthalmic solution: 1 drop(s) to each affected eye 2 times a day (03 Dec 2017 18:00)  Systane ophthalmic solution: 1 drop(s) to each affected eye 2 times a day (03 Dec 2017 18:30)  zinc oxide topical ointment:  (03 Dec 2017 18:30)      MEDICATIONS  (STANDING):  ALBUTerol    0.083% 2.5 milliGRAM(s) Nebulizer every 6 hours  aspirin enteric coated 81 milliGRAM(s) Oral daily  buDESOnide   0.5 milliGRAM(s) Respule 0.5 milliGRAM(s) Inhalation two times a day  carbidopa/levodopa  25/100 2 Tablet(s) Oral <User Schedule>  carbidopa/levodopa CR 50/200 1 Tablet(s) Oral <User Schedule>  carbidopa/levodopa CR 50/200 0.5 Tablet(s) Oral <User Schedule>  docusate sodium 100 milliGRAM(s) Oral two times a day  enalapril 5 milliGRAM(s) Oral daily  enoxaparin Injectable 30 milliGRAM(s) SubCutaneous daily  ertapenem  IVPB 500 milliGRAM(s) IV Intermittent every 24 hours  escitalopram 10 milliGRAM(s) Oral daily  lactobacillus acidophilus 1 Tablet(s) Oral three times a day with meals  levothyroxine 100 MICROGram(s) Oral daily  sodium chloride 0.65% Nasal 1 Spray(s) Both Nostrils two times a day    MEDICATIONS  (PRN):  polyethylene glycol 3350 17 Gram(s) Oral daily PRN Constipation      Allergies    beta blockers (Hives)  penicillin (Unknown)    Intolerances        REVIEW OF SYSTEMS:  unable as pt confused, discussed with staff , pt is confused  Vital Signs Last 24 Hrs  T(C): 36.2 (19 Mar 2018 08:49), Max: 37.5 (19 Mar 2018 04:06)  T(F): 97.2 (19 Mar 2018 08:49), Max: 99.5 (19 Mar 2018 04:06)  HR: 78 (19 Mar 2018 08:41) (76 - 112)  BP: 151/77 (19 Mar 2018 08:12) (111/58 - 158/70)  BP(mean): 99 (19 Mar 2018 08:12) (68 - 99)  RR: 23 (19 Mar 2018 08:12) (15 - 23)  SpO2: 98% (19 Mar 2018 08:41) (91% - 100%)    PHYSICAL EXAM:  GENERAL: NAD, well-groomed, well-developed  HEAD:  Atraumatic, Normocephalic  EYES: EOMI, PERRLA, conjunctiva and sclera clear  ENMT: Moist mucous membranes,   NECK: Supple, No JVD, Normal thyroid  NERVOUS SYSTEM:  confused, agitated, unable to ambulate  CHEST/LUNG: BS decreased at bases  HEART: Regular rate and rhythm; No murmurs, rubs, or gallops  ABDOMEN: Soft, Nontender, Nondistended; Bowel sounds present  EXTREMITIES:  2+ Peripheral Pulses, No clubbing, cyanosis, or edema  LYMPH: No lymphadenopathy noted  SKIN: No rashes or lesions    LABS:                        11.6   12.1  )-----------( 138      ( 19 Mar 2018 07:09 )             36.2     03-19    147<H>  |  105  |  43<H>  ----------------------------<  112<H>  2.9<LL>   |  39<H>  |  0.86    Ca    8.9      19 Mar 2018 07:09          CAPILLARY BLOOD GLUCOSE            Culture - Urine (collected 03-15-18 @ 21:40)  Source: .Urine Catheterized  Final Report (03-17-18 @ 17:20):    >100,000 CFU/ml Escherichia coli ESBL  Organism: Escherichia coli ESBL (03-17-18 @ 17:20)  Organism: Escherichia coli ESBL (03-17-18 @ 17:20)      -  Amikacin: S <=8      -  Ampicillin: R >16      -  Ampicillin/Sulbactam: R <=4/2      -  Aztreonam: R >16      -  Cefazolin: R >16      -  Cefepime: R >16      -  Cefoxitin: S 8      -  Ceftazidime: R 4      -  Ceftriaxone: R >32      -  Ciprofloxacin: R >2      -  Ertapenem: S <=0.5      -  Gentamicin: S <=1      -  Imipenem: S <=1      -  Levofloxacin: R >4      -  Meropenem: S <=1      -  Nitrofurantoin: S <=32      -  Piperacillin/Tazobactam: R <=8      -  Tobramycin: S <=2      -  Trimethoprim/Sulfamethoxazole: R >2/38      Method Type: ANN    Culture - Blood (collected 03-15-18 @ 21:36)  Source: .Blood Blood  Preliminary Report (03-16-18 @ 22:01):    No growth to date.    Culture - Blood (collected 03-15-18 @ 21:36)  Source: .Blood Blood  Preliminary Report (03-16-18 @ 22:01):    No growth to date.        I&O's Summary    18 Mar 2018 07:01  -  19 Mar 2018 07:00  --------------------------------------------------------  IN: 870 mL / OUT: 0 mL / NET: 870 mL        RADIOLOGY & ADDITIONAL TESTS:    Imaging Personally Reviewed:  [x ] YES  [ ] NO    Consultant(s) Notes Reviewed:  [x ] YES  [ ] NO    Care Discussed with Consultants/Other Providers [x ] YES  [ ] NO

## 2018-03-19 NOTE — PROGRESS NOTE ADULT - SUBJECTIVE AND OBJECTIVE BOX
Date/Time Patient Seen:  		  Referring MD:   Data Reviewed	       Patient is a 89y old  Female who presents with a chief complaint of sob, Hypoxia (15 Mar 2018 15:43)  in bed  seen and examined  vs and meds reviewed  on ABX      Subjective/HPI     PAST MEDICAL & SURGICAL HISTORY:  History of mitral valve prolapse  Gastric reflux  H/O TIA (transient ischemic attack) and stroke  Hypothyroid  Hypothyroidism, acquired  Parkinson's disease  Accelerated hypertension  History of tonsillectomy  History of cataract surgery  Hx of lumpectomy  History of appendectomy  History of ovarian cystectomy        Medication list         MEDICATIONS  (STANDING):  ALBUTerol    0.083% 2.5 milliGRAM(s) Nebulizer every 6 hours  aspirin enteric coated 81 milliGRAM(s) Oral daily  buDESOnide   0.5 milliGRAM(s) Respule 0.5 milliGRAM(s) Inhalation two times a day  carbidopa/levodopa  25/100 2 Tablet(s) Oral <User Schedule>  carbidopa/levodopa CR 50/200 1 Tablet(s) Oral <User Schedule>  carbidopa/levodopa CR 50/200 0.5 Tablet(s) Oral <User Schedule>  docusate sodium 100 milliGRAM(s) Oral two times a day  enalapril 5 milliGRAM(s) Oral daily  enoxaparin Injectable 30 milliGRAM(s) SubCutaneous daily  ertapenem  IVPB 500 milliGRAM(s) IV Intermittent every 24 hours  escitalopram 10 milliGRAM(s) Oral daily  lactobacillus acidophilus 1 Tablet(s) Oral three times a day with meals  levothyroxine 100 MICROGram(s) Oral daily  sodium chloride 0.65% Nasal 1 Spray(s) Both Nostrils two times a day    MEDICATIONS  (PRN):  polyethylene glycol 3350 17 Gram(s) Oral daily PRN Constipation         Vitals log        ICU Vital Signs Last 24 Hrs  T(C): 37.5 (19 Mar 2018 04:06), Max: 37.5 (19 Mar 2018 04:06)  T(F): 99.5 (19 Mar 2018 04:06), Max: 99.5 (19 Mar 2018 04:06)  HR: 76 (19 Mar 2018 06:00) (76 - 112)  BP: 158/70 (19 Mar 2018 04:20) (111/58 - 158/70)  BP(mean): 88 (19 Mar 2018 04:20) (68 - 113)  ABP: --  ABP(mean): --  RR: 16 (19 Mar 2018 06:00) (15 - 23)  SpO2: 98% (19 Mar 2018 06:00) (91% - 100%)           Input and Output:  I&O's Detail    18 Mar 2018 07:01  -  19 Mar 2018 07:00  --------------------------------------------------------  IN:    IV PiggyBack: 50 mL    Oral Fluid: 820 mL  Total IN: 870 mL    OUT:  Total OUT: 0 mL    Total NET: 870 mL          Lab Data                        11.4   11.3  )-----------( 153      ( 18 Mar 2018 06:42 )             35.9     03-18    147<H>  |  106  |  36<H>  ----------------------------<  135<H>  3.7   |  35<H>  |  0.96    Ca    9.1      18 Mar 2018 06:42              Review of Systems	      Objective     Physical Examination    head at  heart s1s2  lung dec BS  abd soft      Pertinent Lab findings & Imaging      Radha:  NO   Adequate UO     I&O's Detail    18 Mar 2018 07:01  -  19 Mar 2018 07:00  --------------------------------------------------------  IN:    IV PiggyBack: 50 mL    Oral Fluid: 820 mL  Total IN: 870 mL    OUT:  Total OUT: 0 mL    Total NET: 870 mL               Discussed with:     Cultures:	        Radiology

## 2018-03-19 NOTE — GOALS OF CARE CONVERSATION - PERSONAL ADVANCE DIRECTIVE - CONVERSATION DETAILS
Spoke to pts son Michael about advance directives. His son is an MD and advised him on all advance directives. He states he wants no extraordinary measures such as CPR or intubation

## 2018-03-19 NOTE — CHART NOTE - NSCHARTNOTEFT_GEN_A_CORE
Assessment: Pt continues on Pureed diet c nectar thick flds per SLP eval recommendations 3/16. Also receiving Ensure Enlive supplements c meals (provides up to 1050kcal, 60 g protein/day). Pt is tolerating well c varied intake. Aspiration precautions maintained. 1:1 aide assisting c feedings- pt visited while eating lunch, consumed most of nutritional supplement, eats slowly/needs prolonged feeding time.     Factors impacting intake: [ ] none [ ] nausea  [ ] vomiting [ ] diarrhea [ ] constipation  [ ]chewing problems [ ] swallowing issues  [ ] other:     Diet Presciption: Diet, Dysphagia 1 Pureed-Nectar Consistency Fluid:   Supplement Feeding Modality:  Oral  Ensure Enlive Servings Per Day:  1       Frequency:  Three Times a day (03-17-18 @ 10:27)    Intake: varies, consumes most of supplement    Current Weight: Weight (kg): 45.1 (03-15 @ 15:00)  % Weight Change 3/19 94.1#, 5 lb wt loss noticed since adm, if accurate. Wt generally stable x 3 days (ranges 92-94#)    Pertinent Medications: MEDICATIONS  (STANDING):  ALBUTerol    0.083% 2.5 milliGRAM(s) Nebulizer every 6 hours  aspirin enteric coated 81 milliGRAM(s) Oral daily  buDESOnide   0.5 milliGRAM(s) Respule 0.5 milliGRAM(s) Inhalation two times a day  carbidopa/levodopa  25/100 2 Tablet(s) Oral <User Schedule>  carbidopa/levodopa CR 50/200 1 Tablet(s) Oral <User Schedule>  carbidopa/levodopa CR 50/200 0.5 Tablet(s) Oral <User Schedule>  docusate sodium 100 milliGRAM(s) Oral two times a day  enalapril 5 milliGRAM(s) Oral daily  enoxaparin Injectable 30 milliGRAM(s) SubCutaneous daily  ertapenem  IVPB 500 milliGRAM(s) IV Intermittent every 24 hours  escitalopram 10 milliGRAM(s) Oral daily  lactobacillus acidophilus 1 Tablet(s) Oral three times a day with meals  levothyroxine 100 MICROGram(s) Oral daily  potassium chloride    Tablet ER 40 milliEquivalent(s) Oral every 4 hours  sodium chloride 0.65% Nasal 1 Spray(s) Both Nostrils two times a day    MEDICATIONS  (PRN):  polyethylene glycol 3350 17 Gram(s) Oral daily PRN Constipation    Pertinent Labs: 03-19 Na147 mmol/L<H> Glu 112 mg/dL<H> K+ 2.9 mmol/L<LL> Cr  0.86 mg/dL BUN 43 mg/dL<H> 03-16 Alb 2.3 g/dL<L>     CAPILLARY BLOOD GLUCOSE        Skin: sacrum stage I c DTI    Estimated Needs:   [ x] no change since previous assessment  [ ] recalculated:     Previous Nutrition Diagnosis:   [ ] Inadequate Energy Intake [ ]Inadequate Oral Intake [ ] Excessive Energy Intake   [ ] Underweight [x ] Increased Nutrient Needs [ ] Overweight/Obesity   [ ] Altered GI Function [ ] Unintended Weight Loss [ ] Food & Nutrition Related Knowledge Deficit [ ] Malnutrition     Nutrition Diagnosis is [ x] ongoing  [ ] resolved [ ] not applicable     New Nutrition Diagnosis: [ ] not applicable       Interventions: c/w dysphagia diet (pureed c nectar flds), 1:1 assist at meals + asp precautions, ensure supplement tid  Recommend  [ ] Change Diet To:  [ ] Nutrition Supplement  [ ] Nutrition Support  [ ] Other:     Monitoring and Evaluation:   [x ] PO intake [ x ] Tolerance to diet prescription [ x ] weights [ x ] labs[ x ] follow up per protocol  [ ] other:

## 2018-03-19 NOTE — PROGRESS NOTE ADULT - SUBJECTIVE AND OBJECTIVE BOX
Neurology follow up note    JESUS [id number]      Interval History:    Patient feels ok     MEDICATIONS    ALBUTerol    0.083% 2.5 milliGRAM(s) Nebulizer every 6 hours  aspirin enteric coated 81 milliGRAM(s) Oral daily  buDESOnide   0.5 milliGRAM(s) Respule 0.5 milliGRAM(s) Inhalation two times a day  carbidopa/levodopa  25/100 2 Tablet(s) Oral <User Schedule>  carbidopa/levodopa CR 50/200 1 Tablet(s) Oral <User Schedule>  carbidopa/levodopa CR 50/200 0.5 Tablet(s) Oral <User Schedule>  docusate sodium 100 milliGRAM(s) Oral two times a day  enalapril 5 milliGRAM(s) Oral daily  enoxaparin Injectable 30 milliGRAM(s) SubCutaneous daily  ertapenem  IVPB 500 milliGRAM(s) IV Intermittent every 24 hours  escitalopram 10 milliGRAM(s) Oral daily  lactobacillus acidophilus 1 Tablet(s) Oral three times a day with meals  levothyroxine 100 MICROGram(s) Oral daily  polyethylene glycol 3350 17 Gram(s) Oral daily PRN  potassium chloride    Tablet ER 40 milliEquivalent(s) Oral every 4 hours  sodium chloride 0.65% Nasal 1 Spray(s) Both Nostrils two times a day      Allergies    beta blockers (Hives)  penicillin (Unknown)    Intolerances            Vital Signs Last 24 Hrs  T(C): 36.6 (19 Mar 2018 12:25), Max: 37.5 (19 Mar 2018 04:06)  T(F): 97.8 (19 Mar 2018 12:25), Max: 99.5 (19 Mar 2018 04:06)  HR: 84 (19 Mar 2018 12:00) (76 - 112)  BP: 124/55 (19 Mar 2018 12:00) (105/57 - 158/70)  BP(mean): 76 (19 Mar 2018 12:00) (68 - 99)  RR: 16 (19 Mar 2018 12:00) (15 - 26)  SpO2: 97% (19 Mar 2018 12:00) (91% - 100%)      REVIEW OF SYSTEMS:  Limit or unable to obtain secondary to patient's poor mental status.-- but feels ok     On Neurological Examination:  awake alert     Extraocular movements were intact.     Pupils were equal, round, and reactive bilaterally, 3 mm to 2.      Speech says few word        Motor, bilateral upper were 3/5, bilateral lower were in flex position.       No tremors in ext today    Increased tone in all four extremities.    Does follow commands    GENERAL Exam: Nontoxic , No Acute Distress   	  HEENT:  normocephalic, atraumatic  		  LUNGS:  Decreased bilaterally  	  HEART: Normal S1S2   No murmur RRR        	  GI/ ABDOMEN:  Soft  Non tender    EXTREMITIES:   No Edema  No Clubbing  No Cyanosis No Edema    MUSCULOSKELETAL: decreased Range of Motion all 4 extremities   	   SKIN: Normal  No Ecchymosis                  LABS:  CBC Full  -  ( 19 Mar 2018 07:09 )  WBC Count : 12.1 K/uL  Hemoglobin : 11.6 g/dL  Hematocrit : 36.2 %  Platelet Count - Automated : 138 K/uL  Mean Cell Volume : 99.8 fl  Mean Cell Hemoglobin : 32.0 pg  Mean Cell Hemoglobin Concentration : 32.1 gm/dL  Auto Neutrophil # : 9.6 K/uL  Auto Lymphocyte # : 1.2 K/uL  Auto Monocyte # : 1.2 K/uL  Auto Eosinophil # : 0.0 K/uL  Auto Basophil # : 0.0 K/uL  Auto Neutrophil % : 79.7 %  Auto Lymphocyte % : 10.3 %  Auto Monocyte % : 09.6 %  Auto Eosinophil % : 0.1 %  Auto Basophil % : 0.3 %      03-19    147<H>  |  105  |  43<H>  ----------------------------<  112<H>  2.9<LL>   |  39<H>  |  0.86    Ca    8.9      19 Mar 2018 07:09      Hemoglobin A1C:       Vitamin B12         RADIOLOGY    ANALYSIS AND PLAN:  A 89-year-old episode with change in mental status in regards to lethargy, history of Parkinson's disease, hypothyroidism.  1.	For change in mental status and lethargy most likely secondary to metabolic etiologies, respiratory issues.  2.	I would recommend monitor oxygen saturation, BiPAP as needed.  3.	For history of Parkinson's.  I will continue the patient on her Sinemet 25/100 two tablets at 9 a.m., 1 p.m., 5 p.m., 9 p.m.; and Sinemet extended release 200/50 half tablet 07:30 a.m. and one tablet at 10 p.m.  4.	For history of hypothyroidism, continue the patient on her Synthroid.  5.	The patient prognosis at present is guarded.  6.	overall more awake today   7.	if possible please avoid HALDOL try low dose zyprexa 2.5 if needed   8.	Michael  today 3/19/18    Thank you for the courtesy of this consultation.    Physical therapy evaluation as tolerated  OOB to chair/ambulation with assistance only if possible.    Greater than 40 minutes spent in direct patient care reviewing  the notes, lab data/ imaging , discussion with multidisciplinary team. Neurology follow up note    JESUS [id number]      Interval History:    Patient feels ok     MEDICATIONS    ALBUTerol    0.083% 2.5 milliGRAM(s) Nebulizer every 6 hours  aspirin enteric coated 81 milliGRAM(s) Oral daily  buDESOnide   0.5 milliGRAM(s) Respule 0.5 milliGRAM(s) Inhalation two times a day  carbidopa/levodopa  25/100 2 Tablet(s) Oral <User Schedule>  carbidopa/levodopa CR 50/200 1 Tablet(s) Oral <User Schedule>  carbidopa/levodopa CR 50/200 0.5 Tablet(s) Oral <User Schedule>  docusate sodium 100 milliGRAM(s) Oral two times a day  enalapril 5 milliGRAM(s) Oral daily  enoxaparin Injectable 30 milliGRAM(s) SubCutaneous daily  ertapenem  IVPB 500 milliGRAM(s) IV Intermittent every 24 hours  escitalopram 10 milliGRAM(s) Oral daily  lactobacillus acidophilus 1 Tablet(s) Oral three times a day with meals  levothyroxine 100 MICROGram(s) Oral daily  polyethylene glycol 3350 17 Gram(s) Oral daily PRN  potassium chloride    Tablet ER 40 milliEquivalent(s) Oral every 4 hours  sodium chloride 0.65% Nasal 1 Spray(s) Both Nostrils two times a day      Allergies    beta blockers (Hives)  penicillin (Unknown)    Intolerances            Vital Signs Last 24 Hrs  T(C): 36.6 (19 Mar 2018 12:25), Max: 37.5 (19 Mar 2018 04:06)  T(F): 97.8 (19 Mar 2018 12:25), Max: 99.5 (19 Mar 2018 04:06)  HR: 84 (19 Mar 2018 12:00) (76 - 112)  BP: 124/55 (19 Mar 2018 12:00) (105/57 - 158/70)  BP(mean): 76 (19 Mar 2018 12:00) (68 - 99)  RR: 16 (19 Mar 2018 12:00) (15 - 26)  SpO2: 97% (19 Mar 2018 12:00) (91% - 100%)      REVIEW OF SYSTEMS:  Limit or unable to obtain secondary to patient's poor mental status.-- but feels ok     On Neurological Examination:  awake alert     Extraocular movements were intact.     Pupils were equal, round, and reactive bilaterally, 3 mm to 2.      Speech says few word        Motor, bilateral upper were 3/5, bilateral lower were in flex position.       No tremors in ext today    Increased tone in all four extremities.    Does follow commands    GENERAL Exam: Nontoxic , No Acute Distress   	  HEENT:  normocephalic, atraumatic  		  LUNGS:  Decreased bilaterally  	  HEART: Normal S1S2   No murmur RRR        	  GI/ ABDOMEN:  Soft  Non tender    EXTREMITIES:   No Edema  No Clubbing  No Cyanosis No Edema    MUSCULOSKELETAL: decreased Range of Motion all 4 extremities   	   SKIN: Normal  No Ecchymosis                  LABS:  CBC Full  -  ( 19 Mar 2018 07:09 )  WBC Count : 12.1 K/uL  Hemoglobin : 11.6 g/dL  Hematocrit : 36.2 %  Platelet Count - Automated : 138 K/uL  Mean Cell Volume : 99.8 fl  Mean Cell Hemoglobin : 32.0 pg  Mean Cell Hemoglobin Concentration : 32.1 gm/dL  Auto Neutrophil # : 9.6 K/uL  Auto Lymphocyte # : 1.2 K/uL  Auto Monocyte # : 1.2 K/uL  Auto Eosinophil # : 0.0 K/uL  Auto Basophil # : 0.0 K/uL  Auto Neutrophil % : 79.7 %  Auto Lymphocyte % : 10.3 %  Auto Monocyte % : 09.6 %  Auto Eosinophil % : 0.1 %  Auto Basophil % : 0.3 %      03-19    147<H>  |  105  |  43<H>  ----------------------------<  112<H>  2.9<LL>   |  39<H>  |  0.86    Ca    8.9      19 Mar 2018 07:09      Hemoglobin A1C:       Vitamin B12         RADIOLOGY    ANALYSIS AND PLAN:  A 89-year-old episode with change in mental status in regards to lethargy, history of Parkinson's disease, hypothyroidism.  1.	For change in mental status and lethargy most likely secondary to metabolic etiologies, respiratory issues. questionable UTI -- antibiotics as needed   2.	I would recommend monitor oxygen saturation, BiPAP as needed.  3.	For history of Parkinson's.  I will continue the patient on her Sinemet 25/100 two tablets at 9 a.m., 1 p.m., 5 p.m., 9 p.m.; and Sinemet extended release 200/50 half tablet 07:30 a.m. and one tablet at 10 p.m.  4.	For history of hypothyroidism, continue the patient on her Synthroid.  5.	The patient prognosis at present is guarded.  6.	overall more awake today   7.	if possible please avoid HALDOL try low dose zyprexa 2.5 if needed   8.	Michael  today 3/19/18    Thank you for the courtesy of this consultation.    Physical therapy evaluation as tolerated  OOB to chair/ambulation with assistance only if possible.    Greater than 40 minutes spent in direct patient care reviewing  the notes, lab data/ imaging , discussion with multidisciplinary team.

## 2018-03-19 NOTE — PROGRESS NOTE ADULT - SUBJECTIVE AND OBJECTIVE BOX
MOYAJESUS is a 89yFemale , patient examined and chart reviewed.     INTERVAL HPI/ OVERNIGHT EVENTS:  No events. Afebrile.  Confusion.    Past Medical History--  PAST MEDICAL & SURGICAL HISTORY:  History of mitral valve prolapse  Gastric reflux  H/O TIA (transient ischemic attack) and stroke  Hypothyroid  Hypothyroidism, acquired  Parkinson's disease  Accelerated hypertension  History of tonsillectomy  History of cataract surgery  Hx of lumpectomy  History of appendectomy  History of ovarian cystectomy      For details regarding the patient's social history, family history, and other miscellaneous elements, please refer the initial infectious diseases consultation and/or the admitting history and physical examination for this admission.    ROS:  CONSTITUTIONAL:  Negative fever or chills  EYES:  Negative  blurry vision or double vision  CARDIOVASCULAR:  Negative for chest pain or palpitations  RESPIRATORY:  Negative for cough, wheezing, or SOB   GASTROINTESTINAL:  Negative for nausea, vomiting, diarrhea, constipation, or abdominal pain  GENITOURINARY:  Negative frequency, urgency , dysuria or hematuria   NEUROLOGIC:  No headache, dizziness, lightheadedness +confusion,  All other systems were reviewed and are negative     ALLERGIES:  beta blockers (Hives)  penicillin (Unknown)      Current inpatient medications :    ANTIBIOTICS/RELEVANT:  ertapenem  IVPB 500 milliGRAM(s) IV Intermittent every 24 hours  lactobacillus acidophilus 1 Tablet(s) Oral three times a day with meals      ALBUTerol    0.083% 2.5 milliGRAM(s) Nebulizer every 6 hours  aspirin enteric coated 81 milliGRAM(s) Oral daily  buDESOnide   0.5 milliGRAM(s) Respule 0.5 milliGRAM(s) Inhalation two times a day  carbidopa/levodopa  25/100 2 Tablet(s) Oral <User Schedule>  carbidopa/levodopa CR 50/200 1 Tablet(s) Oral <User Schedule>  carbidopa/levodopa CR 50/200 0.5 Tablet(s) Oral <User Schedule>  docusate sodium 100 milliGRAM(s) Oral two times a day  enalapril 5 milliGRAM(s) Oral daily  enoxaparin Injectable 30 milliGRAM(s) SubCutaneous daily  escitalopram 10 milliGRAM(s) Oral daily  levothyroxine 100 MICROGram(s) Oral daily  polyethylene glycol 3350 17 Gram(s) Oral daily PRN  potassium chloride    Tablet ER 40 milliEquivalent(s) Oral every 4 hours  sodium chloride 0.65% Nasal 1 Spray(s) Both Nostrils two times a day      Objective:    03-18 @ 07:01  -  03-19 @ 07:00  --------------------------------------------------------  IN: 870 mL / OUT: 0 mL / NET: 870 mL      T(C): 36.6 (03-19-18 @ 12:25), Max: 37.5 (03-19-18 @ 04:06)  HR: 84 (03-19-18 @ 12:00) (76 - 112)  BP: 124/55 (03-19-18 @ 12:00) (105/57 - 158/70)  RR: 16 (03-19-18 @ 12:00) (15 - 26)  SpO2: 97% (03-19-18 @ 12:00) (91% - 100%)  Wt(kg): --      Physical Exam:  GEN: NAD, sitting in chair  HEENT: normocephalic and atraumatic. EOMI. BEATRICE. Moist mucosa. Clear Posterior pharynx.  NECK: Supple. No carotid bruits.  No lymphadenopathy or thyromegaly.  LUNGS: Clear to auscultation.  HEART: Regular rate and rhythm without murmur.  ABDOMEN: Soft, nontender, and nondistended.  Positive bowel sounds.  No hepatosplenomegaly was noted.  EXTREMITIES: Without any cyanosis, clubbing, rash, lesions or edema.  NEUROLOGIC: confused Dementia  SKIN: No ulceration or induration present.      LABS:                        11.6   12.1  )-----------( 138      ( 19 Mar 2018 07:09 )             36.2       03-19    147<H>  |  105  |  43<H>  ----------------------------<  112<H>  2.9<LL>   |  39<H>  |  0.86    Ca    8.9      19 Mar 2018 07:09    Assessment:  89 YO F resident of [facility] assisted living facility PMH of HTN CVA MVP Parkinson's   disease admitted with SOB sec hMPV bronchitis with pneumonitis  Rasheed pleural effusions  CHF  Poss UTI  hx ESBL  Orophyrangeal dysphagia    Plan :   Cont Ertapenam day 4/5  Increase activity  Aspiration precautions        Continue with present regiment.  Appropriate use of antibiotics and adverse effects reviewed.    I have discussed the above plan of care with patient/ family in detail. They expressed understanding of the the treatment plan . Risks, benefits and alternatives discussed in detail. I have asked if they have any questions or concerns and appropriately addressed them to the best of my ability.      Critical care time greater then 35 minutes reviewing notes, labs data/ imaging , discussion with multidisciplinary team.    Thank you for allowing me to participate in care of your patient .        Santa Chavez MD  Infectious Disease  [phone]

## 2018-03-19 NOTE — PROGRESS NOTE ADULT - SUBJECTIVE AND OBJECTIVE BOX
Patient is a 89y Female with a known history of :  Chronic diastolic congestive heart failure (I50.32)  Aortic valve stenosis, etiology of cardiac valve disease unspecified (I35.0)  Dementia with behavioral disturbance, unspecified dementia type (F03.91)  Agitation (R45.1)  Aortic stenosis (I35.0)  Human metapneumovirus (hMPV) pneumonia (J12.3)  Hypothyroidism, unspecified type (E03.9)  Parkinson's disease (G20)  Essential hypertension (I10)  Pleural effusion (J90)  Respiratory distress (R06.03)    HPI:  89 yr ol resident of [facility] assisted living facility, with PMH of Accelerated hypertension  Gastric reflux H/O TIA (transient ischemic attack) and stroke  History of mitral valve prolapse  Hypothyroid  Parkinson's disease  was having SOB, and was found to be hypoxic in facility and sent to hospital, In Ed pt is sob, oxygenating well, but markedly congested , altered mental state and had elevated D Dimer had CT angio has pleural effusion left more than rt.  . Unremarkable noncontrast CT scan of the brain.     Likely having CHf acute TTE ordered, cardio consult called, had b/l pleural effusion, pulmonary consult called, mild increase in LFT.  [facility] has many pts with hMPV infection , will try to out viral illness,  pt denies any bowel or urinary complaint, no fever (15 Mar 2018 15:43)        MEDICATIONS  (STANDING):  ALBUTerol    0.083% 2.5 milliGRAM(s) Nebulizer every 6 hours  aspirin enteric coated 81 milliGRAM(s) Oral daily  buDESOnide   0.5 milliGRAM(s) Respule 0.5 milliGRAM(s) Inhalation two times a day  carbidopa/levodopa  25/100 2 Tablet(s) Oral <User Schedule>  carbidopa/levodopa CR 50/200 1 Tablet(s) Oral <User Schedule>  carbidopa/levodopa CR 50/200 0.5 Tablet(s) Oral <User Schedule>  docusate sodium 100 milliGRAM(s) Oral two times a day  enalapril 5 milliGRAM(s) Oral daily  enoxaparin Injectable 30 milliGRAM(s) SubCutaneous daily  ertapenem  IVPB 500 milliGRAM(s) IV Intermittent every 24 hours  escitalopram 10 milliGRAM(s) Oral daily  lactobacillus acidophilus 1 Tablet(s) Oral three times a day with meals  levothyroxine 100 MICROGram(s) Oral daily  sodium chloride 0.65% Nasal 1 Spray(s) Both Nostrils two times a day    MEDICATIONS  (PRN):  polyethylene glycol 3350 17 Gram(s) Oral daily PRN Constipation      ALLERGIES: beta blockers (Hives)  penicillin (Unknown)      FAMILY HISTORY:      Social history:  Alochol:   Smoking:   Drug Use:   Marital Status:     PHYSICAL EXAMINATION:  -----------------------------  T(C): 36.2 (03-19-18 @ 08:49), Max: 37.5 (03-19-18 @ 04:06)  HR: 78 (03-19-18 @ 08:41) (76 - 112)  BP: 158/70 (03-19-18 @ 04:20) (111/58 - 158/70)  RR: 16 (03-19-18 @ 08:00) (15 - 23)  SpO2: 98% (03-19-18 @ 08:41) (91% - 100%)  Wt(kg): --    03-18 @ 07:01  -  03-19 @ 07:00  --------------------------------------------------------  IN:    IV PiggyBack: 50 mL    Oral Fluid: 820 mL  Total IN: 870 mL    OUT:  Total OUT: 0 mL    Total NET: 870 mL            Constitutional:asleep   Neck: normal jugular venous A waves present, normal jugular venous V waves present and no jugular venous alonzo A waves.   Pulmonary: fair air flow   Cardiovascular: heart rate and rhythm were normal, normal S1 and S2 distant  Musculoskeletal: the gait could not be assessed..       LABS:   --------  CBC Full  -  ( 19 Mar 2018 07:09 )  WBC Count : 12.1 K/uL  Hemoglobin : 11.6 g/dL  Hematocrit : 36.2 %  Platelet Count - Automated : 138 K/uL  Mean Cell Volume : 99.8 fl  Mean Cell Hemoglobin : 32.0 pg  Mean Cell Hemoglobin Concentration : 32.1 gm/dL  Auto Neutrophil # : 9.6 K/uL  Auto Lymphocyte # : 1.2 K/uL  Auto Monocyte # : 1.2 K/uL  Auto Eosinophil # : 0.0 K/uL  Auto Basophil # : 0.0 K/uL  Auto Neutrophil % : 79.7 %  Auto Lymphocyte % : 10.3 %  Auto Monocyte % : 09.6 %  Auto Eosinophil % : 0.1 %  Auto Basophil % : 0.3 %      03-19    147<H>  |  105  |  43<H>  ----------------------------<  112<H>  2.9<LL>   |  39<H>  |  0.86    Ca    8.9      19 Mar 2018 07:09           3/19/2018:  In ICU  Tele = ASIM HUIZAR supplemants            Radiology:

## 2018-03-19 NOTE — PROGRESS NOTE ADULT - ASSESSMENT
89 yr ol resident of [facility] living facility, with PMH of Accelerated hypertension  Gastric reflux H/O TIA (transient ischemic attack) and stroke  History of mitral valve prolapse  Hypothyroid  Parkinson's disease  was having SOB, and was found to be hypoxic in facility and sent to hospital, In Ed pt is sob, oxygenating well, but markedly congested , altered mental state and had elevated D Dimer had CT angio has pleural effusion left more than rt.  . Unremarkable noncontrast CT scan of the brain.     suspected CHf acute TTE ordered, cardio consult called, had b/l pleural effusion, pulmonary consult called, mild increase in LFT.  [facility] has many pts with hMPV infection , will try to out viral illness,  pt denies any bowel or urinary complaint, no fever   recently was in ed for head trauma,and was treated with abx for uti recently  admitted with acute respiratory distress with b/l pleural effusion with possible chf with H/o HTN with MVP, PE ruled out  has hMPV virus pneumonia with left interstitiai, secondary bacterial PNA with severe aortic stenosis, B/l pleural effusion left >rt, with moderate pulmonary HTN with diastolic dysfunction, with possible uti e coli ESBl in urine, started on  levofloxacin in ED,had  ID consult,and abx changed to ertapanem  swallowing assesement, out of bed,BIPAP as needed as per pulmonary, has soft abdominal distension non tender had  US abd no abnormality except cholelithiasis asymptomatic    d/w grandson who is ID physician, at [phone]  d/w with son ROberttoday- pt off oxygen, on  ertapanem for ESBL uti, orophryngeal dysphagia on puree diet with thikned liquids  increase activity PT eval  had mild agitaion at night required haldol, calm at present, advised by neuro zyprexa if needed for agitation,  diuresis as needed for CHF,parkinsons stable with tt as advised by neuro, dementia with behav disorder stable with supportive care, will cont levothyroxin for hypothyroidism  patient generalyy better,   patient planned for thoracentesis as per pulmonary

## 2018-03-20 LAB
ANION GAP SERPL CALC-SCNC: 3 MMOL/L — LOW (ref 5–17)
BUN SERPL-MCNC: 35 MG/DL — HIGH (ref 7–23)
CALCIUM SERPL-MCNC: 8.4 MG/DL — SIGNIFICANT CHANGE UP (ref 8.4–10.5)
CHLORIDE SERPL-SCNC: 108 MMOL/L — SIGNIFICANT CHANGE UP (ref 96–108)
CO2 SERPL-SCNC: 35 MMOL/L — HIGH (ref 22–31)
CREAT SERPL-MCNC: 0.71 MG/DL — SIGNIFICANT CHANGE UP (ref 0.5–1.3)
CULTURE RESULTS: SIGNIFICANT CHANGE UP
CULTURE RESULTS: SIGNIFICANT CHANGE UP
GLUCOSE SERPL-MCNC: 101 MG/DL — HIGH (ref 70–99)
HCT VFR BLD CALC: 36 % — SIGNIFICANT CHANGE UP (ref 34.5–45)
HGB BLD-MCNC: 11.8 G/DL — SIGNIFICANT CHANGE UP (ref 11.5–15.5)
MCHC RBC-ENTMCNC: 32 PG — SIGNIFICANT CHANGE UP (ref 27–34)
MCHC RBC-ENTMCNC: 32.8 GM/DL — SIGNIFICANT CHANGE UP (ref 32–36)
MCV RBC AUTO: 97.5 FL — SIGNIFICANT CHANGE UP (ref 80–100)
PLATELET # BLD AUTO: 117 K/UL — LOW (ref 150–400)
POTASSIUM SERPL-MCNC: 3.9 MMOL/L — SIGNIFICANT CHANGE UP (ref 3.5–5.3)
POTASSIUM SERPL-SCNC: 3.9 MMOL/L — SIGNIFICANT CHANGE UP (ref 3.5–5.3)
RBC # BLD: 3.69 M/UL — LOW (ref 3.8–5.2)
RBC # FLD: 15 % — HIGH (ref 10.3–14.5)
SODIUM SERPL-SCNC: 146 MMOL/L — HIGH (ref 135–145)
SPECIMEN SOURCE: SIGNIFICANT CHANGE UP
SPECIMEN SOURCE: SIGNIFICANT CHANGE UP
WBC # BLD: 8.7 K/UL — SIGNIFICANT CHANGE UP (ref 3.8–10.5)
WBC # FLD AUTO: 8.7 K/UL — SIGNIFICANT CHANGE UP (ref 3.8–10.5)

## 2018-03-20 RX ADMIN — CARBIDOPA AND LEVODOPA 2 TABLET(S): 25; 100 TABLET ORAL at 21:26

## 2018-03-20 RX ADMIN — CARBIDOPA AND LEVODOPA 2 TABLET(S): 25; 100 TABLET ORAL at 12:18

## 2018-03-20 RX ADMIN — ENOXAPARIN SODIUM 30 MILLIGRAM(S): 100 INJECTION SUBCUTANEOUS at 12:18

## 2018-03-20 RX ADMIN — Medication 5 MILLIGRAM(S): at 06:26

## 2018-03-20 RX ADMIN — ERTAPENEM SODIUM 100 MILLIGRAM(S): 1 INJECTION, POWDER, LYOPHILIZED, FOR SOLUTION INTRAMUSCULAR; INTRAVENOUS at 17:10

## 2018-03-20 RX ADMIN — ESCITALOPRAM OXALATE 10 MILLIGRAM(S): 10 TABLET, FILM COATED ORAL at 12:18

## 2018-03-20 RX ADMIN — Medication 1 TABLET(S): at 12:19

## 2018-03-20 RX ADMIN — ALBUTEROL 2.5 MILLIGRAM(S): 90 AEROSOL, METERED ORAL at 08:03

## 2018-03-20 RX ADMIN — Medication 1 SPRAY(S): at 06:26

## 2018-03-20 RX ADMIN — Medication 100 MILLIGRAM(S): at 06:25

## 2018-03-20 RX ADMIN — CARBIDOPA AND LEVODOPA 1 TABLET(S): 25; 100 TABLET ORAL at 21:26

## 2018-03-20 RX ADMIN — Medication 81 MILLIGRAM(S): at 12:18

## 2018-03-20 RX ADMIN — CARBIDOPA AND LEVODOPA 2 TABLET(S): 25; 100 TABLET ORAL at 10:26

## 2018-03-20 RX ADMIN — Medication 100 MILLIGRAM(S): at 17:10

## 2018-03-20 RX ADMIN — ALBUTEROL 2.5 MILLIGRAM(S): 90 AEROSOL, METERED ORAL at 02:04

## 2018-03-20 RX ADMIN — Medication 1 TABLET(S): at 08:36

## 2018-03-20 RX ADMIN — Medication 0.5 MILLIGRAM(S): at 08:03

## 2018-03-20 RX ADMIN — Medication 1 TABLET(S): at 17:10

## 2018-03-20 RX ADMIN — Medication 100 MICROGRAM(S): at 06:26

## 2018-03-20 RX ADMIN — Medication 1 SPRAY(S): at 17:10

## 2018-03-20 RX ADMIN — CARBIDOPA AND LEVODOPA 0.5 TABLET(S): 25; 100 TABLET ORAL at 08:36

## 2018-03-20 RX ADMIN — CARBIDOPA AND LEVODOPA 2 TABLET(S): 25; 100 TABLET ORAL at 17:10

## 2018-03-20 NOTE — PROGRESS NOTE ADULT - ASSESSMENT
89y/o w/f seen at [facility] ICU.  History dementia, pleural effusions, thyroid disease, HTN, parkinson's disease, GERD, CVA.  Admitted with respiratory distress and positive rapid RVP.    3/20/18  Patient sleeping comfortably in chair.  Monitor compatible with NSR, occasional APC.  Echocardiogram compatible with severe AS and moderate Pulmonary HTN (see above report).    Plan:  - Continue present therapy.  - Being followed by Pulmonary, ID and Neurology.

## 2018-03-20 NOTE — PROGRESS NOTE ADULT - SUBJECTIVE AND OBJECTIVE BOX
JESUS MOYA is a 89yFemale , patient examined and chart reviewed.     INTERVAL HPI/ OVERNIGHT EVENTS:  Afebrile.  No events.    Past Medical History--  PAST MEDICAL & SURGICAL HISTORY:  History of mitral valve prolapse  Gastric reflux  H/O TIA (transient ischemic attack) and stroke  Hypothyroid  Hypothyroidism, acquired  Parkinson's disease  Accelerated hypertension  History of tonsillectomy  History of cataract surgery  Hx of lumpectomy  History of appendectomy  History of ovarian cystectomy      For details regarding the patient's social history, family history, and other miscellaneous elements, please refer the initial infectious diseases consultation and/or the admitting history and physical examination for this admission.      ROS:  Unable to obtain due to : Confusion    ALLERGIES:  beta blockers (Hives)  penicillin (Unknown)      Current inpatient medications :    ANTIBIOTICS/RELEVANT:  ertapenem  IVPB 500 milliGRAM(s) IV Intermittent every 24 hours  lactobacillus acidophilus 1 Tablet(s) Oral three times a day with meals      aspirin enteric coated 81 milliGRAM(s) Oral daily  carbidopa/levodopa  25/100 2 Tablet(s) Oral <User Schedule>  carbidopa/levodopa CR 50/200 1 Tablet(s) Oral <User Schedule>  carbidopa/levodopa CR 50/200 0.5 Tablet(s) Oral <User Schedule>  docusate sodium 100 milliGRAM(s) Oral two times a day  enalapril 5 milliGRAM(s) Oral daily  enoxaparin Injectable 30 milliGRAM(s) SubCutaneous daily  escitalopram 10 milliGRAM(s) Oral daily  levothyroxine 100 MICROGram(s) Oral daily  polyethylene glycol 3350 17 Gram(s) Oral daily PRN  sodium chloride 0.65% Nasal 1 Spray(s) Both Nostrils two times a day      Objective:    03-19 @ 07:01  -  03-20 @ 07:00  --------------------------------------------------------  IN: 610 mL / OUT: 0 mL / NET: 610 mL      T(C): 36.6 (03-20-18 @ 12:17), Max: 36.9 (03-19-18 @ 20:05)  HR: 88 (03-20-18 @ 10:00) (70 - 107)  BP: 93/38 (03-20-18 @ 10:00) (93/38 - 154/82)  RR: 21 (03-20-18 @ 10:00) (14 - 21)  SpO2: 95% (03-20-18 @ 10:00) (93% - 100%)  Wt(kg): --      Physical Exam:  GEN: NAD, Confused  HEENT: normocephalic and atraumatic. EOMI. BEATRICE. Moist mucosa. Clear Posterior pharynx.  NECK: Supple. No carotid bruits.  No lymphadenopathy or thyromegaly.  LUNGS: Clear to auscultation.  HEART: Regular rate and rhythm without murmur.  ABDOMEN: Soft, nontender, and nondistended.  Positive bowel sounds.  No hepatosplenomegaly was noted.  EXTREMITIES: Without any cyanosis, clubbing, rash, lesions or edema.  NEUROLOGIC: Confused Dementia  SKIN: No ulceration or induration present.      LABS:                        11.8   8.7   )-----------( 117      ( 20 Mar 2018 06:58 )             36.0       03-20    146<H>  |  108  |  35<H>  ----------------------------<  101<H>  3.9   |  35<H>  |  0.71    Ca    8.4      20 Mar 2018 06:58    Assessment :  89 YO F resident of [facility] assisted living facility PMH of HTN CVA MVP Parkinson's   disease admitted with SOB sec hMPV bronchitis with pneumonitis  Rasheed pleural effusions  CHF  Poss UTI  hx ESBL  Orophyrangeal dysphagia    Plan :   Cont Ertapenam day 5/7  Increase activity  Aspiration precautions    Continue with present regiment.  Appropriate use of antibiotics and adverse effects reviewed.    Critical care time greater then 35 minutes reviewing notes, labs data/ imaging , discussion with multidisciplinary team.    Thank you for allowing me to participate in care of your patient .        Santa Chavez MD  Infectious Disease  [phone]

## 2018-03-20 NOTE — PROGRESS NOTE ADULT - SUBJECTIVE AND OBJECTIVE BOX
Patient is a 89y old  Female who presents with a chief complaint of sob, Hypoxia (15 Mar 2018 15:43)      INTERVAL HPI/OVERNIGHT EVENTS:no acute event    Home Medications:  acetaminophen-diphenhydramine 500 mg-25 mg oral capsule:  (03 Dec 2017 18:30)  aspirin 81 mg oral delayed release capsule:  orally once a day (03 Dec 2017 18:30)  carbidopa-levodopa 25 mg-100 mg oral tablet: 2  orally 4 times a day (03 Dec 2017 18:30)  carbidopa-levodopa 50 mg-200 mg oral tablet, extended release: 1 tab(s) orally every 6 hours (03 Dec 2017 18:30)  Centrum oral tablet, chewable: 1 tab(s) orally once a day (03 Dec 2017 18:30)  Colace 100 mg oral capsule:  orally 3 times a day (03 Dec 2017 18:00)  docusate potassium 100 mg oral capsule:  (03 Dec 2017 18:30)  enalapril 5 mg oral tablet: 1 tab(s) orally once a day (03 Dec 2017 18:30)  ergocalciferol 50,000 intl units (1.25 mg) oral capsule: 1 cap(s) orally every other week (03 Dec 2017 18:30)  Flonase 50 mcg/inh nasal spray: 1 spray(s) nasal once a day (03 Dec 2017 18:30)  levothyroxine 100 mcg (0.1 mg) oral tablet: 1 tab(s) orally once a day (03 Dec 2017 18:30)  Lexapro 10 mg oral tablet: 1 tab(s) orally once a day (03 Dec 2017 18:30)  loratadine 10 mg oral tablet: 1 tab(s) orally once a day (03 Dec 2017 18:30)  Milk of Magnesia:  (03 Dec 2017 18:30)  MiraLax oral powder for reconstitution:  (03 Dec 2017 18:30)  Ocuvite Lutein oral capsule: 1 cap(s) orally 2 times a day (03 Dec 2017 18:00)  omeprazole 20 mg oral delayed release capsule:  orally 2 times a day (03 Dec 2017 18:00)  polyethylene glycol 3350 oral powder for reconstitution:  orally every 3 days (03 Dec 2017 18:00)  senna 8.6 mg oral tablet: 2  orally once a day (at bedtime) (03 Dec 2017 18:30)  Senna 8.6 mg oral tablet: 1 tab(s) orally once a day (at bedtime) (03 Dec 2017 18:30)  Systane ophthalmic solution: 1 drop(s) to each affected eye 2 times a day (03 Dec 2017 18:00)  Systane ophthalmic solution: 1 drop(s) to each affected eye 2 times a day (03 Dec 2017 18:30)  zinc oxide topical ointment:  (03 Dec 2017 18:30)      MEDICATIONS  (STANDING):  aspirin enteric coated 81 milliGRAM(s) Oral daily  carbidopa/levodopa  25/100 2 Tablet(s) Oral <User Schedule>  carbidopa/levodopa CR 50/200 1 Tablet(s) Oral <User Schedule>  carbidopa/levodopa CR 50/200 0.5 Tablet(s) Oral <User Schedule>  docusate sodium 100 milliGRAM(s) Oral two times a day  enalapril 5 milliGRAM(s) Oral daily  enoxaparin Injectable 30 milliGRAM(s) SubCutaneous daily  ertapenem  IVPB 500 milliGRAM(s) IV Intermittent every 24 hours  escitalopram 10 milliGRAM(s) Oral daily  lactobacillus acidophilus 1 Tablet(s) Oral three times a day with meals  levothyroxine 100 MICROGram(s) Oral daily  sodium chloride 0.65% Nasal 1 Spray(s) Both Nostrils two times a day    MEDICATIONS  (PRN):  polyethylene glycol 3350 17 Gram(s) Oral daily PRN Constipation      Allergies    beta blockers (Hives)  penicillin (Unknown)    Intolerances        REVIEW OF SYSTEMS:  unable as pt confused, denies any pain or SOB, wants icecream  Vital Signs Last 24 Hrs  T(C): 36.6 (20 Mar 2018 12:17), Max: 36.9 (19 Mar 2018 20:05)  T(F): 97.8 (20 Mar 2018 12:17), Max: 98.5 (19 Mar 2018 20:05)  HR: 88 (20 Mar 2018 10:00) (70 - 107)  BP: 93/38 (20 Mar 2018 10:00) (93/38 - 154/82)  BP(mean): 56 (20 Mar 2018 10:00) (56 - 107)  RR: 21 (20 Mar 2018 10:00) (14 - 21)  SpO2: 95% (20 Mar 2018 10:00) (93% - 100%)    PHYSICAL EXAM:  GENERAL: frail and lethargic  HEAD:  Atraumatic, Normocephalic  EYES: EOMI, PERRLA, conjunctiva and sclera clear  ENMT: Moist mucous membranes,   NECK: Supple, No JVD, Normal thyroid  NERVOUS SYSTEM:  Alert & Oriented X1, Motor Strength 3/5 B/L upper and lower extremities; DTRs 2+ intact and symmetric, 2 person assist to transfer  CHEST/LUNG: BS decreased at bases  HEART: Regular rate and rhythm; No murmurs, rubs, or gallops  ABDOMEN: Soft, Nontender, Nondistended; Bowel sounds present  EXTREMITIES:  2+ Peripheral Pulses, No clubbing, cyanosis, or edema  LYMPH: No lymphadenopathy noted  SKIN: No rashes or lesions    LABS:                        11.8   8.7   )-----------( 117      ( 20 Mar 2018 06:58 )             36.0     03-20    146<H>  |  108  |  35<H>  ----------------------------<  101<H>  3.9   |  35<H>  |  0.71    Ca    8.4      20 Mar 2018 06:58          CAPILLARY BLOOD GLUCOSE            Culture - Urine (collected 03-15-18 @ 21:40)  Source: .Urine Catheterized  Final Report (03-17-18 @ 17:20):    >100,000 CFU/ml Escherichia coli ESBL  Organism: Escherichia coli ESBL (03-17-18 @ 17:20)  Organism: Escherichia coli ESBL (03-17-18 @ 17:20)      -  Amikacin: S <=8      -  Ampicillin: R >16      -  Ampicillin/Sulbactam: R <=4/2      -  Aztreonam: R >16      -  Cefazolin: R >16      -  Cefepime: R >16      -  Cefoxitin: S 8      -  Ceftazidime: R 4      -  Ceftriaxone: R >32      -  Ciprofloxacin: R >2      -  Ertapenem: S <=0.5      -  Gentamicin: S <=1      -  Imipenem: S <=1      -  Levofloxacin: R >4      -  Meropenem: S <=1      -  Nitrofurantoin: S <=32      -  Piperacillin/Tazobactam: R <=8      -  Tobramycin: S <=2      -  Trimethoprim/Sulfamethoxazole: R >2/38      Method Type: ANN    Culture - Blood (collected 03-15-18 @ 21:36)  Source: .Blood Blood  Preliminary Report (03-16-18 @ 22:01):    No growth to date.    Culture - Blood (collected 03-15-18 @ 21:36)  Source: .Blood Blood  Preliminary Report (03-16-18 @ 22:01):    No growth to date.        I&O's Summary    19 Mar 2018 07:01  -  20 Mar 2018 07:00  --------------------------------------------------------  IN: 610 mL / OUT: 0 mL / NET: 610 mL        RADIOLOGY & ADDITIONAL TESTS:    Imaging Personally Reviewed:  [x ] YES  [ ] NO    Consultant(s) Notes Reviewed:  [ x] YES  [ ] NO    Care Discussed with Consultants/Other Providers [x ] YES  [ ] NO

## 2018-03-20 NOTE — PROGRESS NOTE ADULT - ASSESSMENT
89 yr ol resident of [facility], with PMH of Accelerated hypertension  Gastric reflux H/O TIA (transient ischemic attack) and stroke  History of mitral valve prolapse  Hypothyroid  Parkinson's disease  was having SOB, and was found to be hypoxic in facility and sent to hospital, In Ed pt is sob, oxygenating well, but markedly congested , altered mental state and had elevated D Dimer had CT angio has pleural effusion left more than rt.      Suspected CHf acute TTE ordered, cardio consult called, had b/l pleural effusion,        admitted with acute respiratory distress with b/l pleural effusion with possible chf with H/o HTN with MVP, PE ruled out  has hMPV virus pneumonia with left interstitiai, secondary bacterial PNA with severe aortic stenosis, B/l pleural effusion left >rt, with moderate pulmonary HTN with diastolic dysfunction, with possible uti e coli ESBl in urine, started on  levofloxacin in ED,had  ID consult,and abx changed to ertapanem  swallowing assesement, out of bed,BIPAP as needed as per pulmonary, has soft abdominal distension non tender had  US abd no abnormality except cholelithiasis asymptomatic    d   patient planned for thoracentesis as per pulmonary

## 2018-03-20 NOTE — PROVIDER CONTACT NOTE (CHANGE IN STATUS NOTIFICATION) - ASSESSMENT
Patient is  89yo bedbound female presenting with deep tissue pressure injury of the right ischium 1 x 1.2 periwound is non-blanchable erythema scar tissue at coccyx. Goals of care discussed with family and requested no extraordinary measures. Patients injuries are intact prevention measures in place

## 2018-03-20 NOTE — PROGRESS NOTE ADULT - SUBJECTIVE AND OBJECTIVE BOX
PULMONARY/CRITICAL CARE      INTERVAL HPI/OVERNIGHT EVENTS: Pt. stable, nad. O2 sat 95 ra. No fever.    89y FemaleHPI:  89 yr ol resident of [facility] assisted living facility, with PMH of Accelerated hypertension  Gastric reflux H/O TIA (transient ischemic attack) and stroke  History of mitral valve prolapse  Hypothyroid  Parkinson's disease  was having SOB, and was found to be hypoxic in facility and sent to hospital, In Ed pt is sob, oxygenating well, but markedly congested , altered mental state and had elevated D Dimer had CT angio has pleural effusion left more than rt.  . Unremarkable noncontrast CT scan of the brain.     Likely having CHf acute TTE ordered, cardio consult called, had b/l pleural effusion, pulmonary consult called, mild increase in LFT.  [facility] has many pts with hMPV infection , will try to out viral illness,  pt denies any bowel or urinary complaint, no fever (15 Mar 2018 15:43)        PAST MEDICAL & SURGICAL HISTORY:  History of mitral valve prolapse  Gastric reflux  H/O TIA (transient ischemic attack) and stroke  Hypothyroid  Hypothyroidism, acquired  Parkinson's disease  Accelerated hypertension  History of tonsillectomy  History of cataract surgery  Hx of lumpectomy  History of appendectomy  History of ovarian cystectomy        ICU Vital Signs Last 24 Hrs  T(C): 36.6 (20 Mar 2018 04:14), Max: 36.9 (19 Mar 2018 20:05)  T(F): 97.9 (20 Mar 2018 04:14), Max: 98.5 (19 Mar 2018 20:05)  HR: 70 (20 Mar 2018 04:00) (70 - 107)  BP: 136/71 (20 Mar 2018 04:00) (95/78 - 151/77)  BP(mean): 90 (20 Mar 2018 04:00) (72 - 107)  ABP: --  ABP(mean): --  RR: 18 (20 Mar 2018 04:00) (14 - 26)  SpO2: 94% (20 Mar 2018 04:00) (93% - 98%)    Qtts:     I&O's Summary    18 Mar 2018 07:01  -  19 Mar 2018 07:00  --------------------------------------------------------  IN: 870 mL / OUT: 0 mL / NET: 870 mL    19 Mar 2018 07:01  -  20 Mar 2018 06:10  --------------------------------------------------------  IN: 610 mL / OUT: 0 mL / NET: 610 mL            REVIEW OF SYSTEMS:    CONSTITUTIONAL: No fever, weight loss, or fatigue  EYES: No eye pain, visual disturbances, or discharge  ENMT:  No difficulty hearing, tinnitus, vertigo; No sinus or throat pain  NECK: No pain or stiffness  BREASTS: No pain, masses, or nipple discharge  RESPIRATORY: No cough, wheezing, chills or hemoptysis; No shortness of breath  CARDIOVASCULAR: No chest pain, palpitations, dizziness, or leg swelling  GASTROINTESTINAL: No abdominal or epigastric pain. No nausea, vomiting, or hematemesis; No diarrhea or constipation. No melena or hematochezia.  GENITOURINARY: No dysuria, frequency, hematuria, or incontinence  NEUROLOGICAL: No headaches, , numbness, s  SKIN: No itching, burning, rashes, or lesions   LYMPH NODES: No enlarged glands  ENDOCRINE: No heat or cold intolerance; No hair loss  MUSCULOSKELETAL: No joint pain or swelling; No muscle, back, or extremity pain, no calf tenderness  PSYCHIATRIC: No depression, anxiety, mood swings, or difficulty sleeping  HEME/LYMPH: No easy bruising, or bleeding gums  ALLERGY AND IMMUNOLOGIC: No hives or eczema      PHYSICAL EXAM:    GENERAL: NAD, well-groomed, well-developed, NAD  HEAD:  Atraumatic, Normocephalic  EYES: EOMI, PERRLA, conjunctiva and sclera clear  ENMT: No tonsillar erythema, exudates, or enlargement; Moist mucous membranes, Good dentition, No lesions  NECK: Supple, No JVD, Normal thyroid  NERVOUS SYSTEM:  non verbal, somewhat alert,  not moving ext much  CHEST/LUNG: Clear to percussion bilaterally; No rales, rhonchi, wheezing, or rubs Decreased bs bases  HEART: Regular rate and rhythm; No murmurs, rubs, or gallops  ABDOMEN: Soft, Nontender, Nondistended; Bowel sounds present  EXTREMITIES:  2+ Peripheral Pulses, No clubbing, cyanosis, or edema  Chronic changes legs.  LYMPH: No lymphadenopathy noted  SKIN: No rashes or lesions        LABS:                        11.6   12.1  )-----------( 138      ( 19 Mar 2018 07:09 )             36.2     03-19    145  |  106  |  45<H>  ----------------------------<  96  4.3   |  34<H>  |  0.77    Ca    8.6      19 Mar 2018 18:19            vanco through     RADIOLOGY & ADDITIONAL STUDIES:      CRITICAL CARE TIME SPENT:

## 2018-03-20 NOTE — PROGRESS NOTE ADULT - ASSESSMENT
89 yr ol resident of [facility] assisted living facility, with PMH of Accelerated hypertension  Gastric reflux H/O TIA (transient ischemic attack) and stroke  History of mitral valve prolapse  Hypothyroid  Parkinson's disease  was having SOB, and was found to be hypoxic in facility and sent to hospital, In Ed pt is sob, oxygenating well, but markedly congested , altered mental state and had elevated D Dimer had CT angio has pleural effusion left more than rt.  . Unremarkable noncontrast CT scan of the brain.     suspected CHf acute TTE ordered, cardio consult called, had b/l pleural effusion, pulmonary consult called, mild increase in LFT.  [facility] has many pts with hMPV infection , will try to out viral illness,  pt denies any bowel or urinary complaint, no fever   recently was in ed for head trauma,and was treated with abx for uti recently  admitted with acute respiratory distress with b/l pleural effusion with possible chf with H/o HTN with MVP, PE ruled out  has hMPV virus pneumonia with left interstitiai, secondary bacterial PNA with severe aortic stenosis, B/l pleural effusion left >rt, with moderate pulmonary HTN with diastolic dysfunction, with possible uti e coli ESBl in urine, started on  levofloxacin in ED,had  ID consult,and abx changed to ertapanem  swallowing assesement, out of bed,BIPAP as needed as per pulmonary, has soft abdominal distension non tender had  US abd no abnormality except cholelithiasis asymptomatic    d/w grandson who is ID physician, at [phone]  d/w with son ROberttoday- pt off oxygen, on  ertapanem for ESBL uti, orophryngeal dysphagia on puree diet with thikned liquids  increase activity PT eval  had mild agitaion at night required haldol, calm at present, advised by neuro zyprexa if needed for agitation,  diuresis as needed for CHF,parkinsons stable with tt as advised by neuro, dementia with behav disorder stable with supportive care, will cont levothyroxin for hypothyroidism  patient generalyy better,   patient planned for thoracentesis as per pulmonary family refused, ID f up  pt on ertapanem

## 2018-03-20 NOTE — PROGRESS NOTE ADULT - SUBJECTIVE AND OBJECTIVE BOX
Patient is a 89y Female with a known history of :  Chronic diastolic congestive heart failure (I50.32)  Aortic valve stenosis, etiology of cardiac valve disease unspecified (I35.0)  Dementia with behavioral disturbance, unspecified dementia type (F03.91)  Agitation (R45.1)  Aortic stenosis (I35.0)  Human metapneumovirus (hMPV) pneumonia (J12.3)  Hypothyroidism, unspecified type (E03.9)  Parkinson's disease (G20)  Essential hypertension (I10)  Pleural effusion (J90)  Respiratory distress (R06.03)    HPI:  89 yr ol resident of [facility] assisted living facility, with PMH of Accelerated hypertension  Gastric reflux H/O TIA (transient ischemic attack) and stroke  History of mitral valve prolapse  Hypothyroid  Parkinson's disease  was having SOB, and was found to be hypoxic in facility and sent to hospital, In Ed pt is sob, oxygenating well, but markedly congested , altered mental state and had elevated D Dimer had CT angio has pleural effusion left more than rt.  . Unremarkable noncontrast CT scan of the brain.     Likely having CHf acute TTE ordered, cardio consult called, had b/l pleural effusion, pulmonary consult called, mild increase in LFT.  [facility] has many pts with hMPV infection , will try to out viral illness,  pt denies any bowel or urinary complaint, no fever (15 Mar 2018 15:43)      REVIEW OF SYSTEMS:    CONSTITUTIONAL: No fever, weight loss, or fatigue  EYES: No eye pain, visual disturbances, or discharge  ENMT:  No difficulty hearing, tinnitus, vertigo; No sinus or throat pain  NECK: No pain or stiffness  BREASTS: No pain, masses, or nipple discharge  RESPIRATORY: No cough, wheezing, chills or hemoptysis; No shortness of breath  CARDIOVASCULAR: No chest pain, palpitations, dizziness, or leg swelling  GASTROINTESTINAL: No abdominal or epigastric pain. No nausea, vomiting, or hematemesis; No diarrhea or constipation. No melena or hematochezia.  GENITOURINARY: No dysuria, frequency, hematuria, or incontinence  NEUROLOGICAL: No headaches, memory loss, loss of strength, numbness, or tremors  SKIN: No itching, burning, rashes, or lesions   LYMPH NODES: No enlarged glands  ENDOCRINE: No heat or cold intolerance; No hair loss  MUSCULOSKELETAL: No joint pain or swelling; No muscle, back, or extremity pain  PSYCHIATRIC: No depression, anxiety, mood swings, or difficulty sleeping  HEME/LYMPH: No easy bruising, or bleeding gums  ALLERGY AND IMMUNOLOGIC: No hives or eczema    MEDICATIONS  (STANDING):  ALBUTerol    0.083% 2.5 milliGRAM(s) Nebulizer every 6 hours  aspirin enteric coated 81 milliGRAM(s) Oral daily  buDESOnide   0.5 milliGRAM(s) Respule 0.5 milliGRAM(s) Inhalation two times a day  carbidopa/levodopa  25/100 2 Tablet(s) Oral <User Schedule>  carbidopa/levodopa CR 50/200 1 Tablet(s) Oral <User Schedule>  carbidopa/levodopa CR 50/200 0.5 Tablet(s) Oral <User Schedule>  docusate sodium 100 milliGRAM(s) Oral two times a day  enalapril 5 milliGRAM(s) Oral daily  enoxaparin Injectable 30 milliGRAM(s) SubCutaneous daily  ertapenem  IVPB 500 milliGRAM(s) IV Intermittent every 24 hours  escitalopram 10 milliGRAM(s) Oral daily  lactobacillus acidophilus 1 Tablet(s) Oral three times a day with meals  levothyroxine 100 MICROGram(s) Oral daily  sodium chloride 0.65% Nasal 1 Spray(s) Both Nostrils two times a day    MEDICATIONS  (PRN):  polyethylene glycol 3350 17 Gram(s) Oral daily PRN Constipation      ALLERGIES: beta blockers (Hives)  penicillin (Unknown)      FAMILY HISTORY:      Social history:  Alochol:   Smoking:   Drug Use:   Marital Status:     PHYSICAL EXAMINATION:  -----------------------------  T(C): 36.6 (03-20-18 @ 04:14), Max: 36.9 (03-19-18 @ 20:05)  HR: 78 (03-20-18 @ 08:04) (70 - 107)  BP: 143/81 (03-20-18 @ 08:00) (95/78 - 154/82)  RR: 15 (03-20-18 @ 08:00) (14 - 26)  SpO2: 99% (03-20-18 @ 08:04) (93% - 100%)  Wt(kg): --    03-19 @ 07:01  -  03-20 @ 07:00  --------------------------------------------------------  IN:    IV PiggyBack: 50 mL    Oral Fluid: 560 mL  Total IN: 610 mL    OUT:  Total OUT: 0 mL    Total NET: 610 mL            Constitutional: well developed, normal appearance, well groomed, well nourished, no deformities and no acute distress.   Eyes: the conjunctiva exhibited no abnormalities and the eyelids demonstrated no xanthelasmas.   HEENT: normal oral mucosa, no oral pallor and no oral cyanosis.   Neck: normal jugular venous A waves present, normal jugular venous V waves present and no jugular venous alonzo A waves.   Pulmonary: no respiratory distress, normal respiratory rhythm and effort, no accessory muscle use and lungs were clear to auscultation bilaterally. Anteriorly  Cardiovascular: distant sounds, heart rate and rhythm were normal with occasional ectopic, decreased S1 and S2 with II/VI systolic murmur  Musculoskeletal: the gait could not be assessed.  Extremities: no clubbing of the fingernails, no localized cyanosis, no petechial hemorrhages and no ischemic changes.   Skin: normal skin color and pigmentation, no rash, no venous stasis, no skin lesions, no skin ulcer and no xanthoma was observed.     LABS:   --------  03-20    146<H>  |  108  |  35<H>  ----------------------------<  101<H>  3.9   |  35<H>  |  0.71    Ca    8.4      20 Mar 2018 06:58                           11.8   8.7   )-----------( 117      ( 20 Mar 2018 06:58 )             36.0                   Radiology:    < from: US Transthoracic Echocardiogram w/Doppler Complete (03.15.18 @ 19:20) >    EXAM:  US TTE W DOPPLER COMPLETE                                  PROCEDURE DATE:  03/15/2018          INTERPRETATION:  Ordering Physician: DADA LOZANO [id number]    Indication: Shortness of breath.    Technician: Lashawn Anand    Study Quality: Fair.  Patient could not be optimally positioned.    Height: 5 feet 4 inches  Weight: 120 pounds  Blood Pressure: 96/55    MEASUREMENTS  IVS: 1.3 cm  PWT: 1.3 cm  LA: 4.1 cm  Aortic Root: 2.4 cm  LVIDd: 2.9 cm  LVIDs: 1.8 cm    LVEF: Approximately 65-70%    FINDINGS  Left Ventricle: Endocardium not well visualized. Grossly normal left   ventricular size and overall systolic function. Moderate concentric left   ventricular hypertrophy. Mild diastolic dysfunction (stage I).  Right Ventricle: Normal right ventricular size and function.  Left Atrium: Mild left atrial enlargement.  Right Atrium: Normal right atrium.  Mitral Valve: Mitral annular calcification, otherwise normal mitral valve.  Aortic Valve: Calcified aortic valve with decreased opening. The peak and   mean transaortic gradients are 98 mmHg and 57 mmHg, respectively, and the   peak velocity is 4.9 m/s, consistent with severe aortic stenosis. The   calculated aortic valve area is approximately 0.4 sq cm.  Tricuspid Valve: Normal tricuspid valve. Mild tricuspid regurgitation.   Pulmonary artery systolic pressure estimated at 30 mmHg, assuming a right   atrial pressure of 3 mmHg, consistent with moderate pulmonary   hypertension.  Pulmonic Valve: Pulmonic valve not well visualized; grossly normal.  Pericardium/Pleura: No pericardial effusion.      CONCLUSIONS:  1. Grossly normal left ventricular size and overall systolic function.   Moderate concentric left ventricular hypertrophy. Mild diastolic   dysfunction (stage I).  2. Mild left atrial enlargement.  3. Severe aortic stenosis.   4. Moderate pulmonary hypertension.                    OT CASTILLO M.D., ATTENDING CARDIOLOGIST  This document has been electronically signed. Mar 16 2018  7:37AM                < end of copied text >

## 2018-03-20 NOTE — PROGRESS NOTE ADULT - SUBJECTIVE AND OBJECTIVE BOX
Neurology follow up note    JESUS [id number]      Interval History:    Patient feels ok no new complaints.    MEDICATIONS    aspirin enteric coated 81 milliGRAM(s) Oral daily  carbidopa/levodopa  25/100 2 Tablet(s) Oral <User Schedule>  carbidopa/levodopa CR 50/200 1 Tablet(s) Oral <User Schedule>  carbidopa/levodopa CR 50/200 0.5 Tablet(s) Oral <User Schedule>  docusate sodium 100 milliGRAM(s) Oral two times a day  enalapril 5 milliGRAM(s) Oral daily  enoxaparin Injectable 30 milliGRAM(s) SubCutaneous daily  ertapenem  IVPB 500 milliGRAM(s) IV Intermittent every 24 hours  escitalopram 10 milliGRAM(s) Oral daily  lactobacillus acidophilus 1 Tablet(s) Oral three times a day with meals  levothyroxine 100 MICROGram(s) Oral daily  polyethylene glycol 3350 17 Gram(s) Oral daily PRN  sodium chloride 0.65% Nasal 1 Spray(s) Both Nostrils two times a day      Allergies    beta blockers (Hives)  penicillin (Unknown)    Intolerances            Vital Signs Last 24 Hrs  T(C): 36.6 (20 Mar 2018 12:17), Max: 36.9 (19 Mar 2018 20:05)  T(F): 97.8 (20 Mar 2018 12:17), Max: 98.5 (19 Mar 2018 20:05)  HR: 88 (20 Mar 2018 10:00) (70 - 107)  BP: 93/38 (20 Mar 2018 10:00) (93/38 - 154/82)  BP(mean): 56 (20 Mar 2018 10:00) (56 - 107)  RR: 21 (20 Mar 2018 10:00) (14 - 21)  SpO2: 95% (20 Mar 2018 10:00) (93% - 100%)      REVIEW OF SYSTEMS:  Limit or unable to obtain secondary to patient's poor mental status.-- but feels ok     On Neurological Examination:  awake alert     Extraocular movements were intact.     Pupils were equal, round, and reactive bilaterally, 3 mm to 2.      Speech says few word        Motor, bilateral upper were 3/5, bilateral lower were in flex position.       No tremors in ext today    Increased tone in all four extremities.    Does follow commands    GENERAL Exam: Nontoxic , No Acute Distress   	  HEENT:  normocephalic, atraumatic  		  LUNGS:  Decreased bilaterally  	  HEART: Normal S1S2   No murmur RRR        	  GI/ ABDOMEN:  Soft  Non tender    EXTREMITIES:   No Edema  No Clubbing  No Cyanosis No Edema    MUSCULOSKELETAL: decreased Range of Motion all 4 extremities   	   SKIN: Normal  No Ecchymosis               LABS:  CBC Full  -  ( 20 Mar 2018 06:58 )  WBC Count : 8.7 K/uL  Hemoglobin : 11.8 g/dL  Hematocrit : 36.0 %  Platelet Count - Automated : 117 K/uL  Mean Cell Volume : 97.5 fl  Mean Cell Hemoglobin : 32.0 pg  Mean Cell Hemoglobin Concentration : 32.8 gm/dL  Auto Neutrophil # : x  Auto Lymphocyte # : x  Auto Monocyte # : x  Auto Eosinophil # : x  Auto Basophil # : x  Auto Neutrophil % : x  Auto Lymphocyte % : x  Auto Monocyte % : x  Auto Eosinophil % : x  Auto Basophil % : x      03-20    146<H>  |  108  |  35<H>  ----------------------------<  101<H>  3.9   |  35<H>  |  0.71    Ca    8.4      20 Mar 2018 06:58      Hemoglobin A1C:       Vitamin B12         RADIOLOGY    ANALYSIS AND PLAN:  A 89-year-old episode with change in mental status in regards to lethargy, history of Parkinson's disease, hypothyroidism.  1.	For change in mental status and lethargy most likely secondary to metabolic etiologies, respiratory issues. questionable UTI -- antibiotics as needed   2.	I would recommend monitor oxygen saturation, BiPAP as needed.  3.	For history of Parkinson's.  I will continue the patient on her Sinemet 25/100 two tablets at 9 a.m., 1 p.m., 5 p.m., 9 p.m.; and Sinemet extended release 200/50 half tablet 07:30 a.m. and one tablet at 10 p.m.  4.	For history of hypothyroidism, continue the patient on her Synthroid.  5.	The patient prognosis at present is guarded.  6.	overall more awake today   7.	monitor oral intake   8.	if possible please avoid HALDOL try low dose zyprexa 2.5 if needed   9.	Michael  today 3/19/18    Thank you for the courtesy of this consultation.    Physical therapy evaluation as tolerated  OOB to chair/ambulation with assistance only if possible.    Greater than 40 minutes spent in direct patient care reviewing  the notes, lab data/ imaging , discussion with multidisciplinary team.

## 2018-03-21 DIAGNOSIS — N39.0 URINARY TRACT INFECTION, SITE NOT SPECIFIED: ICD-10-CM

## 2018-03-21 PROCEDURE — 71045 X-RAY EXAM CHEST 1 VIEW: CPT | Mod: 26

## 2018-03-21 RX ADMIN — Medication 1 TABLET(S): at 17:24

## 2018-03-21 RX ADMIN — Medication 1 TABLET(S): at 09:04

## 2018-03-21 RX ADMIN — ESCITALOPRAM OXALATE 10 MILLIGRAM(S): 10 TABLET, FILM COATED ORAL at 11:54

## 2018-03-21 RX ADMIN — Medication 1 TABLET(S): at 11:54

## 2018-03-21 RX ADMIN — Medication 100 MICROGRAM(S): at 05:22

## 2018-03-21 RX ADMIN — CARBIDOPA AND LEVODOPA 2 TABLET(S): 25; 100 TABLET ORAL at 11:54

## 2018-03-21 RX ADMIN — Medication 81 MILLIGRAM(S): at 11:54

## 2018-03-21 RX ADMIN — Medication 100 MILLIGRAM(S): at 17:24

## 2018-03-21 RX ADMIN — CARBIDOPA AND LEVODOPA 2 TABLET(S): 25; 100 TABLET ORAL at 21:09

## 2018-03-21 RX ADMIN — Medication 1 SPRAY(S): at 05:22

## 2018-03-21 RX ADMIN — ERTAPENEM SODIUM 100 MILLIGRAM(S): 1 INJECTION, POWDER, LYOPHILIZED, FOR SOLUTION INTRAMUSCULAR; INTRAVENOUS at 17:24

## 2018-03-21 RX ADMIN — Medication 100 MILLIGRAM(S): at 05:22

## 2018-03-21 RX ADMIN — Medication 1 SPRAY(S): at 17:24

## 2018-03-21 RX ADMIN — ENOXAPARIN SODIUM 30 MILLIGRAM(S): 100 INJECTION SUBCUTANEOUS at 11:54

## 2018-03-21 RX ADMIN — CARBIDOPA AND LEVODOPA 2 TABLET(S): 25; 100 TABLET ORAL at 17:24

## 2018-03-21 RX ADMIN — CARBIDOPA AND LEVODOPA 2 TABLET(S): 25; 100 TABLET ORAL at 09:04

## 2018-03-21 RX ADMIN — CARBIDOPA AND LEVODOPA 1 TABLET(S): 25; 100 TABLET ORAL at 22:16

## 2018-03-21 RX ADMIN — Medication 5 MILLIGRAM(S): at 05:22

## 2018-03-21 NOTE — PROGRESS NOTE ADULT - SUBJECTIVE AND OBJECTIVE BOX
Neurology follow up note    JESUS [id number]      Interval History:    Patient feels ok     MEDICATIONS    aspirin enteric coated 81 milliGRAM(s) Oral daily  carbidopa/levodopa  25/100 2 Tablet(s) Oral <User Schedule>  carbidopa/levodopa CR 50/200 1 Tablet(s) Oral <User Schedule>  carbidopa/levodopa CR 50/200 0.5 Tablet(s) Oral <User Schedule>  docusate sodium 100 milliGRAM(s) Oral two times a day  enalapril 5 milliGRAM(s) Oral daily  enoxaparin Injectable 30 milliGRAM(s) SubCutaneous daily  ertapenem  IVPB 500 milliGRAM(s) IV Intermittent every 24 hours  escitalopram 10 milliGRAM(s) Oral daily  lactobacillus acidophilus 1 Tablet(s) Oral three times a day with meals  levothyroxine 100 MICROGram(s) Oral daily  polyethylene glycol 3350 17 Gram(s) Oral daily PRN  sodium chloride 0.65% Nasal 1 Spray(s) Both Nostrils two times a day      Allergies    beta blockers (Hives)  penicillin (Unknown)    Intolerances            Vital Signs Last 24 Hrs  T(C): 36.5 (21 Mar 2018 04:01), Max: 37.2 (20 Mar 2018 20:00)  T(F): 97.7 (21 Mar 2018 04:01), Max: 98.9 (20 Mar 2018 20:00)  HR: 63 (21 Mar 2018 05:26) (63 - 95)  BP: 142/46 (21 Mar 2018 05:26) (84/40 - 143/81)  BP(mean): 69 (20 Mar 2018 18:00) (53 - 100)  RR: 18 (21 Mar 2018 05:26) (15 - 27)  SpO2: 95% (21 Mar 2018 05:26) (94% - 100%)    REVIEW OF SYSTEMS:  Limit or unable to obtain secondary to patient's poor mental status.-- but feels ok     On Neurological Examination:  awake alert   [facility]  year 2018      Extraocular movements were intact.     Pupils were equal, round, and reactive bilaterally, 3 mm to 2.      Speech says few word        Motor, bilateral upper were 3/5, bilateral lower were in flex position.       No tremors in ext today    Increased tone in all four extremities.    Does follow commands    GENERAL Exam: Nontoxic , No Acute Distress   	  HEENT:  normocephalic, atraumatic  		  LUNGS:  Decreased bilaterally  	  HEART: Normal S1S2   No murmur RRR        	  GI/ ABDOMEN:  Soft  Non tender    EXTREMITIES:   No Edema  No Clubbing  No Cyanosis No Edema    MUSCULOSKELETAL: decreased Range of Motion all 4 extremities   	   SKIN: Normal  No Ecchymosis               LABS:  CBC Full  -  ( 20 Mar 2018 06:58 )  WBC Count : 8.7 K/uL  Hemoglobin : 11.8 g/dL  Hematocrit : 36.0 %  Platelet Count - Automated : 117 K/uL  Mean Cell Volume : 97.5 fl  Mean Cell Hemoglobin : 32.0 pg  Mean Cell Hemoglobin Concentration : 32.8 gm/dL  Auto Neutrophil # : x  Auto Lymphocyte # : x  Auto Monocyte # : x  Auto Eosinophil # : x  Auto Basophil # : x  Auto Neutrophil % : x  Auto Lymphocyte % : x  Auto Monocyte % : x  Auto Eosinophil % : x  Auto Basophil % : x      03-20    146<H>  |  108  |  35<H>  ----------------------------<  101<H>  3.9   |  35<H>  |  0.71    Ca    8.4      20 Mar 2018 06:58      Hemoglobin A1C:       Vitamin B12         RADIOLOGY    ANALYSIS AND PLAN:  A 89-year-old episode with change in mental status in regards to lethargy, history of Parkinson's disease, hypothyroidism.  1.	For change in mental status and lethargy most likely secondary to metabolic etiologies, respiratory issues. questionable UTI -- antibiotics as needed   2.	I would recommend monitor oxygen saturation, BiPAP as needed.  3.	For history of Parkinson's.  I will continue the patient on her Sinemet 25/100 two tablets at 9 a.m., 1 p.m., 5 p.m., 9 p.m.; and Sinemet extended release 200/50 half tablet 07:30 a.m. and one tablet at 10 p.m.  4.	For history of hypothyroidism, continue the patient on her Synthroid.  5.	The patient prognosis at present is guarded.  6.	overall more awake today   7.	no overnight events   8.	monitor oral intake   9.	if possible please avoid HALDOL try low dose zyprexa 2.5 if needed   10.	Michael  today 3/19/18    Thank you for the courtesy of this consultation.    Physical therapy evaluation as tolerated  OOB to chair/ambulation with assistance only if possible.    Greater than 35 minutes spent in direct patient care reviewing  the notes, lab data/ imaging , discussion with multidisciplinary team.

## 2018-03-21 NOTE — PROGRESS NOTE ADULT - SUBJECTIVE AND OBJECTIVE BOX
Patient is a 89y Female with a known history of :  Urinary tract infection without hematuria, site unspecified (N39.0)  Chronic diastolic congestive heart failure (I50.32)  Aortic valve stenosis, etiology of cardiac valve disease unspecified (I35.0)  Dementia with behavioral disturbance, unspecified dementia type (F03.91)  Agitation (R45.1)  Aortic stenosis (I35.0)  Human metapneumovirus (hMPV) pneumonia (J12.3)  Hypothyroidism, unspecified type (E03.9)  Parkinson's disease (G20)  Essential hypertension (I10)  Pleural effusion (J90)  Respiratory distress (R06.03)    HPI:  89 yr ol resident of [facility] assisted living facility, with PMH of Accelerated hypertension  Gastric reflux H/O TIA (transient ischemic attack) and stroke  History of mitral valve prolapse  Hypothyroid  Parkinson's disease  was having SOB, and was found to be hypoxic in facility and sent to hospital, In Ed pt is sob, oxygenating well, but markedly congested , altered mental state and had elevated D Dimer had CT angio has pleural effusion left more than rt.  . Unremarkable noncontrast CT scan of the brain.     Likely having CHf acute TTE ordered, cardio consult called, had b/l pleural effusion, pulmonary consult called, mild increase in LFT.  [facility] has many pts with hMPV infection , will try to out viral illness,  pt denies any bowel or urinary complaint, no fever (15 Mar 2018 15:43)          MEDICATIONS  (STANDING):  aspirin enteric coated 81 milliGRAM(s) Oral daily  carbidopa/levodopa  25/100 2 Tablet(s) Oral <User Schedule>  carbidopa/levodopa CR 50/200 1 Tablet(s) Oral <User Schedule>  carbidopa/levodopa CR 50/200 0.5 Tablet(s) Oral <User Schedule>  docusate sodium 100 milliGRAM(s) Oral two times a day  enalapril 5 milliGRAM(s) Oral daily  enoxaparin Injectable 30 milliGRAM(s) SubCutaneous daily  ertapenem  IVPB 500 milliGRAM(s) IV Intermittent every 24 hours  escitalopram 10 milliGRAM(s) Oral daily  lactobacillus acidophilus 1 Tablet(s) Oral three times a day with meals  levothyroxine 100 MICROGram(s) Oral daily  sodium chloride 0.65% Nasal 1 Spray(s) Both Nostrils two times a day    MEDICATIONS  (PRN):  polyethylene glycol 3350 17 Gram(s) Oral daily PRN Constipation      ALLERGIES: beta blockers (Hives)  penicillin (Unknown)      FAMILY HISTORY:      Social history:  Alochol:   Smoking:   Drug Use:   Marital Status:     PHYSICAL EXAMINATION:  -----------------------------  T(C): 36.4 (03-21-18 @ 08:25), Max: 37.2 (03-20-18 @ 20:00)  HR: 65 (03-21-18 @ 08:00) (63 - 95)  BP: 163/56 (03-21-18 @ 08:00) (84/40 - 163/56)  RR: 15 (03-21-18 @ 08:00) (15 - 27)  SpO2: 97% (03-21-18 @ 08:00) (94% - 100%)  Wt(kg): --    03-20 @ 07:01  -  03-21 @ 07:00  --------------------------------------------------------  IN:    Oral Fluid: 220 mL  Total IN: 220 mL    OUT:  Total OUT: 0 mL    Total NET: 220 mL            Constitutional: sitting in chair asleep   HEENT: normal oral mucosa, no oral pallor and no oral cyanosis.   Neck: normal jugular venous A waves present, normal jugular venous V waves present and no jugular venous alonzo A waves.   Pulmonary: fair air flow  Cardiovascular: heart rate and rhythm were normal, III/VI systolic blow aorta, fair S2  Musculoskeletal: the gait could not be assessed..   Skin: normal skin color and pigmentation, no rash, no venous stasis, no skin lesions, no skin ulcer and no xanthoma was obser    LABS:   --------  CBC Full  -  ( 20 Mar 2018 06:58 )  WBC Count : 8.7 K/uL  Hemoglobin : 11.8 g/dL  Hematocrit : 36.0 %  Platelet Count - Automated : 117 K/uL  Mean Cell Volume : 97.5 fl  Mean Cell Hemoglobin : 32.0 pg  Mean Cell Hemoglobin Concentration : 32.8 gm/dL  Auto Neutrophil # : x  Auto Lymphocyte # : x  Auto Monocyte # : x  Auto Eosinophil # : x  Auto Basophil # : x  Auto Neutrophil % : x  Auto Lymphocyte % : x  Auto Monocyte % : x  Auto Eosinophil % : x  Auto Basophil % : x      03-20    146<H>  |  108  |  35<H>  ----------------------------<  101<H>  3.9   |  35<H>  |  0.71    Ca    8.4      20 Mar 2018 06:58           3/21/2018:    In ICU  Tel = NSR  Asleep in chair            Radiology:

## 2018-03-21 NOTE — PROGRESS NOTE ADULT - SUBJECTIVE AND OBJECTIVE BOX
PULMONARY/CRITICAL CARE      INTERVAL HPI/OVERNIGHT EVENTS:  Pt. stable, no  sob. Confused, NAD. Speaking.    89y FemaleHPI:  89 yr ol resident of [facility] assisted living facility, with PMH of Accelerated hypertension  Gastric reflux H/O TIA (transient ischemic attack) and stroke  History of mitral valve prolapse  Hypothyroid  Parkinson's disease  was having SOB, and was found to be hypoxic in facility and sent to hospital, In Ed pt is sob, oxygenating well, but markedly congested , altered mental state and had elevated D Dimer had CT angio has pleural effusion left more than rt.  . Unremarkable noncontrast CT scan of the brain.     Likely having CHf acute TTE ordered, cardio consult called, had b/l pleural effusion, pulmonary consult called, mild increase in LFT.  [facility] has many pts with hMPV infection , will try to out viral illness,  pt denies any bowel or urinary complaint, no fever (15 Mar 2018 15:43)        PAST MEDICAL & SURGICAL HISTORY:  History of mitral valve prolapse  Gastric reflux  H/O TIA (transient ischemic attack) and stroke  Hypothyroid  Hypothyroidism, acquired  Parkinson's disease  Accelerated hypertension  History of tonsillectomy  History of cataract surgery  Hx of lumpectomy  History of appendectomy  History of ovarian cystectomy        ICU Vital Signs Last 24 Hrs  T(C): 36.5 (21 Mar 2018 04:01), Max: 37.2 (20 Mar 2018 20:00)  T(F): 97.7 (21 Mar 2018 04:01), Max: 98.9 (20 Mar 2018 20:00)  HR: 63 (21 Mar 2018 05:26) (63 - 95)  BP: 142/46 (21 Mar 2018 05:26) (84/40 - 143/81)  BP(mean): 69 (20 Mar 2018 18:00) (53 - 100)  ABP: --  ABP(mean): --  RR: 18 (21 Mar 2018 05:26) (15 - 27)  SpO2: 95% (21 Mar 2018 05:26) (94% - 100%)    Qtts:     I&O's Summary    20 Mar 2018 07:01  -  21 Mar 2018 07:00  --------------------------------------------------------  IN: 220 mL / OUT: 0 mL / NET: 220 mL      REVIEW OF SYSTEMS:    CONSTITUTIONAL: No fever, weight loss, or fatigue  EYES: No eye pain, visual disturbances, or discharge  ENMT:  No difficulty hearing, tinnitus, vertigo; No sinus or throat pain  NECK: No pain or stiffness  BREASTS: No pain, masses, or nipple discharge  RESPIRATORY: No cough, wheezing, chills or hemoptysis; No shortness of breath  CARDIOVASCULAR: No chest pain, palpitations, dizziness, or leg swelling  GASTROINTESTINAL: No abdominal or epigastric pain. No nausea, vomiting, or hematemesis; No diarrhea or constipation. No melena or hematochezia.  GENITOURINARY: No dysuria, frequency, hematuria, or incontinence  NEUROLOGICAL: No headaches, , numbness, s  SKIN: No itching, burning, rashes, or lesions   LYMPH NODES: No enlarged glands  ENDOCRINE: No heat or cold intolerance; No hair loss  MUSCULOSKELETAL: No joint pain or swelling; No muscle, back, or extremity pain, no calf tenderness  PSYCHIATRIC: No depression, anxiety, mood swings, or difficulty sleeping  HEME/LYMPH: No easy bruising, or bleeding gums  ALLERGY AND IMMUNOLOGIC: No hives or eczema      PHYSICAL EXAM:    GENERAL: NAD, well-groomed, well-developed, NAD  HEAD:  Atraumatic, Normocephalic  EYES: EOMI, PERRLA, conjunctiva and sclera clear  ENMT: No tonsillar erythema, exudates, or enlargement; Moist mucous membranes, Good dentition, No lesions  NECK: Supple, No JVD, Normal thyroid  NERVOUS SYSTEM:  poorly verbal, somewhat alert,  not moving ext much  CHEST/LUNG: Clear to percussion bilaterally; No rales, rhonchi, wheezing, or rubs Decreased bs bases  HEART: Regular rate and rhythm; No murmurs, rubs, or gallops  ABDOMEN: Soft, Nontender, Nondistended; Bowel sounds present  EXTREMITIES:  + Peripheral Pulses, No clubbing, cyanosis, or edema  Chronic changes legs.  LYMPH: No lymphadenopathy noted  SKIN: No rashes or lesions                  LABS:                        11.8   8.7   )-----------( 117      ( 20 Mar 2018 06:58 )             36.0     03-20    146<H>  |  108  |  35<H>  ----------------------------<  101<H>  3.9   |  35<H>  |  0.71    Ca    8.4      20 Mar 2018 06:58            vanco through     RADIOLOGY & ADDITIONAL STUDIES:      CRITICAL CARE TIME SPENT:

## 2018-03-21 NOTE — PROGRESS NOTE ADULT - ASSESSMENT
89 year old female, history of chronic pleural effusions, Parkinson/s, Hypertension, admitted for respiratory distress

## 2018-03-21 NOTE — PROGRESS NOTE ADULT - SUBJECTIVE AND OBJECTIVE BOX
JESUS MOYA is a 89yFemale , patient examined and chart reviewed.     INTERVAL HPI/ OVERNIGHT EVENTS:  Afebrile. No events.    Past Medical History--  PAST MEDICAL & SURGICAL HISTORY:  History of mitral valve prolapse  Gastric reflux  H/O TIA (transient ischemic attack) and stroke  Hypothyroid  Hypothyroidism, acquired  Parkinson's disease  Accelerated hypertension  History of tonsillectomy  History of cataract surgery  Hx of lumpectomy  History of appendectomy  History of ovarian cystectomy    For details regarding the patient's social history, family history, and other miscellaneous elements, please refer the initial infectious diseases consultation and/or the admitting history and physical examination for this admission.      ROS:  CONSTITUTIONAL:  Negative fever or chills  EYES:  Negative  blurry vision or double vision  CARDIOVASCULAR:  Negative for chest pain or palpitations  RESPIRATORY:  Negative for cough, wheezing, or SOB   GASTROINTESTINAL:  Negative for nausea, vomiting, diarrhea, constipation, or abdominal pain  GENITOURINARY:  Negative frequency, urgency , dysuria or hematuria   NEUROLOGIC:  No headache, dizziness, lightheadedness +confusion,  All other systems were reviewed and are negative     ALLERGIES  beta blockers (Hives)  penicillin (Unknown)      Current inpatient medications :    ANTIBIOTICS/RELEVANT:  ertapenem  IVPB 500 milliGRAM(s) IV Intermittent every 24 hours  lactobacillus acidophilus 1 Tablet(s) Oral three times a day with meals      aspirin enteric coated 81 milliGRAM(s) Oral daily  carbidopa/levodopa  25/100 2 Tablet(s) Oral <User Schedule>  carbidopa/levodopa CR 50/200 1 Tablet(s) Oral <User Schedule>  carbidopa/levodopa CR 50/200 0.5 Tablet(s) Oral <User Schedule>  docusate sodium 100 milliGRAM(s) Oral two times a day  enalapril 5 milliGRAM(s) Oral daily  enoxaparin Injectable 30 milliGRAM(s) SubCutaneous daily  escitalopram 10 milliGRAM(s) Oral daily  levothyroxine 100 MICROGram(s) Oral daily  polyethylene glycol 3350 17 Gram(s) Oral daily PRN  sodium chloride 0.65% Nasal 1 Spray(s) Both Nostrils two times a day      Objective:    03-20 @ 07:01  -  03-21 @ 07:00  --------------------------------------------------------  IN: 220 mL / OUT: 0 mL / NET: 220 mL      T(C): 36.4 (03-21-18 @ 12:44), Max: 37.2 (03-20-18 @ 20:00)  HR: 73 (03-21-18 @ 10:00) (63 - 90)  BP: 108/55 (03-21-18 @ 10:00) (108/55 - 163/56)  RR: 17 (03-21-18 @ 10:00) (15 - 26)  SpO2: 97% (03-21-18 @ 10:00) (94% - 100%)  Wt(kg): --      Physical Exam:  GEN: NAD,  HEENT: normocephalic and atraumatic. EOMI. BEATRICE. Moist mucosa. Clear Posterior pharynx.  NECK: Supple. No carotid bruits.  No lymphadenopathy or thyromegaly.  LUNGS: Clear to auscultation.  HEART: Regular rate and rhythm without murmur.  ABDOMEN: Soft, nontender, and nondistended.  Positive bowel sounds.  No hepatosplenomegaly was noted.  EXTREMITIES: Without any cyanosis, clubbing, rash, lesions or edema.  NEUROLOGIC: Awake Dementia  SKIN: No ulceration or induration present.      LABS:                        11.8   8.7   )-----------( 117      ( 20 Mar 2018 06:58 )             36.0       03-20    146<H>  |  108  |  35<H>  ----------------------------<  101<H>  3.9   |  35<H>  |  0.71    Ca    8.4      20 Mar 2018 06:58      RADIOLOGY & ADDITIONAL STUDIES:    EXAM:  XR CHEST PORTABLE ROUTINE 1V                                  PROCEDURE DATE:  03/21/2018      INTERPRETATION:  Clinical information: Pleural effusion    Portable study, 5:43 AM    Comparison study dated 3/15/2018.    No significant interval change in the overall appearance of the chest.   Bibasilar airspace disease, effusions-consolidation. Upper portions of   the lungs normally expanded. Heart size difficult to evaluate, loss of   definition of left cardiac border. Aorta shows atherosclerotic changes.   No acute osseous abnormalities. Surgical clips visible in the left axilla.    IMPRESSION:    See above report    Assessment :  89 YO F resident of [facility] assisted living facility PMH of HTN CVA MVP Parkinson's   disease admitted with SOB sec hMPV bronchitis with pneumonitis  Rasheed pleural effusions  CHF  Poss UTI  hx ESBL  Orophyrangeal dysphagia    Plan :   Cont Ertapenam day 6/7  Increase activity  Aspiration precautions    Continue with present regiment.  Appropriate use of antibiotics and adverse effects reviewed.    Critical care time greater then 35 minutes reviewing notes, labs data/ imaging , discussion with multidisciplinary team.    Thank you for allowing me to participate in care of your patient .        Santa Chavez MD  Infectious Disease  [phone]

## 2018-03-21 NOTE — PROGRESS NOTE ADULT - ASSESSMENT
89 yr ol resident of [facility] assisted living facility, with PMH of Accelerated hypertension  Gastric reflux H/O TIA (transient ischemic attack) and stroke  History of mitral valve prolapse  Hypothyroid  Parkinson's disease  was having SOB, and was found to be hypoxic in facility and sent to hospital, In Ed pt is sob, oxygenating well, but markedly congested , altered mental state and had elevated D Dimer had CT angio has pleural effusion left more than rt.  . Unremarkable noncontrast CT scan of the brain.     suspected CHf acute TTE ordered, cardio consult called, had b/l pleural effusion, pulmonary consult called, mild increase in LFT.  [facility] has many pts with hMPV infection , will try to out viral illness,  pt denies any bowel or urinary complaint, no fever   recently was in ed for head trauma,and was treated with abx for uti recently  admitted with acute respiratory distress with b/l pleural effusion with possible chf with H/o HTN with MVP, PE ruled out  has hMPV virus pneumonia with left interstitiai, secondary bacterial PNA with severe aortic stenosis, B/l pleural effusion left >rt, with moderate pulmonary HTN with diastolic dysfunction, with possible uti e coli ESBl in urine, started on  levofloxacin in ED,had  ID consult,and abx changed to ertapanem  swallowing assesement, out of bed,BIPAP as needed as per pulmonary, has soft abdominal distension non tender had  US abd no abnormality except cholelithiasis asymptomatic    d/w grandson who is ID physician, at [phone]  d/w with son ROberttoday- pt off oxygen, on  ertapanem for ESBL uti 6/7, orophryngeal dysphagia on puree diet with thickened liquids  diuresis as needed for CHF,parkinsons stable with tt as advised by neuro, dementia with behav disorder stable with supportive care, will cont levothyroxin for hypothyroidism  patient generally better,   patient planned for thoracentesis as per pulmonary family refused, ID f up  pt on ertapanem 6/7  will continue care and increase activity

## 2018-03-21 NOTE — PROGRESS NOTE ADULT - SUBJECTIVE AND OBJECTIVE BOX
Patient is a 89y old  Female who presents with a chief complaint of sob, Hypoxia (15 Mar 2018 15:43)      INTERVAL HPI/OVERNIGHT EVENTS:no new complaints    Home Medications:  acetaminophen-diphenhydramine 500 mg-25 mg oral capsule:  (03 Dec 2017 18:30)  aspirin 81 mg oral delayed release capsule:  orally once a day (03 Dec 2017 18:30)  carbidopa-levodopa 25 mg-100 mg oral tablet: 2  orally 4 times a day (03 Dec 2017 18:30)  carbidopa-levodopa 50 mg-200 mg oral tablet, extended release: 1 tab(s) orally every 6 hours (03 Dec 2017 18:30)  Centrum oral tablet, chewable: 1 tab(s) orally once a day (03 Dec 2017 18:30)  Colace 100 mg oral capsule:  orally 3 times a day (03 Dec 2017 18:00)  docusate potassium 100 mg oral capsule:  (03 Dec 2017 18:30)  enalapril 5 mg oral tablet: 1 tab(s) orally once a day (03 Dec 2017 18:30)  ergocalciferol 50,000 intl units (1.25 mg) oral capsule: 1 cap(s) orally every other week (03 Dec 2017 18:30)  Flonase 50 mcg/inh nasal spray: 1 spray(s) nasal once a day (03 Dec 2017 18:30)  levothyroxine 100 mcg (0.1 mg) oral tablet: 1 tab(s) orally once a day (03 Dec 2017 18:30)  Lexapro 10 mg oral tablet: 1 tab(s) orally once a day (03 Dec 2017 18:30)  loratadine 10 mg oral tablet: 1 tab(s) orally once a day (03 Dec 2017 18:30)  Milk of Magnesia:  (03 Dec 2017 18:30)  MiraLax oral powder for reconstitution:  (03 Dec 2017 18:30)  Ocuvite Lutein oral capsule: 1 cap(s) orally 2 times a day (03 Dec 2017 18:00)  omeprazole 20 mg oral delayed release capsule:  orally 2 times a day (03 Dec 2017 18:00)  polyethylene glycol 3350 oral powder for reconstitution:  orally every 3 days (03 Dec 2017 18:00)  senna 8.6 mg oral tablet: 2  orally once a day (at bedtime) (03 Dec 2017 18:30)  Senna 8.6 mg oral tablet: 1 tab(s) orally once a day (at bedtime) (03 Dec 2017 18:30)  Systane ophthalmic solution: 1 drop(s) to each affected eye 2 times a day (03 Dec 2017 18:00)  Systane ophthalmic solution: 1 drop(s) to each affected eye 2 times a day (03 Dec 2017 18:30)  zinc oxide topical ointment:  (03 Dec 2017 18:30)      MEDICATIONS  (STANDING):  aspirin enteric coated 81 milliGRAM(s) Oral daily  carbidopa/levodopa  25/100 2 Tablet(s) Oral <User Schedule>  carbidopa/levodopa CR 50/200 1 Tablet(s) Oral <User Schedule>  carbidopa/levodopa CR 50/200 0.5 Tablet(s) Oral <User Schedule>  docusate sodium 100 milliGRAM(s) Oral two times a day  enalapril 5 milliGRAM(s) Oral daily  enoxaparin Injectable 30 milliGRAM(s) SubCutaneous daily  ertapenem  IVPB 500 milliGRAM(s) IV Intermittent every 24 hours  escitalopram 10 milliGRAM(s) Oral daily  lactobacillus acidophilus 1 Tablet(s) Oral three times a day with meals  levothyroxine 100 MICROGram(s) Oral daily  sodium chloride 0.65% Nasal 1 Spray(s) Both Nostrils two times a day    MEDICATIONS  (PRN):  polyethylene glycol 3350 17 Gram(s) Oral daily PRN Constipation      Allergies    beta blockers (Hives)  penicillin (Unknown)    Intolerances        REVIEW OF SYSTEMS:  unable as pt confused at base line but denies any pain or SOB, and is being fed by the health aid in upright posture,   Vital Signs Last 24 Hrs  T(C): 36.5 (21 Mar 2018 04:01), Max: 37.2 (20 Mar 2018 20:00)  T(F): 97.7 (21 Mar 2018 04:01), Max: 98.9 (20 Mar 2018 20:00)  HR: 63 (21 Mar 2018 05:26) (63 - 95)  BP: 142/46 (21 Mar 2018 05:26) (84/40 - 143/62)  BP(mean): 69 (20 Mar 2018 18:00) (53 - 89)  RR: 18 (21 Mar 2018 05:26) (16 - 27)  SpO2: 95% (21 Mar 2018 05:26) (94% - 100%)    PHYSICAL EXAM:  GENERAL: NAD, well-groomed, well-developed  HEAD:  Atraumatic, Normocephalic  EYES: EOMI, PERRLA, conjunctiva and sclera clear  ENMT: Moist mucous membranes,   NECK: Supple, No JVD, Normal thyroid  NERVOUS SYSTEM: poor memory speech clear; Motor Strength 4/5 B/L upper and lower extremities; DTRs 2+ intact and symmetric  CHEST/LUNG: BS decreased at bases  HEART: Regular rate and rhythm; No murmurs, rubs, or gallops  ABDOMEN: Soft, Nontender, Nondistended; Bowel sounds present  EXTREMITIES:  2+ Peripheral Pulses, No clubbing, cyanosis, or edema  LYMPH: No lymphadenopathy noted  SKIN: No rashes or lesions    LABS:                        11.8   8.7   )-----------( 117      ( 20 Mar 2018 06:58 )             36.0     03-20    146<H>  |  108  |  35<H>  ----------------------------<  101<H>  3.9   |  35<H>  |  0.71    Ca    8.4      20 Mar 2018 06:58          CAPILLARY BLOOD GLUCOSE            Culture - Urine (collected 03-15-18 @ 21:40)  Source: .Urine Catheterized  Final Report (03-17-18 @ 17:20):    >100,000 CFU/ml Escherichia coli ESBL  Organism: Escherichia coli ESBL (03-17-18 @ 17:20)  Organism: Escherichia coli ESBL (03-17-18 @ 17:20)      -  Amikacin: S <=8      -  Ampicillin: R >16      -  Ampicillin/Sulbactam: R <=4/2      -  Aztreonam: R >16      -  Cefazolin: R >16      -  Cefepime: R >16      -  Cefoxitin: S 8      -  Ceftazidime: R 4      -  Ceftriaxone: R >32      -  Ciprofloxacin: R >2      -  Ertapenem: S <=0.5      -  Gentamicin: S <=1      -  Imipenem: S <=1      -  Levofloxacin: R >4      -  Meropenem: S <=1      -  Nitrofurantoin: S <=32      -  Piperacillin/Tazobactam: R <=8      -  Tobramycin: S <=2      -  Trimethoprim/Sulfamethoxazole: R >2/38      Method Type: ANN    Culture - Blood (collected 03-15-18 @ 21:36)  Source: .Blood Blood  Final Report (03-20-18 @ 22:00):    No growth at 5 days.    Culture - Blood (collected 03-15-18 @ 21:36)  Source: .Blood Blood  Final Report (03-20-18 @ 22:00):    No growth at 5 days.        I&O's Summary    20 Mar 2018 07:01  -  21 Mar 2018 07:00  --------------------------------------------------------  IN: 220 mL / OUT: 0 mL / NET: 220 mL        RADIOLOGY & ADDITIONAL TESTS:    Imaging Personally Reviewed:  [x ] YES  [ ] NO    Consultant(s) Notes Reviewed:  [ x] YES  [ ] NO    Care Discussed with Consultants/Other Providers [x ] YES  [ ] NO

## 2018-03-21 NOTE — PROGRESS NOTE ADULT - ASSESSMENT
89 yr ol resident of [facility], with PMH of Accelerated hypertension  Gastric reflux H/O TIA (transient ischemic attack) and stroke  History of mitral valve prolapse  Hypothyroid  Parkinson's disease  was having SOB, and was found to be hypoxic in facility and sent to hospital, In Ed pt is sob, oxygenating well, but markedly congested , altered mental state and had elevated D Dimer had CT angio has pleural effusion left more than rt.      Suspected CHf acute TTE ordered, cardio consult called, had b/l pleural effusion,        admitted with acute respiratory distress with b/l pleural effusion with possible chf with H/o HTN with MVP, PE ruled out  has hMPV virus pneumonia with left interstitiai, secondary bacterial PNA with severe aortic stenosis, B/l pleural effusion left >rt, with moderate pulmonary HTN with diastolic dysfunction, with possible uti e coli ESBl in urine, started on  levofloxacin in ED,had  ID consult,and abx changed to ertapanem  swallowing assesement, out of bed,BIPAP as needed as per pulmonary, has soft abdominal distension non tender had  US abd no abnormality except cholelithiasis asymptomatic    d     Stable for PEG 89 yr ol resident of [facility] assisted living [facility], with PMH of Accelerated hypertension  Gastric reflux H/O TIA (transient ischemic attack) and stroke  History of mitral valve prolapse  Hypothyroid  Parkinson's disease  was having SOB, and was found to be hypoxic in facility and sent to hospital, In Ed pt is sob, oxygenating well, but markedly congested , altered mental state and had elevated D Dimer had CT angio has pleural effusion left more than rt.      Suspected CHf acute TTE ordered, cardio consult called, had b/l pleural effusion,        admitted with acute respiratory distress with b/l pleural effusion with possible chf with H/o HTN with MVP, PE ruled out  has hMPV virus pneumonia with left interstitiai, secondary bacterial PNA with severe aortic stenosis, B/l pleural effusion left >rt, with moderate pulmonary HTN with diastolic dysfunction, with possible uti e coli ESBl in urine, started on  levofloxacin in ED,had  ID consult,and abx changed to ertapanem  swallowing assesement, out of bed,BIPAP as needed as per pulmonary, has soft abdominal distension non tender had  US abd no abnormality except cholelithiasis asymptomatic    d

## 2018-03-22 LAB
ANION GAP SERPL CALC-SCNC: 5 MMOL/L — SIGNIFICANT CHANGE UP (ref 5–17)
BUN SERPL-MCNC: 37 MG/DL — HIGH (ref 7–23)
CALCIUM SERPL-MCNC: 8.4 MG/DL — SIGNIFICANT CHANGE UP (ref 8.4–10.5)
CHLORIDE SERPL-SCNC: 110 MMOL/L — HIGH (ref 96–108)
CO2 SERPL-SCNC: 36 MMOL/L — HIGH (ref 22–31)
CREAT SERPL-MCNC: 0.71 MG/DL — SIGNIFICANT CHANGE UP (ref 0.5–1.3)
GLUCOSE SERPL-MCNC: 124 MG/DL — HIGH (ref 70–99)
POTASSIUM SERPL-MCNC: 3.3 MMOL/L — LOW (ref 3.5–5.3)
POTASSIUM SERPL-SCNC: 3.3 MMOL/L — LOW (ref 3.5–5.3)
SODIUM SERPL-SCNC: 151 MMOL/L — HIGH (ref 135–145)

## 2018-03-22 RX ORDER — POTASSIUM CHLORIDE 20 MEQ
20 PACKET (EA) ORAL
Qty: 0 | Refills: 0 | Status: COMPLETED | OUTPATIENT
Start: 2018-03-22 | End: 2018-03-22

## 2018-03-22 RX ORDER — SODIUM CHLORIDE 9 MG/ML
1000 INJECTION, SOLUTION INTRAVENOUS
Qty: 0 | Refills: 0 | Status: DISCONTINUED | OUTPATIENT
Start: 2018-03-22 | End: 2018-03-23

## 2018-03-22 RX ADMIN — Medication 1 TABLET(S): at 08:23

## 2018-03-22 RX ADMIN — Medication 81 MILLIGRAM(S): at 12:30

## 2018-03-22 RX ADMIN — Medication 100 MILLIGRAM(S): at 17:41

## 2018-03-22 RX ADMIN — ERTAPENEM SODIUM 100 MILLIGRAM(S): 1 INJECTION, POWDER, LYOPHILIZED, FOR SOLUTION INTRAMUSCULAR; INTRAVENOUS at 17:41

## 2018-03-22 RX ADMIN — Medication 100 MICROGRAM(S): at 05:34

## 2018-03-22 RX ADMIN — Medication 5 MILLIGRAM(S): at 05:34

## 2018-03-22 RX ADMIN — Medication 1 SPRAY(S): at 05:34

## 2018-03-22 RX ADMIN — SODIUM CHLORIDE 75 MILLILITER(S): 9 INJECTION, SOLUTION INTRAVENOUS at 12:31

## 2018-03-22 RX ADMIN — Medication 1 SPRAY(S): at 17:42

## 2018-03-22 RX ADMIN — CARBIDOPA AND LEVODOPA 2 TABLET(S): 25; 100 TABLET ORAL at 17:41

## 2018-03-22 RX ADMIN — ENOXAPARIN SODIUM 30 MILLIGRAM(S): 100 INJECTION SUBCUTANEOUS at 12:31

## 2018-03-22 RX ADMIN — CARBIDOPA AND LEVODOPA 2 TABLET(S): 25; 100 TABLET ORAL at 08:23

## 2018-03-22 RX ADMIN — Medication 1 TABLET(S): at 17:41

## 2018-03-22 RX ADMIN — Medication 1 TABLET(S): at 12:32

## 2018-03-22 RX ADMIN — ESCITALOPRAM OXALATE 10 MILLIGRAM(S): 10 TABLET, FILM COATED ORAL at 12:30

## 2018-03-22 RX ADMIN — CARBIDOPA AND LEVODOPA 2 TABLET(S): 25; 100 TABLET ORAL at 21:36

## 2018-03-22 RX ADMIN — CARBIDOPA AND LEVODOPA 2 TABLET(S): 25; 100 TABLET ORAL at 12:30

## 2018-03-22 RX ADMIN — Medication 20 MILLIEQUIVALENT(S): at 12:29

## 2018-03-22 RX ADMIN — Medication 20 MILLIEQUIVALENT(S): at 14:07

## 2018-03-22 RX ADMIN — CARBIDOPA AND LEVODOPA 0.5 TABLET(S): 25; 100 TABLET ORAL at 05:34

## 2018-03-22 RX ADMIN — Medication 100 MILLIGRAM(S): at 05:34

## 2018-03-22 NOTE — PROGRESS NOTE ADULT - ASSESSMENT
89 yr ol resident of [facility] assisted living facility, with PMH of Accelerated hypertension  Gastric reflux H/O TIA (transient ischemic attack) and stroke  History of mitral valve prolapse  Hypothyroid  Parkinson's disease  was having SOB, and was found to be hypoxic in facility and sent to hospital, In Ed pt is sob, oxygenating well, but markedly congested , altered mental state and had elevated D Dimer had CT angio has pleural effusion left more than rt.  . Unremarkable noncontrast CT scan of the brain.     suspected CHf acute TTE ordered, cardio consult called, had b/l pleural effusion, pulmonary consult called, mild increase in LFT.  [facility] has many pts with hMPV infection , will try to out viral illness,  pt denies any bowel or urinary complaint, no fever   recently was in ed for head trauma,and was treated with abx for uti recently  admitted with acute respiratory distress with b/l pleural effusion with possible chf with H/o HTN with MVP, PE ruled out  has hMPV virus pneumonia with left interstitiai, secondary bacterial PNA with severe aortic stenosis, B/l pleural effusion left >rt, with moderate pulmonary HTN with diastolic dysfunction, with possible uti e coli ESBl in urine, started on  levofloxacin in ED,had  ID consult,and abx changed to ertapanem  swallowing assesement, out of bed,BIPAP as needed as per pulmonary, has soft abdominal distension non tender had  US abd no abnormality except cholelithiasis asymptomatic    d/w grandson who is ID physician, at [phone]  d/w with son ROberttoday- pt off oxygen, on  ertapanem for ESBL uti 7/7, orophryngeal dysphagia on puree diet with thickened liquids  diuresis as needed for CHF,parkinsons stable with tt as advised by neuro, dementia with behav disorder stable with supportive care, will cont levothyroxin for hypothyroidism  patient generally better,   patient planned for thoracentesis as per pulmonary family refused,   will continue care and increase activity,DC plan

## 2018-03-22 NOTE — PROGRESS NOTE ADULT - SUBJECTIVE AND OBJECTIVE BOX
JESUS MOYA is a 89yFemale , patient examined and chart reviewed.      INTERVAL HPI/ OVERNIGHT EVENTS:  No events. SItting in chair.  Afebrile.    Past Medical History--  PAST MEDICAL & SURGICAL HISTORY:  History of mitral valve prolapse  Gastric reflux  H/O TIA (transient ischemic attack) and stroke  Hypothyroid  Hypothyroidism, acquired  Parkinson's disease  Accelerated hypertension  History of tonsillectomy  History of cataract surgery  Hx of lumpectomy  History of appendectomy  History of ovarian cystectomy      For details regarding the patient's social history, family history, and other miscellaneous elements, please refer the initial infectious diseases consultation and/or the admitting history and physical examination for this admission.    ROS:  CONSTITUTIONAL:  Negative fever or chills  EYES:  Negative  blurry vision or double vision  CARDIOVASCULAR:  Negative for chest pain or palpitations  RESPIRATORY:  Negative for cough, wheezing, or SOB   GASTROINTESTINAL:  Negative for nausea, vomiting, diarrhea, constipation, or abdominal pain  GENITOURINARY:  Negative frequency, urgency , dysuria or hematuria   NEUROLOGIC:  No headache, dizziness, lightheadedness  +confusion  All other systems were reviewed and are negative     ALLERGIES:  beta blockers (Hives)  penicillin (Unknown)      Current inpatient medications :    ANTIBIOTICS/RELEVANT:  lactobacillus acidophilus 1 Tablet(s) Oral three times a day with meals      aspirin enteric coated 81 milliGRAM(s) Oral daily  carbidopa/levodopa  25/100 2 Tablet(s) Oral <User Schedule>  carbidopa/levodopa CR 50/200 1 Tablet(s) Oral <User Schedule>  carbidopa/levodopa CR 50/200 0.5 Tablet(s) Oral <User Schedule>  docusate sodium 100 milliGRAM(s) Oral two times a day  enalapril 5 milliGRAM(s) Oral daily  enoxaparin Injectable 30 milliGRAM(s) SubCutaneous daily  escitalopram 10 milliGRAM(s) Oral daily  levothyroxine 100 MICROGram(s) Oral daily  polyethylene glycol 3350 17 Gram(s) Oral daily PRN  sodium chloride 0.45%. 1000 milliLiter(s) IV Continuous <Continuous>  sodium chloride 0.65% Nasal 1 Spray(s) Both Nostrils two times a day      Objective:    03-21 @ 07:01  -  03-22 @ 07:00  --------------------------------------------------------  IN: 570 mL / OUT: 0 mL / NET: 570 mL    03-22 @ 07:01  -  03-22 @ 22:00  --------------------------------------------------------  IN: 290 mL / OUT: 0 mL / NET: 290 mL      T(C): 36.4 (03-22-18 @ 20:12), Max: 37.1 (03-22-18 @ 12:30)  HR: 70 (03-22-18 @ 20:00) (67 - 88)  BP: 156/57 (03-22-18 @ 20:00) (100/59 - 156/57)  RR: 23 (03-22-18 @ 20:00) (13 - 28)  SpO2: 89% (03-22-18 @ 20:00) (89% - 100%)  Wt(kg): --      Physical Exam:  GEN: NAD  HEENT: normocephalic and atraumatic. EOMI. BEATRICE. Moist mucosa. Clear Posterior pharynx.  NECK: Supple. No carotid bruits.  No lymphadenopathy or thyromegaly.  LUNGS: Clear to auscultation.  HEART: Regular rate and rhythm without murmur.  ABDOMEN: Soft, nontender, and nondistended.  Positive bowel sounds.  No hepatosplenomegaly was noted.  EXTREMITIES: Without any cyanosis, clubbing, rash, lesions or edema.  NEUROLOGIC: Dementia no focal deficit  SKIN: No ulceration or induration present.      LABS:      03-22    151<H>  |  110<H>  |  37<H>  ----------------------------<  124<H>  3.3<L>   |  36<H>  |  0.71    Ca    8.4      22 Mar 2018 06:37    Assessment :  89 YO F resident of [facility] assisted living facility PMH of HTN CVA MVP Parkinson's   disease admitted with SOB sec hMPV bronchitis with pneumonitis  Rasheed pleural effusions  CHF  Poss UTI  hx ESBL  Orophyrangeal dysphagia    Plan :   Completed Ertapenam day 7 today   Monitor off antibiotics  Increase activity  Aspiration precautions      Continue with present regiment.  Appropriate use of antibiotics and adverse effects reviewed.      Critical care time greater then 35 minutes reviewing notes, labs data/ imaging , discussion with multidisciplinary team.    Thank you for allowing me to participate in care of your patient .        Santa Chavez MD  Infectious Disease  [phone]

## 2018-03-22 NOTE — PROGRESS NOTE ADULT - SUBJECTIVE AND OBJECTIVE BOX
PULMONARY/CRITICAL CARE      INTERVAL HPI/OVERNIGHT EVENTS:  Pt. alert, nad. Confused. No sob. Sats ok  89y FemaleHPI:  89 yr ol resident of [facility] assisted living facility, with PMH of Accelerated hypertension  Gastric reflux H/O TIA (transient ischemic attack) and stroke  History of mitral valve prolapse  Hypothyroid  Parkinson's disease  was having SOB, and was found to be hypoxic in facility and sent to hospital, In Ed pt is sob, oxygenating well, but markedly congested , altered mental state and had elevated D Dimer had CT angio has pleural effusion left more than rt.  . Unremarkable noncontrast CT scan of the brain.     Likely having CHf acute TTE ordered, cardio consult called, had b/l pleural effusion, pulmonary consult called, mild increase in LFT.  [facility] has many pts with hMPV infection , will try to out viral illness,  pt denies any bowel or urinary complaint, no fever (15 Mar 2018 15:43)        PAST MEDICAL & SURGICAL HISTORY:  History of mitral valve prolapse  Gastric reflux  H/O TIA (transient ischemic attack) and stroke  Hypothyroid  Hypothyroidism, acquired  Parkinson's disease  Accelerated hypertension  History of tonsillectomy  History of cataract surgery  Hx of lumpectomy  History of appendectomy  History of ovarian cystectomy        ICU Vital Signs Last 24 Hrs  T(C): 36.7 (22 Mar 2018 04:04), Max: 36.7 (21 Mar 2018 16:11)  T(F): 98.1 (22 Mar 2018 04:04), Max: 98.1 (21 Mar 2018 23:57)  HR: 72 (22 Mar 2018 06:03) (65 - 89)  BP: 117/70 (22 Mar 2018 06:03) (100/72 - 163/56)  BP(mean): 86 (22 Mar 2018 06:03) (68 - 87)  ABP: --  ABP(mean): --  RR: 19 (22 Mar 2018 06:03) (13 - 23)  SpO2: 100% (22 Mar 2018 06:03) (93% - 100%)    Qtts:     I&O's Summary    20 Mar 2018 07:01  -  21 Mar 2018 07:00  --------------------------------------------------------  IN: 220 mL / OUT: 0 mL / NET: 220 mL    21 Mar 2018 07:01  -  22 Mar 2018 06:27  --------------------------------------------------------  IN: 570 mL / OUT: 0 mL / NET: 570 mL        REVIEW OF SYSTEMS:    CONSTITUTIONAL: No fever, weight loss, or fatigue  EYES: No eye pain, visual disturbances, or discharge  ENMT:  No difficulty hearing, tinnitus, vertigo; No sinus or throat pain  NECK: No pain or stiffness  BREASTS: No pain, masses, or nipple discharge  RESPIRATORY: No cough, wheezing, chills or hemoptysis; No shortness of breath  CARDIOVASCULAR: No chest pain, palpitations, dizziness, or leg swelling  GASTROINTESTINAL: No abdominal or epigastric pain. No nausea, vomiting, or hematemesis; No diarrhea or constipation. No melena or hematochezia.  GENITOURINARY: No dysuria, frequency, hematuria, or incontinence  NEUROLOGICAL: No headaches, , numbness, s  SKIN: No itching, burning, rashes, or lesions   LYMPH NODES: No enlarged glands  ENDOCRINE: No heat or cold intolerance; No hair loss  MUSCULOSKELETAL: No joint pain or swelling; No muscle, back, or extremity pain, no calf tenderness  PSYCHIATRIC: No depression, anxiety, mood swings, or difficulty sleeping  HEME/LYMPH: No easy bruising, or bleeding gums  ALLERGY AND IMMUNOLOGIC: No hives or eczema      PHYSICAL EXAM:    GENERAL: NAD, well-groomed, well-developed, NAD  HEAD:  Atraumatic, Normocephalic  EYES: EOMI, PERRLA, conjunctiva and sclera clear  ENMT: No tonsillar erythema, exudates, or enlargement; Moist mucous membranes, Good dentition, No lesions  NECK: Supple, No JVD, Normal thyroid  NERVOUS SYSTEM:  poorly verbal, somewhat alert,  not moving ext much  CHEST/LUNG: Clear to percussion bilaterally; No rales, rhonchi, wheezing, or rubs Decreased bs bases  HEART: Regular rate and rhythm; No murmurs, rubs, or gallops  ABDOMEN: Soft, Nontender, Nondistended; Bowel sounds present  EXTREMITIES:  + Peripheral Pulses, No clubbing, cyanosis, or edema  Chronic changes legs.  LYMPH: No lymphadenopathy noted  SKIN: No rashes or lesions                LABS:                        11.8   8.7   )-----------( 117      ( 20 Mar 2018 06:58 )             36.0     03-20    146<H>  |  108  |  35<H>  ----------------------------<  101<H>  3.9   |  35<H>  |  0.71    Ca    8.4      20 Mar 2018 06:58            vanco through     RADIOLOGY & ADDITIONAL STUDIES:    < from: Xray Chest 1 View- PORTABLE-Routine (03.21.18 @ 06:07) >  EXAM:  XR CHEST PORTABLE ROUTINE 1V                                  PROCEDURE DATE:  03/21/2018          INTERPRETATION:  Clinical information: Pleural effusion    Portable study, 5:43 AM    Comparison study dated 3/15/2018.    No significant interval change in the overall appearance of the chest.   Bibasilar airspace disease, effusions-consolidation. Upper portions of   the lungs normally expanded. Heart size difficult to evaluate, loss of   definition of left cardiac border. Aorta shows atherosclerotic changes.   No acute osseous abnormalities. Surgical clips visible in the left axilla.    IMPRESSION:    See above report                  LINA ALEJANDRA M.D.,ATTENDING RADIOLOGIST  This document has been electronically signed. Mar 21 2018  8:40AM        < end of copied text >    CRITICAL CARE TIME SPENT:

## 2018-03-22 NOTE — PROGRESS NOTE ADULT - SUBJECTIVE AND OBJECTIVE BOX
Neurology follow up note    JESUS [id number]      Interval History:    Patient feels ok sitting in chair     MEDICATIONS    aspirin enteric coated 81 milliGRAM(s) Oral daily  carbidopa/levodopa  25/100 2 Tablet(s) Oral <User Schedule>  carbidopa/levodopa CR 50/200 1 Tablet(s) Oral <User Schedule>  carbidopa/levodopa CR 50/200 0.5 Tablet(s) Oral <User Schedule>  docusate sodium 100 milliGRAM(s) Oral two times a day  enalapril 5 milliGRAM(s) Oral daily  enoxaparin Injectable 30 milliGRAM(s) SubCutaneous daily  ertapenem  IVPB 500 milliGRAM(s) IV Intermittent every 24 hours  escitalopram 10 milliGRAM(s) Oral daily  lactobacillus acidophilus 1 Tablet(s) Oral three times a day with meals  levothyroxine 100 MICROGram(s) Oral daily  polyethylene glycol 3350 17 Gram(s) Oral daily PRN  potassium chloride    Tablet ER 20 milliEquivalent(s) Oral every 2 hours  sodium chloride 0.45%. 1000 milliLiter(s) IV Continuous <Continuous>  sodium chloride 0.65% Nasal 1 Spray(s) Both Nostrils two times a day      Allergies    beta blockers (Hives)  penicillin (Unknown)    Intolerances            Vital Signs Last 24 Hrs  T(C): 36.7 (22 Mar 2018 04:04), Max: 36.7 (21 Mar 2018 16:11)  T(F): 98.1 (22 Mar 2018 04:04), Max: 98.1 (21 Mar 2018 23:57)  HR: 76 (22 Mar 2018 11:13) (67 - 89)  BP: 116/52 (22 Mar 2018 11:13) (100/72 - 134/52)  BP(mean): 72 (22 Mar 2018 11:13) (59 - 87)  RR: 28 (22 Mar 2018 11:13) (13 - 28)  SpO2: 100% (22 Mar 2018 11:13) (93% - 100%)      REVIEW OF SYSTEMS:  Limit or unable to obtain secondary to patient's poor mental status.-- but feels ok     On Neurological Examination:  awake alert   [facility]  year 2018      Extraocular movements were intact.     Pupils were equal, round, and reactive bilaterally, 3 mm to 2.      Speech says few word        Motor, bilateral upper were 3/5, bilateral lower were in flex position.       No tremors in ext today    Increased tone in all four extremities.    Does follow commands    GENERAL Exam: Nontoxic , No Acute Distress   	  HEENT:  normocephalic, atraumatic  		  LUNGS:  Decreased bilaterally  	  HEART: Normal S1S2   No murmur RRR        	  GI/ ABDOMEN:  Soft  Non tender    EXTREMITIES:   No Edema  No Clubbing  No Cyanosis No Edema    MUSCULOSKELETAL: decreased Range of Motion all 4 extremities   	   SKIN: Normal  No Ecchymosis             LABS:      03-22    151<H>  |  110<H>  |  37<H>  ----------------------------<  124<H>  3.3<L>   |  36<H>  |  0.71    Ca    8.4      22 Mar 2018 06:37      Hemoglobin A1C:       Vitamin B12         RADIOLOGY    ANALYSIS AND PLAN:  A 89-year-old episode with change in mental status in regards to lethargy, history of Parkinson's disease, hypothyroidism.  1.	For change in mental status and lethargy most likely secondary to metabolic etiologies, respiratory issues. questionable UTI -- antibiotics as needed   2.	I would recommend monitor oxygen saturation, BiPAP as needed.  3.	For history of Parkinson's.  I will continue the patient on her Sinemet 25/100 two tablets at 9 a.m., 1 p.m., 5 p.m., 9 p.m.; and Sinemet extended release 200/50 half tablet 07:30 a.m. and one tablet at 10 p.m.  4.	For history of hypothyroidism, continue the patient on her Synthroid.  5.	The patient prognosis at present is guarded.  6.	aspiration precautions   7.	monitor oral intake   8.	if possible please avoid HALDOL try low dose zyprexa 2.5 if needed   9.	Michael  today 3/19/18  10.	no new events     Thank you for the courtesy of this consultation.    Physical therapy evaluation as tolerated  OOB to chair/ambulation with assistance only if possible.    Greater than 35 minutes spent in direct patient care reviewing  the notes, lab data/ imaging , discussion with multidisciplinary team.

## 2018-03-22 NOTE — PROGRESS NOTE ADULT - SUBJECTIVE AND OBJECTIVE BOX
Patient is a 89y Female with a known history of :  Urinary tract infection without hematuria, site unspecified (N39.0)  Chronic diastolic congestive heart failure (I50.32)  Aortic valve stenosis, etiology of cardiac valve disease unspecified (I35.0)  Dementia with behavioral disturbance, unspecified dementia type (F03.91)  Agitation (R45.1)  Aortic stenosis (I35.0)  Human metapneumovirus (hMPV) pneumonia (J12.3)  Hypothyroidism, unspecified type (E03.9)  Parkinson's disease (G20)  Essential hypertension (I10)  Pleural effusion (J90)  Respiratory distress (R06.03)    HPI:  89 yr ol resident of [facility] assisted living facility, with PMH of Accelerated hypertension  Gastric reflux H/O TIA (transient ischemic attack) and stroke  History of mitral valve prolapse  Hypothyroid  Parkinson's disease  was having SOB, and was found to be hypoxic in facility and sent to hospital, In Ed pt is sob, oxygenating well, but markedly congested , altered mental state and had elevated D Dimer had CT angio has pleural effusion left more than rt.  . Unremarkable noncontrast CT scan of the brain.     Likely having CHf acute TTE ordered, cardio consult called, had b/l pleural effusion, pulmonary consult called, mild increase in LFT.  [facility] has many pts with hMPV infection , will try to out viral illness,  pt denies any bowel or urinary complaint, no fever (15 Mar 2018 15:43)      REVIEW OF SYSTEMS:    CONSTITUTIONAL: No fever, weight loss, or fatigue  EYES: No eye pain, visual disturbances, or discharge  ENMT:  No difficulty hearing, tinnitus, vertigo; No sinus or throat pain  NECK: No pain or stiffness  BREASTS: No pain, masses, or nipple discharge  RESPIRATORY: No cough, wheezing, chills or hemoptysis; No shortness of breath  CARDIOVASCULAR: No chest pain, palpitations, dizziness, or leg swelling  GASTROINTESTINAL: No abdominal or epigastric pain. No nausea, vomiting, or hematemesis; No diarrhea or constipation. No melena or hematochezia.  GENITOURINARY: No dysuria, frequency, hematuria, or incontinence  NEUROLOGICAL: No headaches, memory loss, loss of strength, numbness, or tremors  SKIN: No itching, burning, rashes, or lesions   LYMPH NODES: No enlarged glands  ENDOCRINE: No heat or cold intolerance; No hair loss  MUSCULOSKELETAL: No joint pain or swelling; No muscle, back, or extremity pain  PSYCHIATRIC: No depression, anxiety, mood swings, or difficulty sleeping  HEME/LYMPH: No easy bruising, or bleeding gums  ALLERGY AND IMMUNOLOGIC: No hives or eczema    MEDICATIONS  (STANDING):  aspirin enteric coated 81 milliGRAM(s) Oral daily  carbidopa/levodopa  25/100 2 Tablet(s) Oral <User Schedule>  carbidopa/levodopa CR 50/200 1 Tablet(s) Oral <User Schedule>  carbidopa/levodopa CR 50/200 0.5 Tablet(s) Oral <User Schedule>  docusate sodium 100 milliGRAM(s) Oral two times a day  enalapril 5 milliGRAM(s) Oral daily  enoxaparin Injectable 30 milliGRAM(s) SubCutaneous daily  ertapenem  IVPB 500 milliGRAM(s) IV Intermittent every 24 hours  escitalopram 10 milliGRAM(s) Oral daily  lactobacillus acidophilus 1 Tablet(s) Oral three times a day with meals  levothyroxine 100 MICROGram(s) Oral daily  potassium chloride    Tablet ER 20 milliEquivalent(s) Oral every 2 hours  sodium chloride 0.65% Nasal 1 Spray(s) Both Nostrils two times a day    MEDICATIONS  (PRN):  polyethylene glycol 3350 17 Gram(s) Oral daily PRN Constipation      ALLERGIES: beta blockers (Hives)  penicillin (Unknown)      FAMILY HISTORY:      Social history:  Alochol:   Smoking:   Drug Use:   Marital Status:     PHYSICAL EXAMINATION:  -----------------------------  T(C): 36.7 (03-22-18 @ 04:04), Max: 36.7 (03-21-18 @ 16:11)  HR: 72 (03-22-18 @ 06:03) (70 - 89)  BP: 117/70 (03-22-18 @ 06:03) (100/72 - 131/76)  RR: 19 (03-22-18 @ 06:03) (13 - 23)  SpO2: 100% (03-22-18 @ 06:03) (93% - 100%)  Wt(kg): --    03-21 @ 07:01  -  03-22 @ 07:00  --------------------------------------------------------  IN:    Oral Fluid: 570 mL  Total IN: 570 mL    OUT:  Total OUT: 0 mL    Total NET: 570 mL            Constitutional: well developed, normal appearance, well groomed, well nourished, no deformities and no acute distress.   Eyes: the conjunctiva exhibited no abnormalities and the eyelids demonstrated no xanthelasmas.   HEENT: normal oral mucosa, no oral pallor and no oral cyanosis.   Neck: normal jugular venous A waves present, normal jugular venous V waves present and no jugular venous alonzo A waves.   Pulmonary: no respiratory distress, normal respiratory rhythm and effort, no accessory muscle use and lungs were clear to auscultation bilaterally. Anteriorly.  Cardiovascular: distant sounds, heart rate and rhythm were normal, decreased S1 and S2 with II/VI systolic murmur.    Musculoskeletal: the gait could not be assessed.   Extremities: no clubbing of the fingernails, no localized cyanosis, no petechial hemorrhages and no ischemic changes.   Skin: normal skin color and pigmentation, no rash, no venous stasis, no skin lesions, no skin ulcer and no xanthoma was observed.      LABS:   --------  03-22    151<H>  |  110<H>  |  37<H>  ----------------------------<  124<H>  3.3<L>   |  36<H>  |  0.71    Ca    8.4      22 Mar 2018 06:37                     Radiology:

## 2018-03-22 NOTE — PROGRESS NOTE ADULT - ASSESSMENT
89y/o w/f seen at [facility] ICU.  History dementia, pleural effusions, thyroid disease, HTN, parkinson's disease, GERD, CVA.  Admitted with respiratory distress and positive rapid RVP.    3/20/18  Patient awake and alert.  No complaints offered.  Monitor compatible with NSR.  No significant cardiac arrhythmias documented.  Echocardiogram compatible with severe AS and moderate Pulmonary HTN (see above report).    Plan:  - Continue present therapy.  - K-supps and follow labs.  - Encourage nutrition.  - Out of bed with assistance if possible.  - Being followed by Pulmonary, ID and Neurology. 89y/o w/f seen at [facility] ICU.  History dementia, pleural effusions, thyroid disease, HTN, parkinson's disease, GERD, CVA.  Admitted with respiratory distress and positive rapid RVP.    3/22/18  Patient awake and alert.  No complaints offered.  Monitor compatible with NSR.  No significant cardiac arrhythmias documented.  Echocardiogram compatible with severe AS and moderate Pulmonary HTN (see above report).    Plan:  - Continue present therapy.  - K-supps and follow labs.  - Encourage nutrition.  - Out of bed with assistance if possible.  - Being followed by Pulmonary, ID and Neurology.

## 2018-03-22 NOTE — PROGRESS NOTE ADULT - SUBJECTIVE AND OBJECTIVE BOX
Patient is a 89y old  Female who presents with a chief complaint of sob, Hypoxia (15 Mar 2018 15:43)      INTERVAL HPI/OVERNIGHT EVENTS:    Home Medications:  acetaminophen-diphenhydramine 500 mg-25 mg oral capsule:  (03 Dec 2017 18:30)  aspirin 81 mg oral delayed release capsule:  orally once a day (03 Dec 2017 18:30)  carbidopa-levodopa 25 mg-100 mg oral tablet: 2  orally 4 times a day (03 Dec 2017 18:30)  carbidopa-levodopa 50 mg-200 mg oral tablet, extended release: 1 tab(s) orally every 6 hours (03 Dec 2017 18:30)  Centrum oral tablet, chewable: 1 tab(s) orally once a day (03 Dec 2017 18:30)  Colace 100 mg oral capsule:  orally 3 times a day (03 Dec 2017 18:00)  docusate potassium 100 mg oral capsule:  (03 Dec 2017 18:30)  enalapril 5 mg oral tablet: 1 tab(s) orally once a day (03 Dec 2017 18:30)  ergocalciferol 50,000 intl units (1.25 mg) oral capsule: 1 cap(s) orally every other week (03 Dec 2017 18:30)  Flonase 50 mcg/inh nasal spray: 1 spray(s) nasal once a day (03 Dec 2017 18:30)  levothyroxine 100 mcg (0.1 mg) oral tablet: 1 tab(s) orally once a day (03 Dec 2017 18:30)  Lexapro 10 mg oral tablet: 1 tab(s) orally once a day (03 Dec 2017 18:30)  loratadine 10 mg oral tablet: 1 tab(s) orally once a day (03 Dec 2017 18:30)  Milk of Magnesia:  (03 Dec 2017 18:30)  MiraLax oral powder for reconstitution:  (03 Dec 2017 18:30)  Ocuvite Lutein oral capsule: 1 cap(s) orally 2 times a day (03 Dec 2017 18:00)  omeprazole 20 mg oral delayed release capsule:  orally 2 times a day (03 Dec 2017 18:00)  polyethylene glycol 3350 oral powder for reconstitution:  orally every 3 days (03 Dec 2017 18:00)  senna 8.6 mg oral tablet: 2  orally once a day (at bedtime) (03 Dec 2017 18:30)  Senna 8.6 mg oral tablet: 1 tab(s) orally once a day (at bedtime) (03 Dec 2017 18:30)  Systane ophthalmic solution: 1 drop(s) to each affected eye 2 times a day (03 Dec 2017 18:00)  Systane ophthalmic solution: 1 drop(s) to each affected eye 2 times a day (03 Dec 2017 18:30)  zinc oxide topical ointment:  (03 Dec 2017 18:30)      MEDICATIONS  (STANDING):  aspirin enteric coated 81 milliGRAM(s) Oral daily  carbidopa/levodopa  25/100 2 Tablet(s) Oral <User Schedule>  carbidopa/levodopa CR 50/200 1 Tablet(s) Oral <User Schedule>  carbidopa/levodopa CR 50/200 0.5 Tablet(s) Oral <User Schedule>  docusate sodium 100 milliGRAM(s) Oral two times a day  enalapril 5 milliGRAM(s) Oral daily  enoxaparin Injectable 30 milliGRAM(s) SubCutaneous daily  ertapenem  IVPB 500 milliGRAM(s) IV Intermittent every 24 hours  escitalopram 10 milliGRAM(s) Oral daily  lactobacillus acidophilus 1 Tablet(s) Oral three times a day with meals  levothyroxine 100 MICROGram(s) Oral daily  potassium chloride    Tablet ER 20 milliEquivalent(s) Oral every 2 hours  sodium chloride 0.65% Nasal 1 Spray(s) Both Nostrils two times a day    MEDICATIONS  (PRN):  polyethylene glycol 3350 17 Gram(s) Oral daily PRN Constipation      Allergies    beta blockers (Hives)  penicillin (Unknown)    Intolerances        REVIEW OF SYSTEMS:  unable as pt confused with dementia, very emotional today, cries easily, denies any pain or sob,   Vital Signs Last 24 Hrs  T(C): 36.7 (22 Mar 2018 04:04), Max: 36.7 (21 Mar 2018 16:11)  T(F): 98.1 (22 Mar 2018 04:04), Max: 98.1 (21 Mar 2018 23:57)  HR: 73 (22 Mar 2018 10:00) (67 - 89)  BP: 103/41 (22 Mar 2018 10:00) (100/72 - 134/52)  BP(mean): 59 (22 Mar 2018 10:00) (59 - 87)  RR: 21 (22 Mar 2018 10:00) (13 - 23)  SpO2: 100% (22 Mar 2018 10:00) (93% - 100%)    PHYSICAL EXAM:  GENERAL: NAD, well-groomed, well-developed, frail  HEAD:  Atraumatic, Normocephalic  EYES: EOMI, PERRLA, conjunctiva and sclera clear  ENMT: Moist mucous membranes,   NECK: Supple, No JVD, Normal thyroid  NERVOUS SYSTEM: confused frail  Motor Strength 3/5 B/L upper and lower extremities; DTRs 2+ intact and symmetric  CHEST/LUNG: BS decreased at bases  HEART: Regular rate and rhythm; No murmurs, rubs, or gallops  ABDOMEN: Soft, Nontender, Nondistended; Bowel sounds present  EXTREMITIES:  2+ Peripheral Pulses, No clubbing, cyanosis, or edema, ecchymosis on legs  LYMPH: No lymphadenopathy noted  SKIN: No rashes or lesions    LABS:    03-22    151<H>  |  110<H>  |  37<H>  ----------------------------<  124<H>  3.3<L>   |  36<H>  |  0.71    Ca    8.4      22 Mar 2018 06:37          CAPILLARY BLOOD GLUCOSE            Culture - Urine (collected 03-15-18 @ 21:40)  Source: .Urine Catheterized  Final Report (03-17-18 @ 17:20):    >100,000 CFU/ml Escherichia coli ESBL  Organism: Escherichia coli ESBL (03-17-18 @ 17:20)  Organism: Escherichia coli ESBL (03-17-18 @ 17:20)      -  Amikacin: S <=8      -  Ampicillin: R >16      -  Ampicillin/Sulbactam: R <=4/2      -  Aztreonam: R >16      -  Cefazolin: R >16      -  Cefepime: R >16      -  Cefoxitin: S 8      -  Ceftazidime: R 4      -  Ceftriaxone: R >32      -  Ciprofloxacin: R >2      -  Ertapenem: S <=0.5      -  Gentamicin: S <=1      -  Imipenem: S <=1      -  Levofloxacin: R >4      -  Meropenem: S <=1      -  Nitrofurantoin: S <=32      -  Piperacillin/Tazobactam: R <=8      -  Tobramycin: S <=2      -  Trimethoprim/Sulfamethoxazole: R >2/38      Method Type: ANN    Culture - Blood (collected 03-15-18 @ 21:36)  Source: .Blood Blood  Final Report (03-20-18 @ 22:00):    No growth at 5 days.    Culture - Blood (collected 03-15-18 @ 21:36)  Source: .Blood Blood  Final Report (03-20-18 @ 22:00):    No growth at 5 days.        I&O's Summary    21 Mar 2018 07:01  -  22 Mar 2018 07:00  --------------------------------------------------------  IN: 570 mL / OUT: 0 mL / NET: 570 mL    22 Mar 2018 07:01  -  22 Mar 2018 10:40  --------------------------------------------------------  IN: 140 mL / OUT: 0 mL / NET: 140 mL        RADIOLOGY & ADDITIONAL TESTS:    Imaging Personally Reviewed:  [x ] YES  [ ] NO    Consultant(s) Notes Reviewed:  [x ] YES  [ ] NO    Care Discussed with Consultants/Other Providers [x ] YES  [ ] NO

## 2018-03-22 NOTE — PROGRESS NOTE ADULT - ASSESSMENT
89 yr ol resident of [facility], with PMH of Accelerated hypertension  Gastric reflux H/O TIA (transient ischemic attack) and stroke  History of mitral valve prolapse  Hypothyroid  Parkinson's disease  was having SOB, and was found to be hypoxic in facility and sent to hospital, In Ed pt is sob, oxygenating well, but markedly congested , altered mental state and had elevated D Dimer had CT angio has pleural effusion left more than rt.      Suspected CHf acute TTE ordered, cardio consult called, had b/l pleural effusion,        admitted with acute respiratory distress with b/l pleural effusion with possible chf with H/o HTN with MVP, PE ruled out  has hMPV virus pneumonia with left interstitiai, secondary bacterial PNA with severe aortic stenosis, B/l pleural effusion left >rt, with moderate pulmonary HTN with diastolic dysfunction, with possible uti e coli ESBl in urine, started on  levofloxacin in ED,had  ID consult,and abx changed to ertapanem  swallowing assesement, out of bed,BIPAP as needed as per pulmonary, has soft abdominal distension non tender had  US abd no abnormality except cholelithiasis asymptomatic      Clinically stable

## 2018-03-23 ENCOUNTER — TRANSCRIPTION ENCOUNTER (OUTPATIENT)
Age: 83
End: 2018-03-23

## 2018-03-23 VITALS
OXYGEN SATURATION: 98 % | HEART RATE: 80 BPM | DIASTOLIC BLOOD PRESSURE: 62 MMHG | SYSTOLIC BLOOD PRESSURE: 111 MMHG | RESPIRATION RATE: 18 BRPM | TEMPERATURE: 98 F

## 2018-03-23 LAB
ANION GAP SERPL CALC-SCNC: 1 MMOL/L — LOW (ref 5–17)
BUN SERPL-MCNC: 30 MG/DL — HIGH (ref 7–23)
CALCIUM SERPL-MCNC: 8.3 MG/DL — LOW (ref 8.4–10.5)
CHLORIDE SERPL-SCNC: 111 MMOL/L — HIGH (ref 96–108)
CO2 SERPL-SCNC: 35 MMOL/L — HIGH (ref 22–31)
CREAT SERPL-MCNC: 0.67 MG/DL — SIGNIFICANT CHANGE UP (ref 0.5–1.3)
GLUCOSE SERPL-MCNC: 109 MG/DL — HIGH (ref 70–99)
HCT VFR BLD CALC: 35.3 % — SIGNIFICANT CHANGE UP (ref 34.5–45)
HGB BLD-MCNC: 11.6 G/DL — SIGNIFICANT CHANGE UP (ref 11.5–15.5)
MCHC RBC-ENTMCNC: 32.7 GM/DL — SIGNIFICANT CHANGE UP (ref 32–36)
MCHC RBC-ENTMCNC: 32.8 PG — SIGNIFICANT CHANGE UP (ref 27–34)
MCV RBC AUTO: 100.4 FL — HIGH (ref 80–100)
PLATELET # BLD AUTO: 114 K/UL — LOW (ref 150–400)
POTASSIUM SERPL-MCNC: 3.9 MMOL/L — SIGNIFICANT CHANGE UP (ref 3.5–5.3)
POTASSIUM SERPL-SCNC: 3.9 MMOL/L — SIGNIFICANT CHANGE UP (ref 3.5–5.3)
RBC # BLD: 3.52 M/UL — LOW (ref 3.8–5.2)
RBC # FLD: 14.7 % — HIGH (ref 10.3–14.5)
SODIUM SERPL-SCNC: 147 MMOL/L — HIGH (ref 135–145)
WBC # BLD: 8.5 K/UL — SIGNIFICANT CHANGE UP (ref 3.8–10.5)
WBC # FLD AUTO: 8.5 K/UL — SIGNIFICANT CHANGE UP (ref 3.8–10.5)

## 2018-03-23 PROCEDURE — 71045 X-RAY EXAM CHEST 1 VIEW: CPT

## 2018-03-23 PROCEDURE — 97110 THERAPEUTIC EXERCISES: CPT

## 2018-03-23 PROCEDURE — 84484 ASSAY OF TROPONIN QUANT: CPT

## 2018-03-23 PROCEDURE — 85027 COMPLETE CBC AUTOMATED: CPT

## 2018-03-23 PROCEDURE — 87086 URINE CULTURE/COLONY COUNT: CPT

## 2018-03-23 PROCEDURE — 71275 CT ANGIOGRAPHY CHEST: CPT

## 2018-03-23 PROCEDURE — 96374 THER/PROPH/DIAG INJ IV PUSH: CPT

## 2018-03-23 PROCEDURE — 87486 CHLMYD PNEUM DNA AMP PROBE: CPT

## 2018-03-23 PROCEDURE — 99285 EMERGENCY DEPT VISIT HI MDM: CPT | Mod: 25

## 2018-03-23 PROCEDURE — 83880 ASSAY OF NATRIURETIC PEPTIDE: CPT

## 2018-03-23 PROCEDURE — 81001 URINALYSIS AUTO W/SCOPE: CPT

## 2018-03-23 PROCEDURE — 97116 GAIT TRAINING THERAPY: CPT

## 2018-03-23 PROCEDURE — 87633 RESP VIRUS 12-25 TARGETS: CPT

## 2018-03-23 PROCEDURE — 83605 ASSAY OF LACTIC ACID: CPT

## 2018-03-23 PROCEDURE — 87186 SC STD MICRODIL/AGAR DIL: CPT

## 2018-03-23 PROCEDURE — 87581 M.PNEUMON DNA AMP PROBE: CPT

## 2018-03-23 PROCEDURE — 82553 CREATINE MB FRACTION: CPT

## 2018-03-23 PROCEDURE — 85379 FIBRIN DEGRADATION QUANT: CPT

## 2018-03-23 PROCEDURE — 36415 COLL VENOUS BLD VENIPUNCTURE: CPT

## 2018-03-23 PROCEDURE — 97530 THERAPEUTIC ACTIVITIES: CPT

## 2018-03-23 PROCEDURE — 83615 LACTATE (LD) (LDH) ENZYME: CPT

## 2018-03-23 PROCEDURE — 94668 MNPJ CHEST WALL SBSQ: CPT

## 2018-03-23 PROCEDURE — 93306 TTE W/DOPPLER COMPLETE: CPT

## 2018-03-23 PROCEDURE — 82550 ASSAY OF CK (CPK): CPT

## 2018-03-23 PROCEDURE — 97163 PT EVAL HIGH COMPLEX 45 MIN: CPT

## 2018-03-23 PROCEDURE — 80053 COMPREHEN METABOLIC PANEL: CPT

## 2018-03-23 PROCEDURE — 94660 CPAP INITIATION&MGMT: CPT

## 2018-03-23 PROCEDURE — 94640 AIRWAY INHALATION TREATMENT: CPT

## 2018-03-23 PROCEDURE — 93005 ELECTROCARDIOGRAM TRACING: CPT

## 2018-03-23 PROCEDURE — 71045 X-RAY EXAM CHEST 1 VIEW: CPT | Mod: 26

## 2018-03-23 PROCEDURE — 80048 BASIC METABOLIC PNL TOTAL CA: CPT

## 2018-03-23 PROCEDURE — 76700 US EXAM ABDOM COMPLETE: CPT

## 2018-03-23 PROCEDURE — 85610 PROTHROMBIN TIME: CPT

## 2018-03-23 PROCEDURE — 86140 C-REACTIVE PROTEIN: CPT

## 2018-03-23 PROCEDURE — 94760 N-INVAS EAR/PLS OXIMETRY 1: CPT

## 2018-03-23 PROCEDURE — 87040 BLOOD CULTURE FOR BACTERIA: CPT

## 2018-03-23 PROCEDURE — 70450 CT HEAD/BRAIN W/O DYE: CPT

## 2018-03-23 PROCEDURE — 94667 MNPJ CHEST WALL 1ST: CPT

## 2018-03-23 PROCEDURE — 83735 ASSAY OF MAGNESIUM: CPT

## 2018-03-23 PROCEDURE — 85730 THROMBOPLASTIN TIME PARTIAL: CPT

## 2018-03-23 PROCEDURE — 87798 DETECT AGENT NOS DNA AMP: CPT

## 2018-03-23 RX ORDER — CARBIDOPA AND LEVODOPA 25; 100 MG/1; MG/1
2 TABLET ORAL
Qty: 0 | Refills: 0 | COMMUNITY

## 2018-03-23 RX ORDER — CARBIDOPA AND LEVODOPA 25; 100 MG/1; MG/1
1 TABLET ORAL
Qty: 0 | Refills: 0 | COMMUNITY
Start: 2018-03-23

## 2018-03-23 RX ORDER — ENOXAPARIN SODIUM 100 MG/ML
30 INJECTION SUBCUTANEOUS
Qty: 0 | Refills: 0 | COMMUNITY
Start: 2018-03-23

## 2018-03-23 RX ORDER — SENNA PLUS 8.6 MG/1
2 TABLET ORAL
Qty: 0 | Refills: 0 | COMMUNITY

## 2018-03-23 RX ORDER — MAGNESIUM HYDROXIDE 400 MG/1
0 TABLET, CHEWABLE ORAL
Qty: 0 | Refills: 0 | COMMUNITY

## 2018-03-23 RX ORDER — CARBIDOPA AND LEVODOPA 25; 100 MG/1; MG/1
2 TABLET ORAL
Qty: 0 | Refills: 0 | COMMUNITY
Start: 2018-03-23

## 2018-03-23 RX ORDER — LORATADINE 10 MG/1
1 TABLET ORAL
Qty: 0 | Refills: 0 | COMMUNITY

## 2018-03-23 RX ORDER — LEVOTHYROXINE SODIUM 125 MCG
1 TABLET ORAL
Qty: 0 | Refills: 0 | COMMUNITY

## 2018-03-23 RX ORDER — ESCITALOPRAM OXALATE 10 MG/1
1 TABLET, FILM COATED ORAL
Qty: 0 | Refills: 0 | COMMUNITY
Start: 2018-03-23

## 2018-03-23 RX ORDER — ESCITALOPRAM OXALATE 10 MG/1
1 TABLET, FILM COATED ORAL
Qty: 0 | Refills: 0 | COMMUNITY

## 2018-03-23 RX ORDER — ASPIRIN/ACETAMINOPHEN/CAFFEINE 250-250-65
0 TABLET ORAL
Qty: 0 | Refills: 0 | COMMUNITY

## 2018-03-23 RX ORDER — LEVOTHYROXINE SODIUM 125 MCG
1 TABLET ORAL
Qty: 0 | Refills: 0 | COMMUNITY
Start: 2018-03-23

## 2018-03-23 RX ORDER — CARBIDOPA AND LEVODOPA 25; 100 MG/1; MG/1
1 TABLET ORAL
Qty: 0 | Refills: 0 | COMMUNITY

## 2018-03-23 RX ORDER — POTASSIUM CHLORIDE 20 MEQ
20 PACKET (EA) ORAL ONCE
Qty: 0 | Refills: 0 | Status: COMPLETED | OUTPATIENT
Start: 2018-03-23 | End: 2018-03-23

## 2018-03-23 RX ORDER — CARBIDOPA AND LEVODOPA 25; 100 MG/1; MG/1
0.5 TABLET ORAL
Qty: 0 | Refills: 0 | COMMUNITY
Start: 2018-03-23

## 2018-03-23 RX ORDER — SODIUM CHLORIDE 0.65 %
1 AEROSOL, SPRAY (ML) NASAL
Qty: 0 | Refills: 0 | COMMUNITY
Start: 2018-03-23

## 2018-03-23 RX ORDER — DOCUSATE SODIUM 100 MG
0 CAPSULE ORAL
Qty: 0 | Refills: 0 | COMMUNITY

## 2018-03-23 RX ADMIN — Medication 1 TABLET(S): at 08:37

## 2018-03-23 RX ADMIN — Medication 5 MILLIGRAM(S): at 05:50

## 2018-03-23 RX ADMIN — ENOXAPARIN SODIUM 30 MILLIGRAM(S): 100 INJECTION SUBCUTANEOUS at 12:09

## 2018-03-23 RX ADMIN — CARBIDOPA AND LEVODOPA 2 TABLET(S): 25; 100 TABLET ORAL at 12:09

## 2018-03-23 RX ADMIN — Medication 100 MILLIGRAM(S): at 05:50

## 2018-03-23 RX ADMIN — CARBIDOPA AND LEVODOPA 2 TABLET(S): 25; 100 TABLET ORAL at 08:37

## 2018-03-23 RX ADMIN — CARBIDOPA AND LEVODOPA 0.5 TABLET(S): 25; 100 TABLET ORAL at 05:50

## 2018-03-23 RX ADMIN — Medication 81 MILLIGRAM(S): at 12:08

## 2018-03-23 RX ADMIN — CARBIDOPA AND LEVODOPA 2 TABLET(S): 25; 100 TABLET ORAL at 16:51

## 2018-03-23 RX ADMIN — Medication 1 TABLET(S): at 16:51

## 2018-03-23 RX ADMIN — Medication 20 MILLIEQUIVALENT(S): at 12:08

## 2018-03-23 RX ADMIN — Medication 100 MICROGRAM(S): at 05:49

## 2018-03-23 RX ADMIN — Medication 1 TABLET(S): at 12:08

## 2018-03-23 RX ADMIN — ESCITALOPRAM OXALATE 10 MILLIGRAM(S): 10 TABLET, FILM COATED ORAL at 12:08

## 2018-03-23 NOTE — PROGRESS NOTE ADULT - PROBLEM SELECTOR PROBLEM 4
Parkinson's disease
Parkinson's disease
Essential hypertension
Parkinson's disease
Parkinson's disease
Essential hypertension
Parkinson's disease

## 2018-03-23 NOTE — DISCHARGE NOTE ADULT - PLAN OF CARE
improved completed abx ertapanem, and advised chest x ray in 4 weeks supportive care cardio f up avoid overhydration enalapril levothyroxin continue carbidopa levodopa as bewfore admission resolved,

## 2018-03-23 NOTE — DISCHARGE NOTE ADULT - MEDICATION SUMMARY - MEDICATIONS TO TAKE
I will START or STAY ON the medications listed below when I get home from the hospital:    aspirin 81 mg oral delayed release capsule  --  by mouth once a day  -- Indication: For Atheroscklerosis    enalapril 5 mg oral tablet  -- 1 tab(s) by mouth once a day  -- Indication: For Htn, chf    enoxaparin  -- 30 milligram(s) subcutaneous once a day  -- Indication: For Dvt prophy    escitalopram 10 mg oral tablet  -- 1 tab(s) by mouth once a day  -- Indication: For Depresiion    carbidopa-levodopa 25 mg-100 mg oral tablet  -- 2 tab(s) by mouth   -- Indication: For Parkinson's disease    carbidopa-levodopa 50 mg-200 mg oral tablet, extended release  -- 1 tab(s) by mouth   -- Indication: For Parkinson's disease    carbidopa-levodopa 50 mg-200 mg oral tablet, extended release  -- 0.5 tab(s) by mouth once a day  -- Indication: For Parkinson's disease    carbidopa-levodopa 25 mg-100 mg oral tablet  -- 2  by mouth 4 times a day  -- Indication: For Parkinson's disease    carbidopa-levodopa 50 mg-200 mg oral tablet, extended release  -- 1 tab(s) by mouth every 6 hours  -- Indication: For Parkinson's disease    zinc oxide topical ointment  -- Indication: For skin care    Colace 100 mg oral capsule  --  by mouth 3 times a day  -- Indication: For Constipation    MiraLax oral powder for reconstitution  -- Indication: For Constipation    Senna 8.6 mg oral tablet  -- 1 tab(s) by mouth once a day (at bedtime)  -- Indication: For Const    Flonase 50 mcg/inh nasal spray  -- 1 spray(s) into nose once a day  -- Indication: For Allergy    sodium chloride 0.65% nasal spray  -- 1 spray(s) into nose 2 times a day  -- Indication: For Dry nares    Systane ophthalmic solution  -- 1 drop(s) to each affected eye 2 times a day  -- Indication: For Dry eyes    omeprazole 20 mg oral delayed release capsule  --  by mouth 2 times a day  -- Indication: For GERD    levothyroxine 100 mcg (0.1 mg) oral tablet  -- 1 tab(s) by mouth once a day  -- Indication: For Hypothyroidism, unspecified type    Centrum oral tablet, chewable  -- 1 tab(s) by mouth once a day  -- Indication: For suppl    Ocuvite Lutein oral capsule  -- 1 cap(s) by mouth 2 times a day  -- Indication: For suppl    ergocalciferol 50,000 intl units (1.25 mg) oral capsule  -- 1 cap(s) by mouth every other week  -- Indication: For suppl I will START or STAY ON the medications listed below when I get home from the hospital:    aspirin 81 mg oral delayed release capsule  --  by mouth once a day  -- Indication: For Atheroscklerosis    enalapril 5 mg oral tablet  -- 1 tab(s) by mouth once a day  -- Indication: For Htn, chf    enoxaparin  -- 30 milligram(s) subcutaneous once a day  -- Indication: For Dvt prophy    escitalopram 10 mg oral tablet  -- 1 tab(s) by mouth once a day  -- Indication: For Depresiion    carbidopa-levodopa 25 mg-100 mg oral tablet  -- 2 tab(s) by mouth 4 times a day at 9 ,13, 17, 21 hrs  -- Indication: For Parkinson's disease    carbidopa-levodopa 50 mg-200 mg oral tablet, extended release  -- 1 tab(s) by mouth once a day at 22 hrs  -- Indication: For Parkinson's disease    carbidopa-levodopa 50 mg-200 mg oral tablet, extended release  -- 0.5 tab(s) by mouth once a day at 7.30  -- Indication: For Parkinson's disease    zinc oxide topical ointment  -- Indication: For skin care    MiraLax oral powder for reconstitution  -- Indication: For Constipation    Senna 8.6 mg oral tablet  -- 1 tab(s) by mouth once a day (at bedtime)  -- Indication: For Const    Colace 100 mg oral capsule  --  by mouth 3 times a day  -- Indication: For Constipation    Flonase 50 mcg/inh nasal spray  -- 1 spray(s) into nose once a day  -- Indication: For Allergy    sodium chloride 0.65% nasal spray  -- 1 spray(s) into nose 2 times a day  -- Indication: For Dry nares    Systane ophthalmic solution  -- 1 drop(s) to each affected eye 2 times a day  -- Indication: For Dry eyes    omeprazole 20 mg oral delayed release capsule  --  by mouth 2 times a day  -- Indication: For GERD    levothyroxine 100 mcg (0.1 mg) oral tablet  -- 1 tab(s) by mouth once a day  -- Indication: For Hypothyroidism, unspecified type    Centrum oral tablet, chewable  -- 1 tab(s) by mouth once a day  -- Indication: For suppl    Ocuvite Lutein oral capsule  -- 1 cap(s) by mouth 2 times a day  -- Indication: For suppl    ergocalciferol 50,000 intl units (1.25 mg) oral capsule  -- 1 cap(s) by mouth every other week  -- Indication: For suppl

## 2018-03-23 NOTE — PROGRESS NOTE ADULT - PROBLEM SELECTOR PROBLEM 7
Human metapneumovirus (hMPV) pneumonia

## 2018-03-23 NOTE — DISCHARGE NOTE ADULT - MEDICATION SUMMARY - MEDICATIONS TO STOP TAKING
I will STOP taking the medications listed below when I get home from the hospital:    acetaminophen-diphenhydramine 500 mg-25 mg oral capsule    Milk of Magnesia    Ceftin 500 mg oral tablet  -- 1 tab(s) by mouth 2 times a day.  IF not covered, can substitute with Keflex 500mg four times a day. Aware of PCN allergy.  -- Finish all this medication unless otherwise directed by prescriber.  Medication should be taken with plenty of water.  Take with food or milk.

## 2018-03-23 NOTE — PROGRESS NOTE ADULT - ATTENDING COMMENTS
37 minutes spent on this visit, 50% visit time spent in care co-ordination with other attendings and counselling patient  I have discussed care plan with patient and HCP,expressed understanding of problems treatment and their effect and side effects, alternatives in detail,I have asked if they have any questions and concerns and appropriately addressed them to best of my ability  Reviewed all diagonostic tests, lab results and drug drug interactions, and medications
60 minutes spent on this visit, 50% visit time spent in care co-ordination with other attendings and counselling patient  I have discussed care plan with patient and HCP,expressed understanding of problems treatment and their effect and side effects, alternatives in detail,I have asked if they have any questions and concerns and appropriately addressed them to best of my ability  Reviewed all diagonostic tests, lab results and drug drug interactions, and medications
45 minutes spent on this visit, 50% visit time spent in care co-ordination with other attendings and counselling patient  I have discussed care plan with patient and HCP,expressed understanding of problems treatment and their effect and side effects, alternatives in detail,I have asked if they have any questions and concerns and appropriately addressed them to best of my ability  Reviewed all diagonostic tests, lab results and drug drug interactions, and medications
44 minutes spent on this visit, 50% visit time spent in care co-ordination with other attendings and counselling patient  I have discussed care plan with patient and HCP,expressed understanding of problems treatment and their effect and side effects, alternatives in detail,I have asked if they have any questions and concerns and appropriately addressed them to best of my ability  Reviewed all diagonostic tests, lab results and drug drug interactions, and medications
45 minutes spent on this visit, 50% visit time spent in care co-ordination with other attendings and counselling patient  I have discussed care plan with patient and HCP,expressed understanding of problems treatment and their effect and side effects, alternatives in detail,I have asked if they have any questions and concerns and appropriately addressed them to best of my ability  Reviewed all diagonostic tests, lab results and drug drug interactions, and medications
45 minutes spent on this visit, 50% visit time spent in care co-ordination with other attendings and counselling patient  I have discussed care plan with patient and HCP,expressed understanding of problems treatment and their effect and side effects, alternatives in detail,I have asked if they have any questions and concerns and appropriately addressed them to best of my ability  Reviewed all diagonostic tests, lab results and drug drug interactions, and medications

## 2018-03-23 NOTE — PROGRESS NOTE ADULT - SUBJECTIVE AND OBJECTIVE BOX
Patient is a 89y old  Female who presents with a chief complaint of sob, Hypoxia (15 Mar 2018 15:43)      INTERVAL HPI/OVERNIGHT EVENTS:no acute event overnight , pt moved to [facility] from ICU    Home Medications:  acetaminophen-diphenhydramine 500 mg-25 mg oral capsule:  (03 Dec 2017 18:30)  aspirin 81 mg oral delayed release capsule:  orally once a day (03 Dec 2017 18:30)  carbidopa-levodopa 25 mg-100 mg oral tablet: 2  orally 4 times a day (03 Dec 2017 18:30)  carbidopa-levodopa 50 mg-200 mg oral tablet, extended release: 1 tab(s) orally every 6 hours (03 Dec 2017 18:30)  Centrum oral tablet, chewable: 1 tab(s) orally once a day (03 Dec 2017 18:30)  Colace 100 mg oral capsule:  orally 3 times a day (03 Dec 2017 18:00)  docusate potassium 100 mg oral capsule:  (03 Dec 2017 18:30)  enalapril 5 mg oral tablet: 1 tab(s) orally once a day (03 Dec 2017 18:30)  ergocalciferol 50,000 intl units (1.25 mg) oral capsule: 1 cap(s) orally every other week (03 Dec 2017 18:30)  Flonase 50 mcg/inh nasal spray: 1 spray(s) nasal once a day (03 Dec 2017 18:30)  levothyroxine 100 mcg (0.1 mg) oral tablet: 1 tab(s) orally once a day (03 Dec 2017 18:30)  Lexapro 10 mg oral tablet: 1 tab(s) orally once a day (03 Dec 2017 18:30)  loratadine 10 mg oral tablet: 1 tab(s) orally once a day (03 Dec 2017 18:30)  Milk of Magnesia:  (03 Dec 2017 18:30)  MiraLax oral powder for reconstitution:  (03 Dec 2017 18:30)  Ocuvite Lutein oral capsule: 1 cap(s) orally 2 times a day (03 Dec 2017 18:00)  omeprazole 20 mg oral delayed release capsule:  orally 2 times a day (03 Dec 2017 18:00)  polyethylene glycol 3350 oral powder for reconstitution:  orally every 3 days (03 Dec 2017 18:00)  senna 8.6 mg oral tablet: 2  orally once a day (at bedtime) (03 Dec 2017 18:30)  Senna 8.6 mg oral tablet: 1 tab(s) orally once a day (at bedtime) (03 Dec 2017 18:30)  Systane ophthalmic solution: 1 drop(s) to each affected eye 2 times a day (03 Dec 2017 18:00)  Systane ophthalmic solution: 1 drop(s) to each affected eye 2 times a day (03 Dec 2017 18:30)  zinc oxide topical ointment:  (03 Dec 2017 18:30)      MEDICATIONS  (STANDING):  aspirin enteric coated 81 milliGRAM(s) Oral daily  carbidopa/levodopa  25/100 2 Tablet(s) Oral <User Schedule>  carbidopa/levodopa CR 50/200 1 Tablet(s) Oral <User Schedule>  carbidopa/levodopa CR 50/200 0.5 Tablet(s) Oral <User Schedule>  docusate sodium 100 milliGRAM(s) Oral two times a day  enalapril 5 milliGRAM(s) Oral daily  enoxaparin Injectable 30 milliGRAM(s) SubCutaneous daily  escitalopram 10 milliGRAM(s) Oral daily  lactobacillus acidophilus 1 Tablet(s) Oral three times a day with meals  levothyroxine 100 MICROGram(s) Oral daily  potassium chloride    Tablet ER 20 milliEquivalent(s) Oral once  sodium chloride 0.45%. 1000 milliLiter(s) (75 mL/Hr) IV Continuous <Continuous>  sodium chloride 0.65% Nasal 1 Spray(s) Both Nostrils two times a day    MEDICATIONS  (PRN):  polyethylene glycol 3350 17 Gram(s) Oral daily PRN Constipation      Allergies    beta blockers (Hives)  penicillin (Unknown)    Intolerances        REVIEW OF SYSTEMS:  CONSTITUTIONAL: No fever, weight loss, or fatigue  EYES: No eye pain, visual disturbances, or discharge  ENMT:  No difficulty hearing, tinnitus, vertigo; No sinus or throat pain  NECK: No pain or stiffness  BREASTS: No pain, masses, or nipple discharge  RESPIRATORY: No cough, wheezing, chills or hemoptysis; No shortness of breath  CARDIOVASCULAR: No chest pain, palpitations, dizziness, or leg swelling  GASTROINTESTINAL: No abdominal or epigastric pain. No nausea, vomiting, or hematemesis; No diarrhea or constipation. No melena or hematochezia.  GENITOURINARY: No dysuria, frequency, hematuria, or incontinence  NEUROLOGICAL: No headaches,has  memory loss, unable to ambulate  SKIN: No itching, burning, rashes, or lesions   LYMPH NODES: No enlarged glands  ENDOCRINE: No heat or cold intolerance; No hair loss  MUSCULOSKELETAL: No joint pain or swelling; No muscle, back, or extremity pain  PSYCHIATRIC: No depression, anxiety, mood swings, or difficulty sleeping  HEME/LYMPH: No easy bruising, or bleeding gums  ALLERGY AND IMMUNOLOGIC: No hives or eczema    Vital Signs Last 24 Hrs  T(C): 36.3 (23 Mar 2018 04:55), Max: 37.1 (22 Mar 2018 12:30)  T(F): 97.4 (23 Mar 2018 04:55), Max: 98.8 (22 Mar 2018 12:30)  HR: 66 (23 Mar 2018 04:55) (66 - 77)  BP: 146/66 (23 Mar 2018 04:55) (100/59 - 169/75)  BP(mean): 104 (22 Mar 2018 22:00) (59 - 104)  RR: 20 (23 Mar 2018 04:55) (15 - 28)  SpO2: 97% (23 Mar 2018 04:55) (89% - 100%)    PHYSICAL EXAM:  GENERAL: NAD, well-groomed, well-developed  HEAD:  Atraumatic, Normocephalic  EYES: EOMI, PERRLA, conjunctiva and sclera clear  ENMT: Moist mucous membranes,   NECK: Supple, No JVD, Normal thyroid  NERVOUS SYSTEM:  Alert & Oriented X1, Motor Strength 3/5 B/L upper and lower extremities; DTRs 2+ intact and symmetric  CHEST/LUNG: Clear to percussion bilaterally; No rales, rhonchi, wheezing, or rubs  HEART: Regular rate and rhythm; No murmurs, rubs, or gallops  ABDOMEN: Soft, Nontender, Nondistended; Bowel sounds present  EXTREMITIES:  2+ Peripheral Pulses, No clubbing, cyanosis, or edema  LYMPH: No lymphadenopathy noted  SKIN: No rashes or lesions    LABS:                        11.6   8.5   )-----------( 114      ( 23 Mar 2018 07:59 )             35.3     03-22    151<H>  |  110<H>  |  37<H>  ----------------------------<  124<H>  3.3<L>   |  36<H>  |  0.71    Ca    8.4      22 Mar 2018 06:37          CAPILLARY BLOOD GLUCOSE              I&O's Summary    22 Mar 2018 07:01  -  23 Mar 2018 07:00  --------------------------------------------------------  IN: 740 mL / OUT: 0 mL / NET: 740 mL    < from: Xray Chest 1 View- PORTABLE-Routine (03.21.18 @ 06:07) >    No significant interval change in the overall appearance of the chest.   Bibasilar airspace disease, effusions-consolidation. Upper portions of   the lungs normally expanded. Heart size difficult to evaluate, loss of   definition of left cardiac border. Aorta shows atherosclerotic changes.   No acute osseous abnormalities. Surgical clips visible in the left axilla.    < end of copied text >      RADIOLOGY & ADDITIONAL TESTS:    Imaging Personally Reviewed:  [x ] YES  [ ] NO    Consultant(s) Notes Reviewed:  [x ] YES  [ ] NO    Care Discussed with Consultants/Other Providers [x ] YES  [ ] NO

## 2018-03-23 NOTE — PROGRESS NOTE ADULT - PROBLEM SELECTOR PROBLEM 5
Hypothyroidism, unspecified type
Parkinson's disease
Hypothyroidism, unspecified type
Parkinson's disease
Hypothyroidism, unspecified type

## 2018-03-23 NOTE — PROGRESS NOTE ADULT - PROBLEM SELECTOR PROBLEM 2
Pleural effusion
Respiratory distress
Agitation
Aortic stenosis
Aortic valve stenosis, etiology of cardiac valve disease unspecified
Aortic valve stenosis, etiology of cardiac valve disease unspecified
Pleural effusion

## 2018-03-23 NOTE — PROGRESS NOTE ADULT - PROBLEM SELECTOR PLAN 3
Probable moderate.  No intervention planned
enalapril
enalapril
-contact precautions  synptomatic management
enalapril
enalapril
Severe on Echo, but moderate on physical exam.  No intervention planned.
enalapril
stable no   pulmonary following , may need drainage in future
enalapril

## 2018-03-23 NOTE — PROGRESS NOTE ADULT - PROBLEM SELECTOR PLAN 6
supportive care

## 2018-03-23 NOTE — PROGRESS NOTE ADULT - PROBLEM SELECTOR PROBLEM 1
Human metapneumovirus (hMPV) pneumonia
Respiratory distress
Dementia with behavioral disturbance, unspecified dementia type
Dementia with behavioral disturbance, unspecified dementia type
Human metapneumovirus (hMPV) pneumonia
Respiratory distress
Human metapneumovirus (hMPV) pneumonia
R/O Acute respiratory failure
Respiratory distress
R/O Acute respiratory failure
Respiratory distress
R/O Acute respiratory failure

## 2018-03-23 NOTE — PROGRESS NOTE ADULT - PROBLEM SELECTOR PROBLEM 9
Agitation
Chronic diastolic congestive heart failure
Chronic diastolic congestive heart failure
Agitation
Chronic diastolic congestive heart failure

## 2018-03-23 NOTE — PROGRESS NOTE ADULT - ASSESSMENT
89 yr ol resident of [facility], with PMH of Accelerated hypertension  Gastric reflux H/O TIA (transient ischemic attack) and stroke  History of mitral valve prolapse  Hypothyroid  Parkinson's disease  was having SOB, and was found to be hypoxic in facility and sent to hospital, In Ed pt is sob, oxygenating well, but markedly congested , altered mental state and had elevated D Dimer had CT angio has pleural effusion left more than rt.      Suspected CHf acute TTE ordered, cardio consult called, had b/l pleural effusion,        admitted with acute respiratory distress with b/l pleural effusion with possible chf with H/o HTN with MVP, PE ruled out  has hMPV virus pneumonia with left interstitiai, secondary bacterial PNA with severe aortic stenosis, B/l pleural effusion left >rt, with moderate pulmonary HTN with diastolic dysfunction, with possible uti e coli ESBl in urine, started on  levofloxacin in ED,had  ID consult,and abx changed to ertapanem  swallowing assesement, out of bed,BIPAP as needed as per pulmonary, has soft abdominal distension non tender had  US abd no abnormality except cholelithiasis asymptomatic      Clinically stable

## 2018-03-23 NOTE — PROGRESS NOTE ADULT - PROBLEM SELECTOR PLAN 8
cardio f up noted no added tt

## 2018-03-23 NOTE — DISCHARGE NOTE ADULT - SECONDARY DIAGNOSIS.
Dementia with behavioral disturbance, unspecified dementia type Aortic valve stenosis, etiology of cardiac valve disease unspecified Chronic diastolic congestive heart failure Hypothyroidism, unspecified type Parkinson's disease Urinary tract infection without hematuria, site unspecified

## 2018-03-23 NOTE — PROGRESS NOTE ADULT - PROBLEM SELECTOR PROBLEM 10
Chronic diastolic congestive heart failure
Urinary tract infection without hematuria, site unspecified
Urinary tract infection without hematuria, site unspecified
Chronic diastolic congestive heart failure
Urinary tract infection without hematuria, site unspecified

## 2018-03-23 NOTE — PROGRESS NOTE ADULT - PROBLEM SELECTOR PROBLEM 3
Essential hypertension
Human metapneumovirus (hMPV) pneumonia
Aortic stenosis
Essential hypertension
Pleural effusion
Aortic stenosis
Essential hypertension

## 2018-03-23 NOTE — PROGRESS NOTE ADULT - PROBLEM SELECTOR PLAN 10
diuresis as needed, ACE inhibitor to continue
esbl e coli , on ertapanem
esbl e coli , on ertapanem
diuresis as needed, ACE inhibitor to continue
Off diuresis
diuresis as needed, ACE inhibitor to continue
esbl e coli , on ertapanem

## 2018-03-23 NOTE — PROGRESS NOTE ADULT - PROBLEM SELECTOR PROBLEM 8
Aortic valve stenosis, etiology of cardiac valve disease unspecified

## 2018-03-23 NOTE — PROGRESS NOTE ADULT - ASSESSMENT
89 yr ol resident of [facility], with PMH of Accelerated hypertension  Gastric reflux H/O TIA (transient ischemic attack) and stroke  History of mitral valve prolapse  Hypothyroid  Parkinson's disease  was having SOB, and was found to be hypoxic in facility and sent to hospital, In Ed pt is sob, oxygenating well, but markedly congested , altered mental state and had elevated D Dimer had CT angio has pleural effusion left more than rt.  . Unremarkable noncontrast CT scan of the brain.     suspected CHf acute TTE ordered, cardio consult called, had b/l pleural effusion,  mild increase in LFT.  admitted with acute respiratory failure  with b/l pleural effusion with possible chf with H/o HTN with MVP, PE ruled out   hMPV virus pneumonia with left interstitiai, secondary bacterial PNA with severe aortic stenosis, B/l pleural effusion left >rt, with moderate pulmonary HTN with diastolic dysfunction, with possible uti e coli ESBl in urine, started on  levofloxacin in ED,had  ID consult,and abx changed to ertapanem .  Had  swallowing assesement,  orophryngeal dysphagia on puree diet with thickened liquids  - pt off oxygen, on  ertapanem for ESBL uti and secondary PNA with pleural effusion , completed on  7/7,  Diastolic ac on ch  CHF improved ,parkinsons stable with tt as advised by neuro, dementia with behav disorder stable with supportive care, will cont levothyroxin for hypothyroidism, hypernatremia and hypokalemia improved ,  patient generally better,   patient planned for thoracentesis as per pulmonary family refused,   stable for DC to rehab.  d/w grandson who is ID physician, at [phone]  d/w with son Michael

## 2018-03-23 NOTE — PROGRESS NOTE ADULT - SUBJECTIVE AND OBJECTIVE BOX
Patient is a 89y Female with a known history of :  Urinary tract infection without hematuria, site unspecified (N39.0)  Chronic diastolic congestive heart failure (I50.32)  Aortic valve stenosis, etiology of cardiac valve disease unspecified (I35.0)  Dementia with behavioral disturbance, unspecified dementia type (F03.91)  Agitation (R45.1)  Aortic stenosis (I35.0)  Human metapneumovirus (hMPV) pneumonia (J12.3)  Hypothyroidism, unspecified type (E03.9)  Parkinson's disease (G20)  Essential hypertension (I10)  Pleural effusion (J90)  Respiratory distress (R06.03)    HPI:  89 yr ol resident of [facility] assisted living facility, with PMH of Accelerated hypertension  Gastric reflux H/O TIA (transient ischemic attack) and stroke  History of mitral valve prolapse  Hypothyroid  Parkinson's disease  was having SOB, and was found to be hypoxic in facility and sent to hospital, In Ed pt is sob, oxygenating well, but markedly congested , altered mental state and had elevated D Dimer had CT angio has pleural effusion left more than rt.  . Unremarkable noncontrast CT scan of the brain.     Likely having CHf acute TTE ordered, cardio consult called, had b/l pleural effusion, pulmonary consult called, mild increase in LFT.  [facility] has many pts with hMPV infection , will try to out viral illness,  pt denies any bowel or urinary complaint, no fever (15 Mar 2018 15:43)      REVIEW OF SYSTEMS:    CONSTITUTIONAL: No fever, weight loss, or fatigue  EYES: No eye pain, visual disturbances, or discharge  ENMT:  No difficulty hearing, tinnitus, vertigo; No sinus or throat pain  NECK: No pain or stiffness  BREASTS: No pain, masses, or nipple discharge  RESPIRATORY: No cough, wheezing, chills or hemoptysis; No shortness of breath  CARDIOVASCULAR: No chest pain, palpitations, dizziness, or leg swelling  GASTROINTESTINAL: No abdominal or epigastric pain. No nausea, vomiting, or hematemesis; No diarrhea or constipation. No melena or hematochezia.  GENITOURINARY: No dysuria, frequency, hematuria, or incontinence  NEUROLOGICAL: No headaches, memory loss, loss of strength, numbness, or tremors  SKIN: No itching, burning, rashes, or lesions   LYMPH NODES: No enlarged glands  ENDOCRINE: No heat or cold intolerance; No hair loss  MUSCULOSKELETAL: No joint pain or swelling; No muscle, back, or extremity pain  PSYCHIATRIC: No depression, anxiety, mood swings, or difficulty sleeping  HEME/LYMPH: No easy bruising, or bleeding gums  ALLERGY AND IMMUNOLOGIC: No hives or eczema    MEDICATIONS  (STANDING):  aspirin enteric coated 81 milliGRAM(s) Oral daily  carbidopa/levodopa  25/100 2 Tablet(s) Oral <User Schedule>  carbidopa/levodopa CR 50/200 1 Tablet(s) Oral <User Schedule>  carbidopa/levodopa CR 50/200 0.5 Tablet(s) Oral <User Schedule>  docusate sodium 100 milliGRAM(s) Oral two times a day  enalapril 5 milliGRAM(s) Oral daily  enoxaparin Injectable 30 milliGRAM(s) SubCutaneous daily  escitalopram 10 milliGRAM(s) Oral daily  lactobacillus acidophilus 1 Tablet(s) Oral three times a day with meals  levothyroxine 100 MICROGram(s) Oral daily  potassium chloride    Tablet ER 20 milliEquivalent(s) Oral once  sodium chloride 0.45%. 1000 milliLiter(s) (75 mL/Hr) IV Continuous <Continuous>  sodium chloride 0.65% Nasal 1 Spray(s) Both Nostrils two times a day    MEDICATIONS  (PRN):  polyethylene glycol 3350 17 Gram(s) Oral daily PRN Constipation      ALLERGIES: beta blockers (Hives)  penicillin (Unknown)      FAMILY HISTORY:      Social history:  Alochol:   Smoking:   Drug Use:   Marital Status:     PHYSICAL EXAMINATION:  -----------------------------  T(C): 36.3 (03-23-18 @ 04:55), Max: 37.1 (03-22-18 @ 12:30)  HR: 66 (03-23-18 @ 04:55) (66 - 77)  BP: 146/66 (03-23-18 @ 04:55) (100/59 - 169/75)  RR: 20 (03-23-18 @ 04:55) (15 - 28)  SpO2: 97% (03-23-18 @ 04:55) (89% - 100%)  Wt(kg): --    03-22 @ 07:01  -  03-23 @ 07:00  --------------------------------------------------------  IN:    Oral Fluid: 140 mL    sodium chloride 0.45%.: 600 mL  Total IN: 740 mL    OUT:  Total OUT: 0 mL    Total NET: 740 mL            Constitutional: well developed, normal appearance, well groomed, well nourished, no deformities and no acute distress.   Eyes: the conjunctiva exhibited no abnormalities and the eyelids demonstrated no xanthelasmas.   HEENT: normal oral mucosa, no oral pallor and no oral cyanosis.   Neck: normal jugular venous A waves present, normal jugular venous V waves present and no jugular venous alonzo A waves.   Pulmonary: no respiratory distress, normal respiratory rhythm and effort, no accessory muscle use and lungs were clear to auscultation bilaterally.   Cardiovascular: heart rate and rhythm were normal, decreased S1 and S2 with II/VI systolic murmur.   Musculoskeletal: the gait could not be assessed.   Extremities: no clubbing of the fingernails, no localized cyanosis, no petechial hemorrhages and no ischemic changes.   Skin: normal skin color and pigmentation, no rash, no venous stasis, no skin lesions, no skin ulcer and no xanthoma was observed.      LABS:   --------  03-22    151<H>  |  110<H>  |  37<H>  ----------------------------<  124<H>  3.3<L>   |  36<H>  |  0.71    Ca    8.4      22 Mar 2018 06:37                           11.6   8.5   )-----------( 114      ( 23 Mar 2018 07:59 )             35.3                   Radiology:

## 2018-03-23 NOTE — DISCHARGE NOTE ADULT - CARE PROVIDER_API CALL
Katie Snow (MD), Internal Medicine; Nephrology  [address] [facility]  [address] [place] [address]  Phone: [phone]  Fax: [phone]

## 2018-03-23 NOTE — DISCHARGE NOTE ADULT - HOSPITAL COURSE
89 yr ol resident of [facility], with PMH of Accelerated hypertension  Gastric reflux H/O TIA (transient ischemic attack) and stroke  History of mitral valve prolapse  Hypothyroid  Parkinson's disease  was having SOB, and was found to be hypoxic in facility and sent to hospital, In Ed pt is sob, oxygenating well, but markedly congested , altered mental state and had elevated D Dimer had CT angio has pleural effusion left more than rt.  . Unremarkable noncontrast CT scan of the brain.     suspected CHf acute TTE ordered, cardio consult called, had b/l pleural effusion,  mild increase in LFT.  admitted with acute respiratory failure  with b/l pleural effusion with possible chf with H/o HTN with MVP, PE ruled out   hMPV virus pneumonia with left interstitiai, secondary bacterial PNA with severe aortic stenosis, B/l pleural effusion left >rt, with moderate pulmonary HTN with diastolic dysfunction, with possible uti e coli ESBl in urine, started on  levofloxacin in ED,had  ID consult,and abx changed to ertapanem .  Had  swallowing assesement,  orophryngeal dysphagia on puree diet with thickened liquids  - pt off oxygen, on  ertapanem for ESBL uti and secondary PNA with pleural effusion , completed on  7/7,  Diastolic ac on ch  CHF improved ,parkinsons stable with tt as advised by neuro, dementia with behav disorder stable with supportive care, will cont levothyroxin for hypothyroidism, hypernatremia and hypokalemia improved ,  patient generally better,   patient planned for thoracentesis as per pulmonary family refused,   stable for DC to rehab.  d/w grandson who is ID physician, at [phone]  d/w with son Michael

## 2018-03-23 NOTE — DISCHARGE NOTE ADULT - PATIENT PORTAL LINK FT
You can access the [facility] Patient Portal, offered by [facility], by registering with the following website: [email or web address]

## 2018-03-23 NOTE — PROGRESS NOTE ADULT - ASSESSMENT
89y/o w/f seen at [facility] ICU.  History dementia, pleural effusions, thyroid disease, HTN, parkinson's disease, GERD, CVA.  Admitted with respiratory distress and positive rapid RVP.    3/23/18  Patient awake and alert.  Patient sitting in chair.  Monitor compatible with NSR.  No significant cardiac arrhythmias documented.  Echocardiogram compatible with severe AS and moderate Pulmonary HTN.    Plan:  - Continue present therapy.  - Awaiting today's potassium.  - Encourage nutrition.  - Out of bed with assistance if possible.  - Being followed by Pulmonary, ID and Neurology.  - Discharge planning.

## 2018-03-23 NOTE — PROGRESS NOTE ADULT - PROVIDER SPECIALTY LIST ADULT
Cardiology
Critical Care
Infectious Disease
Internal Medicine
MICU
Neurology
Pulmonology
Infectious Disease
Neurology
Cardiology
Cardiology
Pulmonology
Internal Medicine
Critical Care
Critical Care
Internal Medicine
Internal Medicine

## 2018-03-23 NOTE — PROGRESS NOTE ADULT - PROBLEM SELECTOR PLAN 2
pulmonary consult, may need thoracentesis, pulmonary f up
-intensivist has placed order for IR drainage of effusions on 3/16/18  -if significant distress develops may need bedside drainage
multi factorial  oral and skin care  keep sat > 88 pct  will dc Solumedrol  cont NEBS  chest pt  suction PRN  on emp ABX - ID following  cont LASIX for HF and volume overload  pt possibly for thoracentesis tomorrow - bilateral eff, left > right  will follow  discussed with ICU team
received diuresis, symptomatically better
received diuresis, symptomatically better
Chronic
No intervention planned.
Severe AS.  No intervention planned.
Severe.  No intervention planned
pt has history of dementia and is increasingly agitated tonight   will give small dose of haldol
pulmonary consult, may need thoracentesis, pulmonary f up
received diuresis, symptomatically better
received diuresis, symptomatically better  Family apparently refused thoracentesis
received diuresis, symptomatically better  Family apparently refused thoracentesis
Chronic
received diuresis, symptomatically better  Family apparently refused thoracentesis
received diuresis, symptomatically better
received diuresis, symptomatically better  Family apparently refused thoracentesis

## 2018-03-23 NOTE — PROGRESS NOTE ADULT - PROBLEM SELECTOR PLAN 7
continue care as per pulmonary,
Resolving.
continue care as per pulmonary,
Resolving.
Resolving.
continue care as per pulmonary,
Resolving.

## 2018-03-23 NOTE — PROGRESS NOTE ADULT - PROBLEM SELECTOR PLAN 1
r/oviarl infection PE ruled out, TTE to r/o chf, pulmonary consult , cardio consult  diastolic dysfunction , interstitial PNA hMPV viral pna, levofloxacin changed to ertapanem on 3/16
resp distress  I and O noted  oral and skin care  chest pt  NEBS and Systemic Steroids and Inhaler steroids  keep sat > 88 pct  BIPAP as needed  no volume overload documented on I and O this am  cont ICU Supportive care and regimen  am labs pending  pt remains full code  planned for thoracentesis with IR  will follow  prognosis POOR, advanced age and frail functional status
Patient currently on BiPAP  -will titrate IPAP and EPAP to maintain pH >7.30 and SaO2 >92%  -alarms reviewed  -NPO while on BiPAP
r/oviarl infection PE ruled out, TTE to r/o chf, pulmonary consult , cardio consult  diastolic dysfunction , interstitial PNA hMPV viral pna, levofloxacin changed to ertapanem on 3/16
r/oviarl infection PE ruled out, TTE to r/o chf, pulmonary consult , cardio consult  diastolic dysfunction , interstitial PNA hMPV viral pna, levofloxacin changed to ertapanem on 3/16, today 6/7 day course
resp viral infection, resp distress  on and off BIPAP  I and O  oral and skin care  chest pt  suction PRN  cont NEBS and Inhaled and Systemic Steroids  on LASIX, monitor I and O, keep in balance  overall prognosis poor - advanced age and multiple medical issues  cont current supportive ICU care  [facility]  will follow
supportive care and regimen  assist with ADL  oral and skin hygiene  prognosis poor  pt is full code  family aware of poor prognosis
supportive care and regimen  off BIPAP  I and O  nebs to help clear secretions  oral and skin hygiene  ABX as per ID  am labs pending  remains full code, conversation with son to update goals of care  prognosis POOR  cont ICU supportive care and regimen
Bilateral pleural effusions.  No cardiac objection to proposed thoracentesis.
Follow Pulmonary
Follow Pulmonary, Internal Med
Probably viral
Resolved  HMPV
Resolved  HMPV
continue Ertepenam  follow repeat chest x-ray  aspiration precautions including head of bed at 30 degrees
r/oviarl infection PE ruled out, TTE to r/o chf, pulmonary consult , cardio consult  diastolic dysfunction , interstitial PNA hMPV viral pna, levofloxacin
r/oviarl infection PE ruled out, TTE to r/o chf, pulmonary consult , cardio consult  diastolic dysfunction , interstitial PNA hMPV viral pna, levofloxacin changed to ertapanem on 3/16
Follow Pulmonary
Resolved  HMPV
r/oviarl infection PE ruled out, TTE to r/o chf, pulmonary consult , cardio consult  diastolic dysfunction , interstitial PNA hMPV viral pna, levofloxacin changed to ertapanem on 3/16, completed  7/7 day course  resolved DC paln
r/oviarl infection PE ruled out, TTE to r/o chf, pulmonary consult , cardio consult  diastolic dysfunction , interstitial PNA hMPV viral pna, levofloxacin changed to ertapanem on 3/16
r/oviarl infection PE ruled out, TTE to r/o chf, pulmonary consult , cardio consult  diastolic dysfunction , interstitial PNA hMPV viral pna, levofloxacin changed to ertapanem on 3/16, today 7/7 day course  resolved DC paln
Resolved  HMPV  D/c planning

## 2018-03-23 NOTE — PROGRESS NOTE ADULT - NSHPATTENDINGPLANDISCUSS_GEN_ALL_CORE
Dr. Arora and nursing in detail
beverly and DR Mills
beverly and DR Mills
beverly and DR Shaikh
beverly and DR Shaikh
nursing in detail
beverly and DR Shaikh
beverly and DR Shaikh
nursing in detail
Pa and nursing in detail
beverly and DR Shaikh
beverly and DR Shiakh

## 2018-03-23 NOTE — PROGRESS NOTE ADULT - PROBLEM SELECTOR PLAN 4
continue carbidopalevo, neuro consult
on enalapril
continue carbidopalevo, neuro consult
continue enalapril
continue carbidopalevo, neuro consult

## 2018-03-23 NOTE — DISCHARGE NOTE ADULT - CARE PLAN
Principal Discharge DX:	Pneumonia of both lower lobes due to infectious organism  Goal:	improved  Assessment and plan of treatment:	completed abx ertapanem, and advised chest x ray in 4 weeks  Secondary Diagnosis:	Dementia with behavioral disturbance, unspecified dementia type  Assessment and plan of treatment:	supportive care  Secondary Diagnosis:	Aortic valve stenosis, etiology of cardiac valve disease unspecified  Assessment and plan of treatment:	cardio f up  Secondary Diagnosis:	Chronic diastolic congestive heart failure  Assessment and plan of treatment:	avoid overhydration enalapril  Secondary Diagnosis:	Hypothyroidism, unspecified type  Assessment and plan of treatment:	levothyroxin  Secondary Diagnosis:	Parkinson's disease  Assessment and plan of treatment:	continue carbidopa levodopa as bewfore admission  Secondary Diagnosis:	Urinary tract infection without hematuria, site unspecified  Assessment and plan of treatment:	resolved,

## 2018-03-23 NOTE — PROGRESS NOTE ADULT - SUBJECTIVE AND OBJECTIVE BOX
PULMONARY/CRITICAL CARE      INTERVAL HPI/OVERNIGHT EVENTS:  No sob. Confused, responsive. No fever  89y FemaleHPI:  89 yr ol resident of [facility] assisted living facility, with PMH of Accelerated hypertension  Gastric reflux H/O TIA (transient ischemic attack) and stroke  History of mitral valve prolapse  Hypothyroid  Parkinson's disease  was having SOB, and was found to be hypoxic in facility and sent to hospital, In Ed pt is sob, oxygenating well, but markedly congested , altered mental state and had elevated D Dimer had CT angio has pleural effusion left more than rt.  . Unremarkable noncontrast CT scan of the brain.     Likely having CHf acute TTE ordered, cardio consult called, had b/l pleural effusion, pulmonary consult called, mild increase in LFT.  [facility] has many pts with hMPV infection , will try to out viral illness,  pt denies any bowel or urinary complaint, no fever (15 Mar 2018 15:43)        PAST MEDICAL & SURGICAL HISTORY:  History of mitral valve prolapse  Gastric reflux  H/O TIA (transient ischemic attack) and stroke  Hypothyroid  Hypothyroidism, acquired  Parkinson's disease  Accelerated hypertension  History of tonsillectomy  History of cataract surgery  Hx of lumpectomy  History of appendectomy  History of ovarian cystectomy        ICU Vital Signs Last 24 Hrs  T(C): 36.3 (23 Mar 2018 04:55), Max: 37.1 (22 Mar 2018 12:30)  T(F): 97.4 (23 Mar 2018 04:55), Max: 98.8 (22 Mar 2018 12:30)  HR: 66 (23 Mar 2018 04:55) (66 - 77)  BP: 146/66 (23 Mar 2018 04:55) (100/59 - 169/75)  BP(mean): 104 (22 Mar 2018 22:00) (59 - 104)  ABP: --  ABP(mean): --  RR: 20 (23 Mar 2018 04:55) (15 - 28)  SpO2: 97% (23 Mar 2018 04:55) (89% - 100%)    Qtts:     I&O's Summary    22 Mar 2018 07:01  -  23 Mar 2018 07:00  --------------------------------------------------------  IN: 740 mL / OUT: 0 mL / NET: 740 mL        REVIEW OF SYSTEMS:    CONSTITUTIONAL: No fever, weight loss, or fatigue  EYES: No eye pain, visual disturbances, or discharge  ENMT:  No difficulty hearing, tinnitus, vertigo; No sinus or throat pain  NECK: No pain or stiffness  BREASTS: No pain, masses, or nipple discharge  RESPIRATORY: No cough, wheezing, chills or hemoptysis; No shortness of breath  CARDIOVASCULAR: No chest pain, palpitations, dizziness, or leg swelling  GASTROINTESTINAL: No abdominal or epigastric pain. No nausea, vomiting, or hematemesis; No diarrhea or constipation. No melena or hematochezia.  GENITOURINARY: No dysuria, frequency, hematuria, or incontinence  NEUROLOGICAL: No headaches, , numbness, s  SKIN: No itching, burning, rashes, or lesions   LYMPH NODES: No enlarged glands  ENDOCRINE: No heat or cold intolerance; No hair loss  MUSCULOSKELETAL: No joint pain or swelling; No muscle, back, or extremity pain, no calf tenderness  PSYCHIATRIC: No depression, anxiety, mood swings, or difficulty sleeping  HEME/LYMPH: No easy bruising, or bleeding gums  ALLERGY AND IMMUNOLOGIC: No hives or eczema      PHYSICAL EXAM:    GENERAL: NAD, well-groomed, well-developed, NAD  HEAD:  Atraumatic, Normocephalic  EYES: EOMI, PERRLA, conjunctiva and sclera clear  ENMT: No tonsillar erythema, exudates, or enlargement; Moist mucous membranes, Good dentition, No lesions  NECK: Supple, No JVD, Normal thyroid  NERVOUS SYSTEM:  poorly verbal, somewhat alert,  not moving ext much  CHEST/LUNG: Clear to percussion bilaterally; No rales, rhonchi, wheezing, or rubs Decreased bs bases  HEART: Regular rate and rhythm; No murmurs, rubs, or gallops  ABDOMEN: Soft, Nontender, Nondistended; Bowel sounds present  EXTREMITIES:  + Peripheral Pulses, No clubbing, cyanosis, or edema  Chronic changes legs.  LYMPH: No lymphadenopathy noted  SKIN: No rashes or lesions          LABS:                        11.6   8.5   )-----------( 114      ( 23 Mar 2018 07:59 )             35.3     03-22    151<H>  |  110<H>  |  37<H>  ----------------------------<  124<H>  3.3<L>   |  36<H>  |  0.71    Ca    8.4      22 Mar 2018 06:37            vanco through     RADIOLOGY & ADDITIONAL STUDIES:  CXR left infil/eff    CRITICAL CARE TIME SPENT:

## 2018-09-14 NOTE — ED PROVIDER NOTE - FACE
no lymph node enlargement Hpi Title: Evaluation of Skin Lesions How Severe Are Your Spot(S)?: mild Have Your Spot(S) Been Treated In The Past?: has not been treated

## 2019-07-17 NOTE — ED ADULT NURSE NOTE - DISCHARGE DATE/TIME
----- Message from Zack Jacobson sent at 7/17/2019  2:55 PM CDT -----  Contact: KOFI MCCALL [[id number]]  Name of Who is Calling: KOFI MCCALL [[id number]]      What is the request in detail: Would like to speak with staff in regards to rescheduling her appointment. Next available for schedule is August 29th, only wants to see Dr. Resendiz, refused NP. Please advise      Can the clinic reply by [facility]: no      What Number to [facility] Back [facility]: [phone]                                  
Staff try to contact patient in regards to her message.    Staff was unsuccessful to leaving a message.   
21-Feb-2018 16:20

## 2019-12-04 NOTE — SWALLOW BEDSIDE ASSESSMENT ADULT - SWALLOW EVAL: BUCCAL STRENGTH AND MOBILITY
Middle Ear Infection (Adult)  You have an infection of the middle ear, the space behind the eardrum. This is also called acute otitis media (AOM). Sometimes it is caused by the common cold. This is because congestion can block the internal passage (eustachian tube) that drains fluid from the middle ear. When the middle ear fills with fluid, bacteria can grow there and cause an infection. Oral antibiotics are used to treat this illness, not ear drops. Symptoms usually start to improve within 1 to 2 days of treatment.    Home care  The following are general care guidelines:  · Finish all of the antibiotic medicine given, even though you may feel better after the first few days.  · You may use over-the-counter medicine, such as acetaminophen or ibuprofen, to control pain and fever, unless something else was prescribed. If you have chronic liver or kidney disease or have ever had a stomach ulcer or gastrointestinal bleeding, talk with your healthcare provider before using these medicines. Do not give aspirin to anyone under 18 years of age who has a fever. It may cause severe illness or death.  Follow-up care  Follow up with your healthcare provider, or as advised, in 2 weeks if all symptoms have not gotten better, or if hearing doesn't go back to normal within 1 month.  When to seek medical advice  Call your healthcare provider right away if any of these occur:  · Ear pain gets worse or does not improve after 3 days of treatment  · Unusual drowsiness or confusion  · Neck pain, stiff neck, or headache  · Fluid or blood draining from the ear canal  · Fever of 100.4°F (38°C) or as advised   · Seizure  Date Last Reviewed: 6/1/2016 © 2000-2018 [employer]. [address] [place] [address], [place] [address]. All rights reserved. This information is not intended as a substitute for professional medical care. Always follow your healthcare professional's instructions.      Patient Education     External Ear Infection  (Adult)    External otitis (also called “swimmer’s ear”) is an infection in the ear canal. It is often caused by bacteria or fungus. It can occur a few days after water gets trapped in the ear canal (from swimming or bathing). It can also occur after cleaning too deeply in the ear canal with a cotton swab or other object. Sometimes, hair care products get into the ear canal and cause this problem.  Symptoms can include pain, fever, itching, redness, drainage, or swelling of the ear canal. Temporary hearing loss may also occur.  Home care  · Do not try to clean the ear canal. This can push pus and bacteria deeper into the canal.  · Use prescribed ear drops as directed. These help reduce swelling and fight the infection. If an ear wick was placed in the ear canal, apply drops right onto the end of the wick. The wick will draw the medicine into the ear canal even if it is swollen closed.  · A cotton ball may be loosely placed in the outer ear to absorb any drainage.  · You may use acetaminophen or ibuprofen to control pain, unless another medicine was prescribed. Note: If you have chronic liver or kidney disease or ever had a stomach ulcer or GI bleeding, talk to your healthcare provider before taking any of these medicines.  · Do not allow water to get into your ear when bathing. Also, don't swim until the infection has cleared.  Prevention  · Keep your ears dry. This helps lower the risk of infection. Dry your ears with a towel or hair dryer after getting wet. Also, use ear plugs when swimming.  · Do not stick any objects in the ear to remove wax.  · If you feel water trapped in your ear, use ear drops right away. You can get these drops over the counter at most drugstores. They work by removing water from the ear canal.  Follow-up care  Follow up with your healthcare provider in 1 week, or as advised.  When to seek medical advice  Call your healthcare provider right away if any of these occur:  · Ear pain becomes  worse or doesn’t improve after 3 days of treatment  · Redness or swelling of the outer ear occurs or gets worse  · Headache  · Painful or stiff neck  · Drowsiness or confusion  · Fever of 100.4ºF (38ºC) or higher, or as directed by your healthcare provider  · Seizure  Date Last Reviewed: 10/1/2017  © 6225-0074 [employer]. [address] [place] [address]. All rights reserved. This information is not intended as a substitute for professional medical care. Always follow your healthcare professional's instructions.         Latrice Castillo MD   PCP - General, Family Practice + 1 more    Since 4/1/2015    [phone] + 1 more     Follow-up in 2 to 3 days.  Go to the ED if symptoms worsen.   intact

## 2021-06-21 NOTE — ED PROVIDER NOTE - NSCAREINITIATED _GEN_ER
Size Of Lesion In Cm (Optional): 0.5 Detail Level: Simple X Size Of Lesion In Cm (Optional): 0.2 Ronnie Durham(Attending)

## 2022-01-19 NOTE — ED ADULT NURSE NOTE - NS ED NOTE ABUSE RESPONSE YN
Day 4 of outpatient xrt to the lung. Doing well, denies any issues at this time, no pain, no sob, no cp. Will continue to monitor.    
Yes

## 2022-02-15 NOTE — ED PROVIDER NOTE - NS ED NOTE AC HIGH RISK COUNTRIES
If Muna has any fevers, redness to area, any draining, worsening swelling, pain not improved with tylenol or motrin or if you have any other concerns, return to the emergency room.    She can take tylenol 650 mg every 6 hours and motrin 400 mg every 6 to 8 hours as needed for pain.   No

## 2023-04-13 NOTE — DIETITIAN INITIAL EVALUATION ADULT. - 30 CAL TO
6470
Render In Strict Bullet Format?: Yes
Plan: Discussed treatment options. Pt opted to try skin medicinals 5FU/calcipotriene cream. Treat affected area BID x 5 days.
Detail Level: Zone

## 2024-07-31 NOTE — ED PROVIDER NOTE - PSH
No
History of appendectomy    History of cataract surgery    History of ovarian cystectomy    History of tonsillectomy    Hx of lumpectomy